# Patient Record
Sex: FEMALE | Race: WHITE | URBAN - METROPOLITAN AREA
[De-identification: names, ages, dates, MRNs, and addresses within clinical notes are randomized per-mention and may not be internally consistent; named-entity substitution may affect disease eponyms.]

---

## 2023-03-28 ENCOUNTER — OFFICE VISIT (OUTPATIENT)
Dept: GERIATRIC MEDICINE | Age: 88
End: 2023-03-28

## 2023-03-28 DIAGNOSIS — I48.91 ATRIAL FIBRILLATION, UNSPECIFIED TYPE (HCC): ICD-10-CM

## 2023-03-28 DIAGNOSIS — I50.43 ACUTE ON CHRONIC COMBINED SYSTOLIC AND DIASTOLIC CHF (CONGESTIVE HEART FAILURE) (HCC): ICD-10-CM

## 2023-03-28 DIAGNOSIS — R53.1 WEAKNESS: ICD-10-CM

## 2023-03-28 DIAGNOSIS — S52.122S CLOSED DISPLACED FRACTURE OF HEAD OF LEFT RADIUS, SEQUELA: Primary | ICD-10-CM

## 2023-03-29 ENCOUNTER — OFFICE VISIT (OUTPATIENT)
Dept: GERIATRIC MEDICINE | Age: 88
End: 2023-03-29

## 2023-03-29 DIAGNOSIS — R53.1 WEAKNESS: ICD-10-CM

## 2023-03-29 DIAGNOSIS — I50.43 ACUTE ON CHRONIC COMBINED SYSTOLIC AND DIASTOLIC CHF (CONGESTIVE HEART FAILURE) (HCC): ICD-10-CM

## 2023-03-29 DIAGNOSIS — S52.122S CLOSED DISPLACED FRACTURE OF HEAD OF LEFT RADIUS, SEQUELA: Primary | ICD-10-CM

## 2023-03-29 DIAGNOSIS — I48.91 ATRIAL FIBRILLATION, UNSPECIFIED TYPE (HCC): ICD-10-CM

## 2023-03-30 ENCOUNTER — OFFICE VISIT (OUTPATIENT)
Dept: GERIATRIC MEDICINE | Age: 88
End: 2023-03-30

## 2023-03-30 DIAGNOSIS — S52.122S CLOSED DISPLACED FRACTURE OF HEAD OF LEFT RADIUS, SEQUELA: Primary | ICD-10-CM

## 2023-03-30 DIAGNOSIS — I48.91 ATRIAL FIBRILLATION, UNSPECIFIED TYPE (HCC): ICD-10-CM

## 2023-03-30 DIAGNOSIS — I50.43 ACUTE ON CHRONIC COMBINED SYSTOLIC AND DIASTOLIC CHF (CONGESTIVE HEART FAILURE) (HCC): ICD-10-CM

## 2023-03-30 DIAGNOSIS — R53.1 WEAKNESS: ICD-10-CM

## 2023-03-31 ENCOUNTER — OFFICE VISIT (OUTPATIENT)
Dept: GERIATRIC MEDICINE | Age: 88
End: 2023-03-31

## 2023-03-31 DIAGNOSIS — R53.1 WEAKNESS: ICD-10-CM

## 2023-03-31 DIAGNOSIS — I48.91 ATRIAL FIBRILLATION, UNSPECIFIED TYPE (HCC): ICD-10-CM

## 2023-03-31 DIAGNOSIS — S52.122S CLOSED DISPLACED FRACTURE OF HEAD OF LEFT RADIUS, SEQUELA: Primary | ICD-10-CM

## 2023-03-31 DIAGNOSIS — I50.43 ACUTE ON CHRONIC COMBINED SYSTOLIC AND DIASTOLIC CHF (CONGESTIVE HEART FAILURE) (HCC): ICD-10-CM

## 2023-04-01 NOTE — PROGRESS NOTES
History and Physical      CHIEF COMPLAINT:  weakness/ Left shoulder pain. History of Present Illness:      A 80 y.o. female who is being seen at [facility]. The patient sustained a mechanical fall at home and fell on her left side breaking her radial head with a buckle fx. REVIEW OF SYSTEMS:  A complete 10 Point review of systems was preformed and negative unless previously stated      PMH:  Afib  Htn  Combined CHF     Surgical History:  No past surgical history on file. Medications Prior to Admission:    Prior to Admission medications    Not on File       Allergies:    Patient has no allergy information on record. Social History:    Denies smoking, drugs, ETOH     Family History:   Cardiac disease. PHYSICAL EXAM:      Vitals were reviewed and stable     Gen- appears stated age. HENT- bruising noted to the left eye  hearing intact, oral mucosa moist. Original dentition and fair. Eye- EOMI, No pale conjunctiva. No scleral icterus   Neck- No thyromegalgy. No JVD. Heart- RRR no murmur No LE edema  Lungs- CTA b/l no respiratory distress. 3 L NC oxygen   Abd- soft, Nontender, BS x 4   Musculoskel- muscle strength 5/5 UE bilaterally. 5/5 lower extremity bilaterally. Neuro- grossly intact. No acute deficits noted. No sensation disturbances. No facial droop   Skin- intact and dry. No rashes or ulcerations  Psych-  Mood and affect normal. Alert and oriented x 3         LABS:            ASSESSMENT/ PLAN[de-identified]      Left radial fracture   Saw ortho in hospital   Weakness   PT OT   Afib   on eliquis   HTN  Combined CHF   Monitor fluid balance. Jacinta Polanco DO, FACSUZY         NOTE: This report was transcribed using voice recognition software. Every effort was made to ensure accuracy; however, inadvertent computerized transcription errors may be present. Patient information on fall and injury prevention

## 2023-04-01 NOTE — PROGRESS NOTES
SNF PROGRESS NOTE      Cc- weakness, shoulder pain       Patient is a Elizabeth Franklin 80 y.o. female who is being seen at [facility]. She is resting in bed. Pain controlled. No past medical history on file. Patient has no allergy information on record.     VS reviewed    Gen- Alert and oriented   Heart- RRR no murmur no LE edema   Lungs- CTA b/l no resp distress RA oxygen   Abd- bs x 4           Assessment and Plan    Left radial fracture   Saw ortho in hospital   PRN oxycodone     Weakness   PT OT   Afib   on eliquis   Rate controlled   On cardizem/ metoprolol   HTN  On metoprolol   Depression   On zoloft   Combined CHF   Monitor fluid balance      Pato Alegria DO

## 2023-04-02 ENCOUNTER — OFFICE VISIT (OUTPATIENT)
Dept: GERIATRIC MEDICINE | Age: 88
End: 2023-04-02

## 2023-04-02 DIAGNOSIS — S52.122S CLOSED DISPLACED FRACTURE OF HEAD OF LEFT RADIUS, SEQUELA: Primary | ICD-10-CM

## 2023-04-02 DIAGNOSIS — I50.43 ACUTE ON CHRONIC COMBINED SYSTOLIC AND DIASTOLIC CHF (CONGESTIVE HEART FAILURE) (HCC): ICD-10-CM

## 2023-04-02 DIAGNOSIS — I48.91 ATRIAL FIBRILLATION, UNSPECIFIED TYPE (HCC): ICD-10-CM

## 2023-04-02 DIAGNOSIS — R53.1 WEAKNESS: ICD-10-CM

## 2023-04-02 NOTE — PROGRESS NOTES
SNF PROGRESS NOTE      Cc- weakness      Patient is a Sandra Manner 80 y.o. female who is being seen at [facility]. She seems to be doing well. Eating well. No problems with BM. No past medical history on file. Patient has no allergy information on record.     VS reviewed    Gen- Alert and oriented   Heart- RRR no murmur no LE edema   Lungs- CTA b/l no resp distress RA oxygen   Abd- bs x 4                   Assessment and Plan    Left radial fracture   Saw ortho in hospital   PRN oxycodone    Weakness   PT OT   Afib   on eliquis   Rate controlled   On cardizem/ metoprolol   HTN  On metoprolol   Depression   On zoloft   Combined CHF   Monitor fluid balance      Shanita Ace DO

## 2023-04-02 NOTE — PROGRESS NOTES
SNF PROGRESS NOTE      Cc- weakness      Patient is a Magdy Avila 80 y.o. female who is being seen at [facility]. She seems to be doing ok, No issues at this time and pain is controlled. No past medical history on file. Patient has no allergy information on record.     VS reviewed    Gen- Alert and oriented   Heart- RRR no murmur no LE edema   Lungs- CTA b/l no resp distress RA oxygen   Abd- bs x 4                      Assessment and Plan    Left radial fracture   Saw ortho in hospital   PRN oxycodone    Weakness   PT OT   Afib   on eliquis   Rate controlled   On cardizem/ metoprolol   HTN  On metoprolol   Depression   On zoloft   Combined CHF   Monitor fluid balance      Mari Irving DO

## 2023-04-04 ENCOUNTER — OFFICE VISIT (OUTPATIENT)
Dept: GERIATRIC MEDICINE | Age: 88
End: 2023-04-04
Payer: MEDICARE

## 2023-04-04 DIAGNOSIS — S52.122S CLOSED DISPLACED FRACTURE OF HEAD OF LEFT RADIUS, SEQUELA: Primary | ICD-10-CM

## 2023-04-04 DIAGNOSIS — R10.84 GENERALIZED ABDOMINAL PAIN: ICD-10-CM

## 2023-04-04 DIAGNOSIS — R53.1 WEAKNESS: ICD-10-CM

## 2023-04-04 DIAGNOSIS — I50.43 ACUTE ON CHRONIC COMBINED SYSTOLIC AND DIASTOLIC CHF (CONGESTIVE HEART FAILURE) (HCC): ICD-10-CM

## 2023-04-04 DIAGNOSIS — I48.91 ATRIAL FIBRILLATION, UNSPECIFIED TYPE (HCC): ICD-10-CM

## 2023-04-04 PROCEDURE — 1123F ACP DISCUSS/DSCN MKR DOCD: CPT | Performed by: INTERNAL MEDICINE

## 2023-04-04 PROCEDURE — 99309 SBSQ NF CARE MODERATE MDM 30: CPT | Performed by: INTERNAL MEDICINE

## 2023-04-05 NOTE — PROGRESS NOTES
SNF PROGRESS NOTE      Cc- weakness      Patient is a Ileene Bathe 80 y.o. female who is being seen at [facility]. She is walking around the room with her walker. Her daughter is at bedside. She is complaining of some abd pain. We went over her KUB results. She states that it's like her diverticulitis pain. No fever or chills. No bloody stools. Generalized abd pain. No past medical history on file. Patient has no allergy information on record. VS reviewed    Gen- Alert and oriented   Heart- RRR no murmur no LE edema   Lungs- CTA b/l no resp distress RA oxygen   Abd- bs x 4         Assessment and Plan    Left radial fracture   Saw ortho in hospital   PRN oxycodone    Weakness   PT OT   Afib   on eliquis   Rate controlled   On cardizem/ metoprolol   HTN  On metoprolol   Depression   On zoloft   Combined CHF   Watch fluid balance   Abdominal Pain   KUB reviewed and negative   Started on cipro and flagyl for ? Diverticulitis.        Madeleine Gallardo

## 2023-04-06 ENCOUNTER — OFFICE VISIT (OUTPATIENT)
Dept: GERIATRIC MEDICINE | Age: 88
End: 2023-04-06

## 2023-04-06 DIAGNOSIS — I50.43 ACUTE ON CHRONIC COMBINED SYSTOLIC AND DIASTOLIC CHF (CONGESTIVE HEART FAILURE) (HCC): ICD-10-CM

## 2023-04-06 DIAGNOSIS — S52.122S CLOSED DISPLACED FRACTURE OF HEAD OF LEFT RADIUS, SEQUELA: Primary | ICD-10-CM

## 2023-04-06 DIAGNOSIS — R53.1 WEAKNESS: ICD-10-CM

## 2023-04-06 DIAGNOSIS — I48.91 ATRIAL FIBRILLATION, UNSPECIFIED TYPE (HCC): ICD-10-CM

## 2023-04-06 DIAGNOSIS — R10.84 GENERALIZED ABDOMINAL PAIN: ICD-10-CM

## 2023-04-08 ENCOUNTER — OFFICE VISIT (OUTPATIENT)
Dept: GERIATRIC MEDICINE | Age: 88
End: 2023-04-08

## 2023-04-08 DIAGNOSIS — R53.1 WEAKNESS: ICD-10-CM

## 2023-04-08 DIAGNOSIS — S52.122S CLOSED DISPLACED FRACTURE OF HEAD OF LEFT RADIUS, SEQUELA: Primary | ICD-10-CM

## 2023-04-08 DIAGNOSIS — R10.84 GENERALIZED ABDOMINAL PAIN: ICD-10-CM

## 2023-04-08 DIAGNOSIS — I48.91 ATRIAL FIBRILLATION, UNSPECIFIED TYPE (HCC): ICD-10-CM

## 2023-04-08 DIAGNOSIS — I50.43 ACUTE ON CHRONIC COMBINED SYSTOLIC AND DIASTOLIC CHF (CONGESTIVE HEART FAILURE) (HCC): ICD-10-CM

## 2023-04-09 NOTE — PROGRESS NOTES
SNF PROGRESS NOTE      Cc- weakness      Patient is a Oziel Jones 80 y.o. female who is being seen at [facility]. The patient is laying in bed. She feels good and has been doing well with therapy. No past medical history on file. Patient has no allergy information on record. VS reviewed      Gen- Alert and oriented   Heart- RRR no murmur no LE edema   Lungs- CTA b/l no resp distress RA oxygen   Abd- bs x 4             There is no problem list on file for this patient. Assessment and Plan    Left radial fracture   Saw ortho in hospital   PRN oxycodone    Weakness   PT OT   Afib   on eliquis   Rate controlled   On cardizem/ metoprolol   HTN  On metoprolol   Depression   On zoloft   Combined CHF   Watch fluid balance   Abdominal Pain   KUB reviewed and negative   Started on cipro and flagyl for ? Diverticulitis.        Helen Ordoñez

## 2023-04-09 NOTE — PROGRESS NOTES
SNF PROGRESS NOTE      Cc- weakness      Patient is a Aletha Leyden 80 y.o. female who is being seen at [facility]. She is sitting in the chair at bedside. No complaints and pain is controlled. No past medical history on file. Patient has no allergy information on record. VS reviewed      Gen- Alert and oriented   Heart- RRR no murmur no LE edema   Lungs- CTA b/l no resp distress RA oxygen   Abd- bs x 4         Assessment and Plan    Left radial fracture   Saw ortho in hospital   PRN oxycodone    Weakness   PT OT   Afib   on eliquis   Rate controlled   On cardizem/ metoprolol   HTN  On metoprolol   Depression   On zoloft   Combined CHF   Watch fluid balance   Abdominal Pain   KUB reviewed and negative   Started on cipro and flagyl for ? Diverticulitis.        Veronika Egan

## 2023-04-15 ENCOUNTER — OFFICE VISIT (OUTPATIENT)
Dept: GERIATRIC MEDICINE | Age: 88
End: 2023-04-15
Payer: MEDICARE

## 2023-04-15 DIAGNOSIS — I48.91 ATRIAL FIBRILLATION, UNSPECIFIED TYPE (HCC): ICD-10-CM

## 2023-04-15 DIAGNOSIS — I50.43 ACUTE ON CHRONIC COMBINED SYSTOLIC AND DIASTOLIC CHF (CONGESTIVE HEART FAILURE) (HCC): ICD-10-CM

## 2023-04-15 DIAGNOSIS — R10.84 GENERALIZED ABDOMINAL PAIN: ICD-10-CM

## 2023-04-15 DIAGNOSIS — S52.122S CLOSED DISPLACED FRACTURE OF HEAD OF LEFT RADIUS, SEQUELA: Primary | ICD-10-CM

## 2023-04-15 DIAGNOSIS — R53.1 WEAKNESS: ICD-10-CM

## 2023-04-15 PROCEDURE — 99308 SBSQ NF CARE LOW MDM 20: CPT | Performed by: INTERNAL MEDICINE

## 2023-04-15 PROCEDURE — 1123F ACP DISCUSS/DSCN MKR DOCD: CPT | Performed by: INTERNAL MEDICINE

## 2023-04-16 ENCOUNTER — OFFICE VISIT (OUTPATIENT)
Dept: GERIATRIC MEDICINE | Age: 88
End: 2023-04-16

## 2023-04-16 DIAGNOSIS — I50.43 ACUTE ON CHRONIC COMBINED SYSTOLIC AND DIASTOLIC CHF (CONGESTIVE HEART FAILURE) (HCC): ICD-10-CM

## 2023-04-16 DIAGNOSIS — R53.1 WEAKNESS: ICD-10-CM

## 2023-04-16 DIAGNOSIS — R10.84 GENERALIZED ABDOMINAL PAIN: ICD-10-CM

## 2023-04-16 DIAGNOSIS — I48.91 ATRIAL FIBRILLATION, UNSPECIFIED TYPE (HCC): ICD-10-CM

## 2023-04-16 DIAGNOSIS — S52.122S CLOSED DISPLACED FRACTURE OF HEAD OF LEFT RADIUS, SEQUELA: Primary | ICD-10-CM

## 2023-04-17 ENCOUNTER — OFFICE VISIT (OUTPATIENT)
Dept: GERIATRIC MEDICINE | Age: 88
End: 2023-04-17

## 2023-04-17 DIAGNOSIS — R53.1 WEAKNESS: ICD-10-CM

## 2023-04-17 DIAGNOSIS — R10.84 GENERALIZED ABDOMINAL PAIN: ICD-10-CM

## 2023-04-17 DIAGNOSIS — I50.43 ACUTE ON CHRONIC COMBINED SYSTOLIC AND DIASTOLIC CHF (CONGESTIVE HEART FAILURE) (HCC): ICD-10-CM

## 2023-04-17 DIAGNOSIS — S52.122S CLOSED DISPLACED FRACTURE OF HEAD OF LEFT RADIUS, SEQUELA: Primary | ICD-10-CM

## 2023-04-17 DIAGNOSIS — I48.91 ATRIAL FIBRILLATION, UNSPECIFIED TYPE (HCC): ICD-10-CM

## 2023-04-19 NOTE — PROGRESS NOTES
SNF PROGRESS NOTE      Cc- weakness      Patient is a Valeta Blood 80 y.o. female who is being seen at [facility]. Apparently she occasionally not wanting to partake in therapy. She states that she has a problem because people are living in her house. No past medical history on file. Patient has no allergy information on record.     VS reviewed      Gen- Alert and oriented   Heart- RRR no murmur no LE edema   Lungs- CTA b/l no resp distress RA oxygen   Abd- bs x 4           Assessment and Plan  Left radial fracture   Saw ortho in hospital   PRN oxycodone    Weakness   PT OT   Afib   on eliquis   Rate controlled   On cardizem/ metoprolol   HTN  On metoprolol   Depression   On zoloft   Combined CHF   Watch fluid balance   Abdominal Pain   resolved        Patrice Prieto DO

## 2023-04-20 ENCOUNTER — OFFICE VISIT (OUTPATIENT)
Dept: GERIATRIC MEDICINE | Age: 88
End: 2023-04-20

## 2023-04-20 DIAGNOSIS — R10.84 GENERALIZED ABDOMINAL PAIN: ICD-10-CM

## 2023-04-20 DIAGNOSIS — R53.1 WEAKNESS: ICD-10-CM

## 2023-04-20 DIAGNOSIS — S52.122S CLOSED DISPLACED FRACTURE OF HEAD OF LEFT RADIUS, SEQUELA: Primary | ICD-10-CM

## 2023-04-20 DIAGNOSIS — I50.43 ACUTE ON CHRONIC COMBINED SYSTOLIC AND DIASTOLIC CHF (CONGESTIVE HEART FAILURE) (HCC): ICD-10-CM

## 2023-04-20 DIAGNOSIS — I48.91 ATRIAL FIBRILLATION, UNSPECIFIED TYPE (HCC): ICD-10-CM

## 2023-04-20 NOTE — PROGRESS NOTES
SNF PROGRESS NOTE      Cc- weakness      Patient is a Isaiah Radar 80 y.o. female who is being seen at [facility]. She is laying in the bed and eating lunch. No past medical history on file. Patient has no allergy information on record.     VS reviewed    Gen- Alert and oriented   Heart- RRR no murmur no LE edema   Lungs- CTA b/l no resp distress RA oxygen   Abd- bs x 4         Assessment and Plan  Left radial fracture   Saw ortho in hospital   PRN oxycodone    Weakness   PT OT   Afib   on eliquis   Rate controlled   On cardizem/ metoprolol   HTN  On metoprolol   Depression   On zoloft   Combined CHF   Watch fluid balance   Abdominal Pain   resolved        Enio Camarena DO

## 2023-04-21 NOTE — PROGRESS NOTES
SNF PROGRESS NOTE      Cc- weakness      Patient is a Nazario Butt 80 y.o. female who Is being seen at [facility]. She is sitting is laying in the bed. She just got done with therapy. She ate a good lunch. No past medical history on file. Patient has no allergy information on record. VS reviewed    Gen- Alert and oriented x2   Heart- RRR no murmur no LE edema   Lungs- CTA b/l no resp distress RA oxygen   Abd- bs x 4        There is no problem list on file for this patient.           Assessment and Plan  Left radial fracture   Saw ortho in hospital-- f/u OP  PRN oxycodone    Weakness   PT OT   Afib   on eliquis   Rate controlled   On cardizem/ metoprolol   HTN  On metoprolol   Depression   On zoloft   Combined CHF   Watch fluid balance   Abdominal Pain   resolved        Thedora Newcomer DO, Sheri Galeazzi

## 2023-04-21 NOTE — PROGRESS NOTES
SNF PROGRESS NOTE      Cc- weakness      Patient is a Ryan Restrepo 80 y.o. female who is being seen at [facility]. She is sitting in the chair. She denies any complaints. No past medical history on file. Patient has no allergy information on record. VS reviewed      Gen- Alert and oriented x2   Heart- RRR no murmur no LE edema   Lungs- CTA b/l no resp distress RA oxygen   Abd- bs x 4               There is no problem list on file for this patient.           Assessment and Plan    Left radial fracture   Saw ortho in hospital   PRN oxycodone    Weakness   PT OT   Afib   on eliquis   Rate controlled   On cardizem/ metoprolol   HTN  On metoprolol   Depression   On zoloft   Combined CHF   Watch fluid balance   Abdominal Pain   resolved      Brady Adjutant Paulino RENO

## 2023-04-24 ENCOUNTER — OFFICE VISIT (OUTPATIENT)
Dept: GERIATRIC MEDICINE | Age: 88
End: 2023-04-24

## 2023-04-24 DIAGNOSIS — S52.122S CLOSED DISPLACED FRACTURE OF HEAD OF LEFT RADIUS, SEQUELA: Primary | ICD-10-CM

## 2023-04-24 DIAGNOSIS — I50.43 ACUTE ON CHRONIC COMBINED SYSTOLIC AND DIASTOLIC CHF (CONGESTIVE HEART FAILURE) (HCC): ICD-10-CM

## 2023-04-24 DIAGNOSIS — R53.1 WEAKNESS: ICD-10-CM

## 2023-04-24 DIAGNOSIS — R10.84 GENERALIZED ABDOMINAL PAIN: ICD-10-CM

## 2023-04-24 DIAGNOSIS — I48.91 ATRIAL FIBRILLATION, UNSPECIFIED TYPE (HCC): ICD-10-CM

## 2023-04-25 ENCOUNTER — OFFICE VISIT (OUTPATIENT)
Dept: GERIATRIC MEDICINE | Age: 88
End: 2023-04-25

## 2023-04-25 DIAGNOSIS — R10.84 GENERALIZED ABDOMINAL PAIN: ICD-10-CM

## 2023-04-25 DIAGNOSIS — R53.1 WEAKNESS: ICD-10-CM

## 2023-04-25 DIAGNOSIS — S52.122S CLOSED DISPLACED FRACTURE OF HEAD OF LEFT RADIUS, SEQUELA: Primary | ICD-10-CM

## 2023-04-25 DIAGNOSIS — I48.91 ATRIAL FIBRILLATION, UNSPECIFIED TYPE (HCC): ICD-10-CM

## 2023-04-25 DIAGNOSIS — I50.43 ACUTE ON CHRONIC COMBINED SYSTOLIC AND DIASTOLIC CHF (CONGESTIVE HEART FAILURE) (HCC): ICD-10-CM

## 2023-04-26 ENCOUNTER — OFFICE VISIT (OUTPATIENT)
Dept: GERIATRIC MEDICINE | Age: 88
End: 2023-04-26

## 2023-04-26 DIAGNOSIS — I48.91 ATRIAL FIBRILLATION, UNSPECIFIED TYPE (HCC): ICD-10-CM

## 2023-04-26 DIAGNOSIS — S52.122S CLOSED DISPLACED FRACTURE OF HEAD OF LEFT RADIUS, SEQUELA: Primary | ICD-10-CM

## 2023-04-26 DIAGNOSIS — I50.43 ACUTE ON CHRONIC COMBINED SYSTOLIC AND DIASTOLIC CHF (CONGESTIVE HEART FAILURE) (HCC): ICD-10-CM

## 2023-04-26 DIAGNOSIS — R53.1 WEAKNESS: ICD-10-CM

## 2023-04-26 DIAGNOSIS — R10.84 GENERALIZED ABDOMINAL PAIN: ICD-10-CM

## 2023-04-27 ENCOUNTER — OFFICE VISIT (OUTPATIENT)
Dept: GERIATRIC MEDICINE | Age: 88
End: 2023-04-27
Payer: MEDICARE

## 2023-04-27 DIAGNOSIS — S52.122S CLOSED DISPLACED FRACTURE OF HEAD OF LEFT RADIUS, SEQUELA: Primary | ICD-10-CM

## 2023-04-27 DIAGNOSIS — R53.1 WEAKNESS: ICD-10-CM

## 2023-04-27 DIAGNOSIS — R10.84 GENERALIZED ABDOMINAL PAIN: ICD-10-CM

## 2023-04-27 DIAGNOSIS — I48.91 ATRIAL FIBRILLATION, UNSPECIFIED TYPE (HCC): ICD-10-CM

## 2023-04-27 DIAGNOSIS — I50.43 ACUTE ON CHRONIC COMBINED SYSTOLIC AND DIASTOLIC CHF (CONGESTIVE HEART FAILURE) (HCC): ICD-10-CM

## 2023-04-27 PROCEDURE — 99308 SBSQ NF CARE LOW MDM 20: CPT | Performed by: INTERNAL MEDICINE

## 2023-04-27 PROCEDURE — 1123F ACP DISCUSS/DSCN MKR DOCD: CPT | Performed by: INTERNAL MEDICINE

## 2023-04-28 ENCOUNTER — OFFICE VISIT (OUTPATIENT)
Dept: GERIATRIC MEDICINE | Age: 88
End: 2023-04-28
Payer: MEDICARE

## 2023-04-28 DIAGNOSIS — I48.91 ATRIAL FIBRILLATION, UNSPECIFIED TYPE (HCC): ICD-10-CM

## 2023-04-28 DIAGNOSIS — S52.122S CLOSED DISPLACED FRACTURE OF HEAD OF LEFT RADIUS, SEQUELA: Primary | ICD-10-CM

## 2023-04-28 DIAGNOSIS — R10.84 GENERALIZED ABDOMINAL PAIN: ICD-10-CM

## 2023-04-28 DIAGNOSIS — R53.1 WEAKNESS: ICD-10-CM

## 2023-04-28 DIAGNOSIS — I50.43 ACUTE ON CHRONIC COMBINED SYSTOLIC AND DIASTOLIC CHF (CONGESTIVE HEART FAILURE) (HCC): ICD-10-CM

## 2023-04-28 PROCEDURE — 1123F ACP DISCUSS/DSCN MKR DOCD: CPT | Performed by: INTERNAL MEDICINE

## 2023-04-28 PROCEDURE — 99308 SBSQ NF CARE LOW MDM 20: CPT | Performed by: INTERNAL MEDICINE

## 2023-04-29 NOTE — PROGRESS NOTES
SNF PROGRESS NOTE      Cc- weakness      Patient is a Shreya Martinez 80 y.o. female who is being seen at [facility]. She is laying in her bed. Her bruise around the eye continues to improve. She is continuing to work with therapy. No past medical history on file. Patient has no allergy information on record. VS reviewed      Gen- Alert and oriented x2   Heart- RRR no murmur no LE edema   Lungs- CTA b/l no resp distress RA oxygen   Abd- bs x 4            There is no problem list on file for this patient.           Assessment and Plan    Left radial fracture   Saw ortho in hospital-- f/u OP  PRN oxycodone    Weakness   PT OT   Afib   on eliquis   Rate controlled   On cardizem/ metoprolol   HTN  On metoprolol   Depression   On zoloft   Combined CHF   Watch fluid balance   Abdominal Pain   resolved      Goran Olsen DO, Pankaj Slaughter

## 2023-04-30 ENCOUNTER — OFFICE VISIT (OUTPATIENT)
Dept: GERIATRIC MEDICINE | Age: 88
End: 2023-04-30
Payer: MEDICARE

## 2023-04-30 DIAGNOSIS — R10.84 GENERALIZED ABDOMINAL PAIN: ICD-10-CM

## 2023-04-30 DIAGNOSIS — I50.43 ACUTE ON CHRONIC COMBINED SYSTOLIC AND DIASTOLIC CHF (CONGESTIVE HEART FAILURE) (HCC): ICD-10-CM

## 2023-04-30 DIAGNOSIS — I48.91 ATRIAL FIBRILLATION, UNSPECIFIED TYPE (HCC): ICD-10-CM

## 2023-04-30 DIAGNOSIS — R53.1 WEAKNESS: ICD-10-CM

## 2023-04-30 DIAGNOSIS — S52.122S CLOSED DISPLACED FRACTURE OF HEAD OF LEFT RADIUS, SEQUELA: Primary | ICD-10-CM

## 2023-04-30 PROCEDURE — 99308 SBSQ NF CARE LOW MDM 20: CPT | Performed by: INTERNAL MEDICINE

## 2023-04-30 PROCEDURE — 1123F ACP DISCUSS/DSCN MKR DOCD: CPT | Performed by: INTERNAL MEDICINE

## 2023-05-01 ENCOUNTER — OFFICE VISIT (OUTPATIENT)
Dept: GERIATRIC MEDICINE | Age: 88
End: 2023-05-01
Payer: MEDICARE

## 2023-05-01 DIAGNOSIS — R10.84 GENERALIZED ABDOMINAL PAIN: ICD-10-CM

## 2023-05-01 DIAGNOSIS — I48.91 ATRIAL FIBRILLATION, UNSPECIFIED TYPE (HCC): ICD-10-CM

## 2023-05-01 DIAGNOSIS — I50.43 ACUTE ON CHRONIC COMBINED SYSTOLIC AND DIASTOLIC CHF (CONGESTIVE HEART FAILURE) (HCC): ICD-10-CM

## 2023-05-01 DIAGNOSIS — S52.122S CLOSED DISPLACED FRACTURE OF HEAD OF LEFT RADIUS, SEQUELA: Primary | ICD-10-CM

## 2023-05-01 DIAGNOSIS — R53.1 WEAKNESS: ICD-10-CM

## 2023-05-01 PROCEDURE — 99308 SBSQ NF CARE LOW MDM 20: CPT | Performed by: INTERNAL MEDICINE

## 2023-05-01 PROCEDURE — 1123F ACP DISCUSS/DSCN MKR DOCD: CPT | Performed by: INTERNAL MEDICINE

## 2023-05-02 NOTE — PROGRESS NOTES
SNF PROGRESS NOTE      Cc- weakness      Patient is a Raoul Augustin 80 y.o. female who is being seen at [facility]. She is sitting in the chair and doing ok. No past medical history on file. Patient has no allergy information on record. VS reviewed    Gen- Alert and oriented x2   Heart- RRR no murmur no LE edema   Lungs- CTA b/l no resp distress RA oxygen   Abd- bs x 4            There is no problem list on file for this patient.           Assessment and Plan    Left radial fracture   Saw ortho in hospital-- f/u OP  PRN oxycodone    Weakness   PT OT   Afib   on eliquis   Rate controlled   On cardizem/ metoprolol   HTN  On metoprolol   Depression   On zoloft   Combined CHF   Watch fluid balance   Abdominal Pain   resolved      Kika Esqueda DO, Fredy Mg

## 2023-05-02 NOTE — PROGRESS NOTES
SNF PROGRESS NOTE      Cc- weakness      Patient is a Vera Repress 80 y.o. female who is being see at [facility]. She is sitting in the chair watching TV. She denies any complaints at this time. She states that therapy is \"going\". No past medical history on file. Patient has no allergy information on record. VS reviewed    Gen- Alert and oriented x2   Heart- RRR no murmur no LE edema   Lungs- CTA b/l no resp distress RA oxygen   Abd- bs x 4            There is no problem list on file for this patient.           Assessment and Plan    Left radial fracture   Saw ortho in hospital-- f/u OP  PRN oxycodone    Weakness   PT OT   Afib   on eliquis   Rate controlled   On cardizem/ metoprolol   HTN  On metoprolol   Depression   On zoloft   Combined CHF   Watch fluid balance   Abdominal Pain   resolved      Dede Putt DO, Marlys Closs

## 2023-05-02 NOTE — PROGRESS NOTES
SNF PROGRESS NOTE      Cc- weakness      Patient is a Blanca Levels 80 y.o. female who is being seen at [facility]. She is laying in her bed. She states pain controlled and eating ok. No past medical history on file. Patient has no allergy information on record. VS reviewed      Gen- Alert and oriented x2   Heart- RRR no murmur no LE edema   Lungs- CTA b/l no resp distress RA oxygen   Abd- bs x 4          There is no problem list on file for this patient.           Assessment and Plan    Left radial fracture   Saw ortho in hospital-- f/u OP  PRN oxycodone    Weakness   PT OT   Afib   on eliquis   Rate controlled   On cardizem/ metoprolol   HTN  On metoprolol   Depression   On zoloft   Combined CHF   Watch fluid balance   Abdominal Pain   resolved      Dolores Haji DO, Gaetano Aguirre

## 2023-05-03 ENCOUNTER — OFFICE VISIT (OUTPATIENT)
Dept: GERIATRIC MEDICINE | Age: 88
End: 2023-05-03

## 2023-05-03 DIAGNOSIS — R53.1 WEAKNESS: ICD-10-CM

## 2023-05-03 DIAGNOSIS — I50.43 ACUTE ON CHRONIC COMBINED SYSTOLIC AND DIASTOLIC CHF (CONGESTIVE HEART FAILURE) (HCC): ICD-10-CM

## 2023-05-03 DIAGNOSIS — I48.91 ATRIAL FIBRILLATION, UNSPECIFIED TYPE (HCC): ICD-10-CM

## 2023-05-03 DIAGNOSIS — R10.84 GENERALIZED ABDOMINAL PAIN: ICD-10-CM

## 2023-05-03 DIAGNOSIS — S52.122S CLOSED DISPLACED FRACTURE OF HEAD OF LEFT RADIUS, SEQUELA: Primary | ICD-10-CM

## 2023-05-05 ENCOUNTER — OFFICE VISIT (OUTPATIENT)
Dept: GERIATRIC MEDICINE | Age: 88
End: 2023-05-05

## 2023-05-05 DIAGNOSIS — I48.91 ATRIAL FIBRILLATION, UNSPECIFIED TYPE (HCC): ICD-10-CM

## 2023-05-05 DIAGNOSIS — R10.84 GENERALIZED ABDOMINAL PAIN: ICD-10-CM

## 2023-05-05 DIAGNOSIS — I50.43 ACUTE ON CHRONIC COMBINED SYSTOLIC AND DIASTOLIC CHF (CONGESTIVE HEART FAILURE) (HCC): ICD-10-CM

## 2023-05-05 DIAGNOSIS — R53.1 WEAKNESS: ICD-10-CM

## 2023-05-05 DIAGNOSIS — S52.122S CLOSED DISPLACED FRACTURE OF HEAD OF LEFT RADIUS, SEQUELA: Primary | ICD-10-CM

## 2023-05-05 NOTE — PROGRESS NOTES
SNF PROGRESS NOTE      Cc- weakness      Patient is a Jeffery Jackson 80 y.o. female who is being seen at [facility]. She is laying in bed. No complaints. No SOB. No CP. Eating well. No past medical history on file. Patient has no allergy information on record.     VS reviewed    Gen- Alert and oriented x2   Heart- RRR no murmur no LE edema   Lungs- CTA b/l no resp distress RA oxygen   Abd- bs x 4        Assessment and Plan  Left radial fracture   Saw ortho in hospital-- f/u OP  PRN oxycodone    Weakness   PT OT   Afib   on eliquis   Rate controlled   On cardizem/ metoprolol   HTN  On metoprolol   Depression   On zoloft   Combined CHF   Watch fluid balance   Abdominal Pain   resolved        Laxmi Chou DO

## 2023-05-06 NOTE — PROGRESS NOTES
SNF PROGRESS NOTE      Cc- weakness      Patient is a Domitila Amaro 80 y.o. female who is being seen at [facility]. She is laying in bed. States that she is doing ok. No complaints at this time. Eating well. No past medical history on file. Patient has no allergy information on record.     VS reviewed    Gen- Alert and oriented x2   Heart- RRR no murmur no LE edema   Lungs- CTA b/l no resp distress RA oxygen   Abd- bs x 4            Assessment and Plan    Left radial fracture   Saw ortho in hospital-- f/u OP  PRN oxycodone    Weakness   PT OT   Afib   on eliquis   Rate controlled   On cardizem/ metoprolol   HTN  On metoprolol   Depression   On zoloft   Combined CHF   Watch fluid balance   Abdominal Pain   resolved      Melo Zhong DO, Marilyn Arts

## 2023-05-08 ENCOUNTER — OFFICE VISIT (OUTPATIENT)
Dept: GERIATRIC MEDICINE | Age: 88
End: 2023-05-08
Payer: MEDICARE

## 2023-05-08 DIAGNOSIS — I48.91 ATRIAL FIBRILLATION, UNSPECIFIED TYPE (HCC): ICD-10-CM

## 2023-05-08 DIAGNOSIS — I50.43 ACUTE ON CHRONIC COMBINED SYSTOLIC AND DIASTOLIC CHF (CONGESTIVE HEART FAILURE) (HCC): ICD-10-CM

## 2023-05-08 DIAGNOSIS — R53.1 WEAKNESS: ICD-10-CM

## 2023-05-08 DIAGNOSIS — S52.122S CLOSED DISPLACED FRACTURE OF HEAD OF LEFT RADIUS, SEQUELA: Primary | ICD-10-CM

## 2023-05-08 PROCEDURE — 99308 SBSQ NF CARE LOW MDM 20: CPT | Performed by: INTERNAL MEDICINE

## 2023-05-08 PROCEDURE — 1123F ACP DISCUSS/DSCN MKR DOCD: CPT | Performed by: INTERNAL MEDICINE

## 2023-05-08 NOTE — PROGRESS NOTES
SNF PROGRESS NOTE      Cc- weakness      Patient is a Adri Rubio 80 y.o. female who is being seen at [facility]. She is laying in bed and continues to work with therapy. No past medical history on file. Patient has no allergy information on record. VS reviewed     Gen- Alert and oriented x2   Heart- RRR no murmur no LE edema   Lungs- CTA b/l no resp distress RA oxygen   Abd- bs x 4              There is no problem list on file for this patient.           Assessment and Plan    Left radial fracture   Saw ortho in hospital-- f/u OP  PRN oxycodone    Weakness   PT OT   Afib   on eliquis   Rate controlled   On cardizem/ metoprolol   HTN  On metoprolol   Depression   On zoloft   Combined CHF   Watch fluid balance   Abdominal Pain   resolved      Leti Adair DO

## 2023-05-09 ENCOUNTER — OFFICE VISIT (OUTPATIENT)
Dept: GERIATRIC MEDICINE | Age: 88
End: 2023-05-09

## 2023-05-09 DIAGNOSIS — I48.91 ATRIAL FIBRILLATION, UNSPECIFIED TYPE (HCC): ICD-10-CM

## 2023-05-09 DIAGNOSIS — S52.122S CLOSED DISPLACED FRACTURE OF HEAD OF LEFT RADIUS, SEQUELA: Primary | ICD-10-CM

## 2023-05-09 DIAGNOSIS — R53.1 WEAKNESS: ICD-10-CM

## 2023-05-09 DIAGNOSIS — R10.84 GENERALIZED ABDOMINAL PAIN: ICD-10-CM

## 2023-05-09 DIAGNOSIS — I50.43 ACUTE ON CHRONIC COMBINED SYSTOLIC AND DIASTOLIC CHF (CONGESTIVE HEART FAILURE) (HCC): ICD-10-CM

## 2023-05-10 ENCOUNTER — OFFICE VISIT (OUTPATIENT)
Dept: GERIATRIC MEDICINE | Age: 88
End: 2023-05-10
Payer: MEDICARE

## 2023-05-10 DIAGNOSIS — R10.84 GENERALIZED ABDOMINAL PAIN: ICD-10-CM

## 2023-05-10 DIAGNOSIS — I48.91 ATRIAL FIBRILLATION, UNSPECIFIED TYPE (HCC): ICD-10-CM

## 2023-05-10 DIAGNOSIS — I50.43 ACUTE ON CHRONIC COMBINED SYSTOLIC AND DIASTOLIC CHF (CONGESTIVE HEART FAILURE) (HCC): ICD-10-CM

## 2023-05-10 DIAGNOSIS — R53.1 WEAKNESS: ICD-10-CM

## 2023-05-10 DIAGNOSIS — S52.122S CLOSED DISPLACED FRACTURE OF HEAD OF LEFT RADIUS, SEQUELA: Primary | ICD-10-CM

## 2023-05-10 PROCEDURE — 99308 SBSQ NF CARE LOW MDM 20: CPT | Performed by: INTERNAL MEDICINE

## 2023-05-10 PROCEDURE — 1123F ACP DISCUSS/DSCN MKR DOCD: CPT | Performed by: INTERNAL MEDICINE

## 2023-05-11 ENCOUNTER — OFFICE VISIT (OUTPATIENT)
Dept: GERIATRIC MEDICINE | Age: 88
End: 2023-05-11
Payer: MEDICARE

## 2023-05-11 DIAGNOSIS — I48.91 ATRIAL FIBRILLATION, UNSPECIFIED TYPE (HCC): ICD-10-CM

## 2023-05-11 DIAGNOSIS — I50.43 ACUTE ON CHRONIC COMBINED SYSTOLIC AND DIASTOLIC CHF (CONGESTIVE HEART FAILURE) (HCC): ICD-10-CM

## 2023-05-11 DIAGNOSIS — R10.84 GENERALIZED ABDOMINAL PAIN: ICD-10-CM

## 2023-05-11 DIAGNOSIS — S52.122S CLOSED DISPLACED FRACTURE OF HEAD OF LEFT RADIUS, SEQUELA: Primary | ICD-10-CM

## 2023-05-11 DIAGNOSIS — R53.1 WEAKNESS: ICD-10-CM

## 2023-05-11 PROCEDURE — 1123F ACP DISCUSS/DSCN MKR DOCD: CPT | Performed by: INTERNAL MEDICINE

## 2023-05-11 PROCEDURE — 99308 SBSQ NF CARE LOW MDM 20: CPT | Performed by: INTERNAL MEDICINE

## 2023-05-11 NOTE — PROGRESS NOTES
SNF PROGRESS NOTE      Cc- weakness      Patient is a Ryan Restrepo 80 y.o. female who is being seen at [facility]. She is laying in bed. Denies any complaints at this time. No past medical history on file. Patient has no allergy information on record. VS reviewed    Gen- Alert and oriented x2   Heart- RRR no murmur no LE edema   Lungs- CTA b/l no resp distress RA oxygen   Abd- bs x 4               There is no problem list on file for this patient.           Assessment and Plan    Left radial fracture   Saw ortho in hospital-- f/u OP  PRN oxycodone    Weakness   PT OT   Afib   on eliquis   Rate controlled   On cardizem/ metoprolol   HTN  On metoprolol   Depression   On zoloft   Combined CHF   Watch fluid balance   Abdominal Pain   resolved      Brady Adjutant Paulino RENO

## 2023-05-12 ENCOUNTER — OFFICE VISIT (OUTPATIENT)
Dept: GERIATRIC MEDICINE | Age: 88
End: 2023-05-12

## 2023-05-12 DIAGNOSIS — R10.84 GENERALIZED ABDOMINAL PAIN: ICD-10-CM

## 2023-05-12 DIAGNOSIS — I50.43 ACUTE ON CHRONIC COMBINED SYSTOLIC AND DIASTOLIC CHF (CONGESTIVE HEART FAILURE) (HCC): ICD-10-CM

## 2023-05-12 DIAGNOSIS — S52.122S CLOSED DISPLACED FRACTURE OF HEAD OF LEFT RADIUS, SEQUELA: Primary | ICD-10-CM

## 2023-05-12 DIAGNOSIS — I48.91 ATRIAL FIBRILLATION, UNSPECIFIED TYPE (HCC): ICD-10-CM

## 2023-05-12 DIAGNOSIS — R53.1 WEAKNESS: ICD-10-CM

## 2023-05-13 ENCOUNTER — OFFICE VISIT (OUTPATIENT)
Dept: GERIATRIC MEDICINE | Age: 88
End: 2023-05-13

## 2023-05-13 DIAGNOSIS — R10.84 GENERALIZED ABDOMINAL PAIN: ICD-10-CM

## 2023-05-13 DIAGNOSIS — I50.43 ACUTE ON CHRONIC COMBINED SYSTOLIC AND DIASTOLIC CHF (CONGESTIVE HEART FAILURE) (HCC): ICD-10-CM

## 2023-05-13 DIAGNOSIS — I48.91 ATRIAL FIBRILLATION, UNSPECIFIED TYPE (HCC): ICD-10-CM

## 2023-05-13 DIAGNOSIS — R53.1 WEAKNESS: ICD-10-CM

## 2023-05-13 DIAGNOSIS — S52.122S CLOSED DISPLACED FRACTURE OF HEAD OF LEFT RADIUS, SEQUELA: Primary | ICD-10-CM

## 2023-05-15 ENCOUNTER — OFFICE VISIT (OUTPATIENT)
Dept: GERIATRIC MEDICINE | Age: 88
End: 2023-05-15

## 2023-05-15 DIAGNOSIS — R53.1 WEAKNESS: ICD-10-CM

## 2023-05-15 DIAGNOSIS — I50.43 ACUTE ON CHRONIC COMBINED SYSTOLIC AND DIASTOLIC CHF (CONGESTIVE HEART FAILURE) (HCC): ICD-10-CM

## 2023-05-15 DIAGNOSIS — S52.122S CLOSED DISPLACED FRACTURE OF HEAD OF LEFT RADIUS, SEQUELA: Primary | ICD-10-CM

## 2023-05-15 DIAGNOSIS — R10.84 GENERALIZED ABDOMINAL PAIN: ICD-10-CM

## 2023-05-15 DIAGNOSIS — I48.91 ATRIAL FIBRILLATION, UNSPECIFIED TYPE (HCC): ICD-10-CM

## 2023-05-16 NOTE — PROGRESS NOTES
SNF PROGRESS NOTE      Cc- weakness      Patient is a Mauricio Santiago 80 y.o. female who is being seen at [facility]. She is sitting in the chair at bedside. No past medical history on file. Patient has no allergy information on record.     VS reviewed    Gen- Alert and oriented x2   Heart- RRR no murmur no LE edema   Lungs- CTA b/l no resp distress RA oxygen   Abd- bs x 4        Assessment and Plan    Left radial fracture   Saw ortho in hospital-- f/u OP  PRN oxycodone    Weakness   PT OT   Afib   on eliquis   Rate controlled   On cardizem/ metoprolol   HTN  On metoprolol   Depression   On zoloft   Combined CHF   Watch fluid balance   Abdominal Pain   resolved      March Osiris Medina DO

## 2023-05-16 NOTE — PROGRESS NOTES
SNF PROGRESS NOTE      Cc- weakness       Patient is a Ruffus Snare 80 y.o. female who is being seen at [facility]. She is laying in bed and states that therapy is going ok. No past medical history on file. Patient has no allergy information on record.     VS reviewed    Gen- Alert and oriented x2   Heart- RRR no murmur no LE edema   Lungs- CTA b/l no resp distress RA oxygen   Abd- bs x 4      Assessment and Plan    Left radial fracture   Saw ortho in hospital-- f/u OP  PRN oxycodone    Weakness   PT OT   Afib   on eliquis   Rate controlled   On cardizem/ metoprolol   HTN  On metoprolol   Depression   On zoloft   Combined CHF   Watch fluid balance   Abdominal Pain   resolved      Dedrick Banda DO

## 2023-05-17 ENCOUNTER — OFFICE VISIT (OUTPATIENT)
Dept: GERIATRIC MEDICINE | Age: 88
End: 2023-05-17

## 2023-05-17 DIAGNOSIS — R10.84 GENERALIZED ABDOMINAL PAIN: ICD-10-CM

## 2023-05-17 DIAGNOSIS — R53.1 WEAKNESS: ICD-10-CM

## 2023-05-17 DIAGNOSIS — I48.91 ATRIAL FIBRILLATION, UNSPECIFIED TYPE (HCC): ICD-10-CM

## 2023-05-17 DIAGNOSIS — S52.122S CLOSED DISPLACED FRACTURE OF HEAD OF LEFT RADIUS, SEQUELA: Primary | ICD-10-CM

## 2023-05-17 DIAGNOSIS — I50.43 ACUTE ON CHRONIC COMBINED SYSTOLIC AND DIASTOLIC CHF (CONGESTIVE HEART FAILURE) (HCC): ICD-10-CM

## 2023-05-18 NOTE — PROGRESS NOTES
SNF PROGRESS NOTE      Cc- weakness      Patient is a Maye Specking 80 y.o. female who is being seen at [facility]. She is laying in bed and continues to work with therapy. No past medical history on file. Patient has no allergy information on record.     VS reviewed    Gen- Alert and oriented x2   Heart- RRR no murmur no LE edema   Lungs- CTA b/l no resp distress RA oxygen   Abd- bs x 4        Assessment and Plan    Left radial fracture   Saw ortho in hospital-- f/u OP  PRN oxycodone    Weakness   PT OT   Afib   on eliquis   Rate controlled   On cardizem/ metoprolol   HTN  On metoprolol   Depression   On zoloft   Combined CHF   Watch fluid balance   Abdominal Pain   resolved      Cullen Row DO, Sandra Race

## 2023-05-18 NOTE — PROGRESS NOTES
SNF PROGRESS NOTE      Cc- weakness      Patient is a Jose Ferris 80 y.o. female who is being seen at [facility]. She is doing well with rehab. She denies any pain. Eating well. No past medical history on file. Patient has no allergy information on record. VS reviewed      Gen- Alert and oriented x2   Heart- RRR no murmur no LE edema   Lungs- CTA b/l no resp distress RA oxygen   Abd- bs x 4          There is no problem list on file for this patient.           Assessment and Plan    Left radial fracture   Saw ortho in hospital-- f/u OP  PRN oxycodone    Weakness   PT OT   Afib   on eliquis   Rate controlled   On cardizem/ metoprolol   HTN  On metoprolol   Depression   On zoloft   Combined CHF   Watch fluid balance   Abdominal Pain   resolved      Mercedez Damon DO

## 2023-05-18 NOTE — PROGRESS NOTES
SNF PROGRESS NOTE      Cc- weakness      Patient is a Marco Antonio Rojo 80 y.o. female who is being seen at [facility]. She is being seen for rehab on a left radial fracture and weakness. She denies any pain. No complaints and states that therapy is going well. No past medical history on file. Patient has no allergy information on record.     VS reviewed    Gen- Alert and oriented x2   Heart- RRR no murmur no LE edema   Lungs- CTA b/l no resp distress RA oxygen   Abd- bs x 4            Assessment and Plan    Left radial fracture   Saw ortho in hospital-- f/u OP  PRN oxycodone    Weakness   PT OT   Afib   on eliquis   Rate controlled   On cardizem/ metoprolol   HTN  On metoprolol   Depression   On zoloft   Combined CHF   Watch fluid balance   Abdominal Pain   resolved      Remus Charles DO, Mirna Simmonds

## 2023-05-19 ENCOUNTER — OFFICE VISIT (OUTPATIENT)
Dept: GERIATRIC MEDICINE | Age: 88
End: 2023-05-19

## 2023-05-19 DIAGNOSIS — I50.43 ACUTE ON CHRONIC COMBINED SYSTOLIC AND DIASTOLIC CHF (CONGESTIVE HEART FAILURE) (HCC): ICD-10-CM

## 2023-05-19 DIAGNOSIS — R10.84 GENERALIZED ABDOMINAL PAIN: ICD-10-CM

## 2023-05-19 DIAGNOSIS — R53.1 WEAKNESS: ICD-10-CM

## 2023-05-19 DIAGNOSIS — S52.122S CLOSED DISPLACED FRACTURE OF HEAD OF LEFT RADIUS, SEQUELA: Primary | ICD-10-CM

## 2023-05-19 DIAGNOSIS — I48.91 ATRIAL FIBRILLATION, UNSPECIFIED TYPE (HCC): ICD-10-CM

## 2023-05-20 NOTE — PROGRESS NOTES
SNF PROGRESS NOTE      Cc- weakness      Patient is a Mickey Leone 80 y.o. female who is being seen at [facility]. She is just getting back into bed from walking around. She asks when she gets to go home. No past medical history on file. Patient has no allergy information on record. VS reviewed    Gen- Alert and oriented x2   Heart- RRR no murmur no LE edema   Lungs- CTA b/l no resp distress RA oxygen   Abd- bs x 4              There is no problem list on file for this patient.           Assessment and Plan    Left radial fracture   Saw ortho in hospital-- f/u OP  PRN oxycodone    Weakness   PT OT   Afib   on eliquis   Rate controlled   On cardizem/ metoprolol   HTN  On metoprolol   Depression   On zoloft   Combined CHF   Watch fluid balance   Abdominal Pain   resolved      Jonn Lindo DO, Tobin Benavidez

## 2023-05-20 NOTE — PROGRESS NOTES
SNF PROGRESS NOTE      Cc- weakness      Patient is a Mariana Peñaloza 80 y.o. female who is being seen at [facility]. The patient is sitting on the side of her bed and she states that therapy is going well. No past medical history on file. Patient has no allergy information on record. VS reviewed    Gen- Alert and oriented x2   Heart- RRR no murmur no LE edema   Lungs- CTA b/l no resp distress RA oxygen   Abd- bs x 4      There is no problem list on file for this patient.           Assessment and Plan    Left radial fracture   Saw ortho in hospital-- f/u OP  PRN oxycodone    Weakness   PT OT   Afib   on eliquis   Rate controlled   On cardizem/ metoprolol   HTN  On metoprolol   Depression   On zoloft   Combined CHF   Watch fluid balance   Abdominal Pain   resolved      Tess Domingo DO

## 2023-05-22 ENCOUNTER — OFFICE VISIT (OUTPATIENT)
Dept: GERIATRIC MEDICINE | Age: 88
End: 2023-05-22

## 2023-05-22 DIAGNOSIS — I48.91 ATRIAL FIBRILLATION, UNSPECIFIED TYPE (HCC): ICD-10-CM

## 2023-05-22 DIAGNOSIS — S52.122S CLOSED DISPLACED FRACTURE OF HEAD OF LEFT RADIUS, SEQUELA: Primary | ICD-10-CM

## 2023-05-22 DIAGNOSIS — R53.1 WEAKNESS: ICD-10-CM

## 2023-05-22 DIAGNOSIS — I50.43 ACUTE ON CHRONIC COMBINED SYSTOLIC AND DIASTOLIC CHF (CONGESTIVE HEART FAILURE) (HCC): ICD-10-CM

## 2023-05-22 DIAGNOSIS — R10.84 GENERALIZED ABDOMINAL PAIN: ICD-10-CM

## 2023-05-24 ENCOUNTER — OFFICE VISIT (OUTPATIENT)
Dept: GERIATRIC MEDICINE | Age: 88
End: 2023-05-24

## 2023-05-24 DIAGNOSIS — I50.43 ACUTE ON CHRONIC COMBINED SYSTOLIC AND DIASTOLIC CHF (CONGESTIVE HEART FAILURE) (HCC): ICD-10-CM

## 2023-05-24 DIAGNOSIS — S52.122S CLOSED DISPLACED FRACTURE OF HEAD OF LEFT RADIUS, SEQUELA: Primary | ICD-10-CM

## 2023-05-24 DIAGNOSIS — R10.84 GENERALIZED ABDOMINAL PAIN: ICD-10-CM

## 2023-05-24 DIAGNOSIS — R53.1 WEAKNESS: ICD-10-CM

## 2023-05-24 DIAGNOSIS — I48.91 ATRIAL FIBRILLATION, UNSPECIFIED TYPE (HCC): ICD-10-CM

## 2023-05-25 ENCOUNTER — OFFICE VISIT (OUTPATIENT)
Dept: GERIATRIC MEDICINE | Age: 88
End: 2023-05-25
Payer: MEDICARE

## 2023-05-25 DIAGNOSIS — I48.91 ATRIAL FIBRILLATION, UNSPECIFIED TYPE (HCC): ICD-10-CM

## 2023-05-25 DIAGNOSIS — R53.1 WEAKNESS: ICD-10-CM

## 2023-05-25 DIAGNOSIS — R10.84 GENERALIZED ABDOMINAL PAIN: ICD-10-CM

## 2023-05-25 DIAGNOSIS — I50.43 ACUTE ON CHRONIC COMBINED SYSTOLIC AND DIASTOLIC CHF (CONGESTIVE HEART FAILURE) (HCC): ICD-10-CM

## 2023-05-25 DIAGNOSIS — S52.122S CLOSED DISPLACED FRACTURE OF HEAD OF LEFT RADIUS, SEQUELA: Primary | ICD-10-CM

## 2023-05-25 PROCEDURE — 1123F ACP DISCUSS/DSCN MKR DOCD: CPT | Performed by: INTERNAL MEDICINE

## 2023-05-25 PROCEDURE — 99308 SBSQ NF CARE LOW MDM 20: CPT | Performed by: INTERNAL MEDICINE

## 2023-05-26 ENCOUNTER — OFFICE VISIT (OUTPATIENT)
Dept: GERIATRIC MEDICINE | Age: 88
End: 2023-05-26

## 2023-05-26 DIAGNOSIS — R53.1 WEAKNESS: ICD-10-CM

## 2023-05-26 DIAGNOSIS — I48.91 ATRIAL FIBRILLATION, UNSPECIFIED TYPE (HCC): ICD-10-CM

## 2023-05-26 DIAGNOSIS — R10.84 GENERALIZED ABDOMINAL PAIN: ICD-10-CM

## 2023-05-26 DIAGNOSIS — S52.122S CLOSED DISPLACED FRACTURE OF HEAD OF LEFT RADIUS, SEQUELA: Primary | ICD-10-CM

## 2023-05-26 DIAGNOSIS — I50.43 ACUTE ON CHRONIC COMBINED SYSTOLIC AND DIASTOLIC CHF (CONGESTIVE HEART FAILURE) (HCC): ICD-10-CM

## 2023-05-26 NOTE — PROGRESS NOTES
SNF PROGRESS NOTE      Cc- weakness      Patient is a Domitila Amaro 80 y.o. female who is being seen at [facility]. She is in her room and sitting in the chair. She denies any pain. No past medical history on file. Patient has no allergy information on record. VS reviewed        Gen- Alert and oriented x2   Heart- RRR no murmur no LE edema   Lungs- CTA b/l no resp distress RA oxygen   Abd- bs x 4            There is no problem list on file for this patient.           Assessment and Plan    Left radial fracture   Saw ortho in hospital-- f/u OP  PRN oxycodone    Weakness   PT OT   Afib   on eliquis   Rate controlled   On cardizem/ metoprolol   HTN  On metoprolol   Depression   On zoloft   Combined CHF   Watch fluid balance   Abdominal Pain   resolved      Marilyn Guzman DO Arts

## 2023-05-27 NOTE — PROGRESS NOTES
Discharge Summary       Discharge Disposition home with home care    Discharge Condition stable       Discharge time 33 min       Medications   No current outpatient medications on file. No current facility-administered medications for this visit. Diet- as tolerated    Activity as tolerated         Brief SNF Course--     This is an 80 y.o. female who is being seen at [facility]. She was seen post radial fracture and weakness. She worked with PT OT           Discharge Diagnosis :    Left radial fracture   Weakness   Afib   HTN  Depression   Combined CHF   Abdominal Pain         Follow up --     PCP in 1-2 weeks.          Ramesh Rivas

## 2023-05-29 NOTE — PROGRESS NOTES
SNF PROGRESS NOTE      Cc- weakness      Patient is a [facility] 80 y.o. female who is being seen at [facility]. She is excited to go home and she is sitting in the chair. No past medical history on file. Patient has no allergy information on record. VS reviewed    Gen- Alert and oriented x2   Heart- RRR no murmur no LE edema   Lungs- CTA b/l no resp distress RA oxygen   Abd- bs x 4                There is no problem list on file for this patient.           Assessment and Plan    Left radial fracture   Saw ortho in hospital-- f/u OP  PRN oxycodone    Weakness   PT OT   Afib   on eliquis   Rate controlled   On cardizem/ metoprolol   HTN  On metoprolol   Depression   On zoloft   Combined CHF   Watch fluid balance   Abdominal Pain   resolved    Discharge tomorrow       Nicole Landin

## 2023-06-02 NOTE — PROGRESS NOTES
SNF PROGRESS NOTE      Cc- weakness      Patient is a Cassie Barker 80 y.o. female who is being seen at [facility]. She is in her room and laying in her bed. No complaints. Pain controlled. No past medical history on file. Patient has no allergy information on record.     VS reviewed    Gen- Alert and oriented x2   Heart- RRR no murmur no LE edema   Lungs- CTA b/l no resp distress RA oxygen   Abd- bs x 4      Assessment and Plan  Left radial fracture   Saw ortho in hospital-- f/u OP  PRN oxycodone    Weakness   PT OT   Afib   on eliquis   Rate controlled   On cardizem/ metoprolol   HTN  On metoprolol   Depression   On zoloft   Combined CHF   Watch fluid balance   Abdominal Pain   resolved        Cesar Guido DO

## 2023-07-24 RX ORDER — METOPROLOL TARTRATE 50 MG/1
TABLET, FILM COATED ORAL
Qty: 28 TABLET | Refills: 0 | OUTPATIENT
Start: 2023-07-24

## 2023-08-11 DIAGNOSIS — F41.9 ANXIETY: Primary | ICD-10-CM

## 2023-08-12 RX ORDER — CLONAZEPAM 0.5 MG/1
0.5 TABLET ORAL DAILY PRN
Qty: 45 TABLET | Refills: 1 | Status: SHIPPED | OUTPATIENT
Start: 2023-08-12 | End: 2023-11-10

## 2023-08-12 RX ORDER — CLONAZEPAM 0.5 MG/1
1 TABLET ORAL NIGHTLY
Qty: 90 TABLET | Refills: 1 | Status: SHIPPED | OUTPATIENT
Start: 2023-08-12 | End: 2023-11-10

## 2023-08-16 ENCOUNTER — OFFICE VISIT (OUTPATIENT)
Dept: GERIATRIC MEDICINE | Age: 88
End: 2023-08-16
Payer: MEDICARE

## 2023-08-16 DIAGNOSIS — K21.9 GASTROESOPHAGEAL REFLUX DISEASE WITHOUT ESOPHAGITIS: ICD-10-CM

## 2023-08-16 DIAGNOSIS — F33.0 MILD EPISODE OF RECURRENT MAJOR DEPRESSIVE DISORDER (HCC): ICD-10-CM

## 2023-08-16 DIAGNOSIS — I10 ESSENTIAL HYPERTENSION: Primary | ICD-10-CM

## 2023-08-16 DIAGNOSIS — K57.90 DIVERTICULOSIS: ICD-10-CM

## 2023-08-16 DIAGNOSIS — E78.5 HYPERLIPIDEMIA, UNSPECIFIED HYPERLIPIDEMIA TYPE: ICD-10-CM

## 2023-08-16 DIAGNOSIS — E55.9 VITAMIN D DEFICIENCY: ICD-10-CM

## 2023-08-16 DIAGNOSIS — F41.1 GAD (GENERALIZED ANXIETY DISORDER): ICD-10-CM

## 2023-08-16 DIAGNOSIS — I48.0 PAROXYSMAL ATRIAL FIBRILLATION (HCC): ICD-10-CM

## 2023-08-16 DIAGNOSIS — N18.31 STAGE 3A CHRONIC KIDNEY DISEASE (HCC): ICD-10-CM

## 2023-08-16 PROCEDURE — 1123F ACP DISCUSS/DSCN MKR DOCD: CPT | Performed by: PHYSICIAN ASSISTANT

## 2023-08-16 PROCEDURE — 99344 HOME/RES VST NEW MOD MDM 60: CPT | Performed by: PHYSICIAN ASSISTANT

## 2023-08-23 PROBLEM — I48.0 PAROXYSMAL ATRIAL FIBRILLATION (HCC): Status: ACTIVE | Noted: 2019-04-07

## 2023-08-23 PROBLEM — F33.0 MILD EPISODE OF RECURRENT MAJOR DEPRESSIVE DISORDER (HCC): Status: ACTIVE | Noted: 2017-06-05

## 2023-08-23 PROBLEM — R51.9 NONINTRACTABLE HEADACHE: Status: ACTIVE | Noted: 2017-04-05

## 2023-08-23 PROBLEM — E66.9 OBESITY, CLASS I, BMI 30-34.9: Status: ACTIVE | Noted: 2019-04-09

## 2023-08-23 PROBLEM — F51.04 PSYCHOPHYSIOLOGICAL INSOMNIA: Status: ACTIVE | Noted: 2018-10-03

## 2023-08-23 PROBLEM — E66.811 OBESITY, CLASS I, BMI 30-34.9: Status: ACTIVE | Noted: 2019-04-09

## 2023-08-23 PROBLEM — N18.30 CKD (CHRONIC KIDNEY DISEASE) STAGE 3, GFR 30-59 ML/MIN (HCC): Status: ACTIVE | Noted: 2020-08-27

## 2023-08-23 ASSESSMENT — ENCOUNTER SYMPTOMS
SHORTNESS OF BREATH: 0
CHOKING: 0
BACK PAIN: 1
COLOR CHANGE: 0
COUGH: 0

## 2023-08-23 NOTE — PROGRESS NOTES
Subjective:      Patient ID: Zoe Clarke is a pleasant 80 y.o. female who presents today for:  No chief complaint on file. Daniel Hinton PATIENT    Patient seen today as a new addition to facility. This 80-year-old female seen in her room. She is currently DNR-CCA status. NK FA, does have multiple drug allergies including tetracyclines, ACE inhibitors, sulfa, codeine, thiazides, ASA, quinolones, NSAIDs, estrogens, Nexium, magnesium, Avelox, Zestril, Coreg, clonidine, Keflex, vitamin D, amlodipine, and albuterol. Unsure of nature of exact responses medications in the past, high likelihood of false allergy list however they could account for. Having increased history of falls at home and had subsequent hospital stays. Did recently finish course of Augmentin for sialoadenitis, no further sequelae. Patient had a big fall in March with humeral fracture, recovering well but needs extra help with medication and ADL management. Did discuss adding routine annual labs early next month which family and patient in agreement. Additionally patient complains of some consistent constipation, will add some routine MiraLAX to this. Patient is on routine Klonopin and has been for several decades, will refill. No up-to-date vaccination history. Does have history of PCV 13 and PPV 23 vaccines respectively. Tdap up-to-date. No history of COVID vaccination, shingles, or flu vaccine. We will have nursing staff inquire on the recent history and update as needed. Patient is on regular diet no added salt. Patient overall exam within normal limits, no significant findings or complaints at this time. We will follow-up as needed.       Patient Active Problem List   Diagnosis    Choroidal nevus of right eye    CKD (chronic kidney disease) stage 3, GFR 30-59 ml/min (HCC)    Diverticulosis    Esophageal reflux    Essential hypertension    AMANDA (generalized anxiety disorder)    HLD (hyperlipidemia)

## 2023-09-02 LAB
BASOPHILS # BLD: 0 K/UL (ref 0–0.2)
BASOPHILS NFR BLD: 0.3 %
EOSINOPHIL # BLD: 0.1 K/UL (ref 0–0.7)
EOSINOPHIL NFR BLD: 1.7 %
ERYTHROCYTE [DISTWIDTH] IN BLOOD BY AUTOMATED COUNT: 14 % (ref 11.5–14.5)
HCT VFR BLD AUTO: 38.2 % (ref 37–47)
HGB BLD-MCNC: 13.1 G/DL (ref 12–16)
LYMPHOCYTES # BLD: 1.2 K/UL (ref 1–4.8)
LYMPHOCYTES NFR BLD: 19.9 %
MCH RBC QN AUTO: 33.3 PG (ref 27–31.3)
MCHC RBC AUTO-ENTMCNC: 34.4 % (ref 33–37)
MCV RBC AUTO: 96.9 FL (ref 79.4–94.8)
MONOCYTES # BLD: 0.4 K/UL (ref 0.2–0.8)
MONOCYTES NFR BLD: 7.3 %
NEUTROPHILS # BLD: 4.3 K/UL (ref 1.4–6.5)
NEUTS SEG NFR BLD: 70.8 %
PLATELET # BLD AUTO: 124 K/UL (ref 130–400)
RBC # BLD AUTO: 3.94 M/UL (ref 4.2–5.4)
WBC # BLD AUTO: 6.1 K/UL (ref 4.8–10.8)

## 2023-09-06 LAB
ALBUMIN SERPL-MCNC: 4.3 G/DL (ref 3.5–4.6)
ALP SERPL-CCNC: 57 U/L (ref 40–130)
ALT SERPL-CCNC: 12 U/L (ref 0–33)
ANION GAP SERPL CALCULATED.3IONS-SCNC: 11 MEQ/L (ref 9–15)
AST SERPL-CCNC: 16 U/L (ref 0–35)
BILIRUB DIRECT SERPL-MCNC: <0.2 MG/DL (ref 0–0.4)
BILIRUB INDIRECT SERPL-MCNC: NORMAL MG/DL (ref 0–0.6)
BILIRUB SERPL-MCNC: 0.5 MG/DL (ref 0.2–0.7)
BUN SERPL-MCNC: 19 MG/DL (ref 8–23)
CALCIUM SERPL-MCNC: 9.4 MG/DL (ref 8.5–9.9)
CHLORIDE SERPL-SCNC: 105 MEQ/L (ref 95–107)
CHOLEST SERPL-MCNC: 207 MG/DL (ref 0–199)
CO2 SERPL-SCNC: 27 MEQ/L (ref 20–31)
CREAT SERPL-MCNC: 1.07 MG/DL (ref 0.5–0.9)
ERYTHROCYTE [DISTWIDTH] IN BLOOD BY AUTOMATED COUNT: 14.1 % (ref 11.5–14.5)
GLUCOSE SERPL-MCNC: 85 MG/DL (ref 70–99)
HBA1C MFR BLD: 5.4 % (ref 4.8–5.9)
HCT VFR BLD AUTO: 38.3 % (ref 37–47)
HDLC SERPL-MCNC: 43 MG/DL (ref 40–59)
HGB BLD-MCNC: 13.2 G/DL (ref 12–16)
LDLC SERPL CALC-MCNC: 131 MG/DL (ref 0–129)
MCH RBC QN AUTO: 33.6 PG (ref 27–31.3)
MCHC RBC AUTO-ENTMCNC: 34.4 % (ref 33–37)
MCV RBC AUTO: 97.5 FL (ref 79.4–94.8)
PLATELET # BLD AUTO: 121 K/UL (ref 130–400)
POTASSIUM SERPL-SCNC: 3.9 MEQ/L (ref 3.4–4.9)
PROT SERPL-MCNC: 6.9 G/DL (ref 6.3–8)
RBC # BLD AUTO: 3.93 M/UL (ref 4.2–5.4)
SODIUM SERPL-SCNC: 143 MEQ/L (ref 135–144)
TRIGL SERPL-MCNC: 164 MG/DL (ref 0–150)
TSH SERPL-MCNC: 5.82 UIU/ML (ref 0.44–3.86)
VITAMIN D 25-HYDROXY: 31.1 NG/ML
WBC # BLD AUTO: 5.3 K/UL (ref 4.8–10.8)

## 2023-09-08 LAB
T3 FREE: 2.09 PG/ML (ref 2.02–4.43)
T4 FREE SERPL-MCNC: 0.68 NG/DL (ref 0.84–1.68)

## 2023-09-18 LAB
BNP BLD-MCNC: 7905 PG/ML
ERYTHROCYTE [DISTWIDTH] IN BLOOD BY AUTOMATED COUNT: 14.6 % (ref 11.5–14.5)
HCT VFR BLD AUTO: 34.6 % (ref 37–47)
HGB BLD-MCNC: 12 G/DL (ref 12–16)
MCH RBC QN AUTO: 33.7 PG (ref 27–31.3)
MCHC RBC AUTO-ENTMCNC: 34.8 % (ref 33–37)
MCV RBC AUTO: 96.9 FL (ref 79.4–94.8)
PLATELET # BLD AUTO: 130 K/UL (ref 130–400)
RBC # BLD AUTO: 3.57 M/UL (ref 4.2–5.4)
WBC # BLD AUTO: 5.5 K/UL (ref 4.8–10.8)

## 2023-09-19 ENCOUNTER — OFFICE VISIT (OUTPATIENT)
Dept: GERIATRIC MEDICINE | Age: 88
End: 2023-09-19
Payer: MEDICARE

## 2023-09-19 DIAGNOSIS — F41.1 GAD (GENERALIZED ANXIETY DISORDER): ICD-10-CM

## 2023-09-19 DIAGNOSIS — I48.0 PAROXYSMAL ATRIAL FIBRILLATION (HCC): ICD-10-CM

## 2023-09-19 DIAGNOSIS — I50.42 CHRONIC COMBINED SYSTOLIC AND DIASTOLIC HEART FAILURE (HCC): Primary | ICD-10-CM

## 2023-09-19 PROCEDURE — 1123F ACP DISCUSS/DSCN MKR DOCD: CPT | Performed by: PHYSICIAN ASSISTANT

## 2023-09-19 PROCEDURE — 4004F PT TOBACCO SCREEN RCVD TLK: CPT | Performed by: PHYSICIAN ASSISTANT

## 2023-09-19 PROCEDURE — 99348 HOME/RES VST EST LOW MDM 30: CPT | Performed by: PHYSICIAN ASSISTANT

## 2023-09-19 PROCEDURE — 1090F PRES/ABSN URINE INCON ASSESS: CPT | Performed by: PHYSICIAN ASSISTANT

## 2023-09-19 PROCEDURE — G8421 BMI NOT CALCULATED: HCPCS | Performed by: PHYSICIAN ASSISTANT

## 2023-09-25 LAB
ERYTHROCYTE [DISTWIDTH] IN BLOOD BY AUTOMATED COUNT: 13.4 % (ref 11.5–14.5)
HCT VFR BLD AUTO: 36.5 % (ref 37–47)
HGB BLD-MCNC: 12.1 G/DL (ref 12–16)
MCH RBC QN AUTO: 32.6 PG (ref 27–31.3)
MCHC RBC AUTO-ENTMCNC: 33.2 % (ref 33–37)
MCV RBC AUTO: 98.4 FL (ref 79.4–94.8)
PLATELET # BLD AUTO: 136 K/UL (ref 130–400)
RBC # BLD AUTO: 3.71 M/UL (ref 4.2–5.4)
WBC # BLD AUTO: 5.3 K/UL (ref 4.8–10.8)

## 2023-09-26 ENCOUNTER — OFFICE VISIT (OUTPATIENT)
Dept: GERIATRIC MEDICINE | Age: 88
End: 2023-09-26
Payer: MEDICARE

## 2023-09-26 DIAGNOSIS — F41.9 HYPOSOMNIA, INSOMNIA OR SLEEPLESSNESS ASSOCIATED WITH ANXIETY: ICD-10-CM

## 2023-09-26 DIAGNOSIS — F51.05 HYPOSOMNIA, INSOMNIA OR SLEEPLESSNESS ASSOCIATED WITH ANXIETY: ICD-10-CM

## 2023-09-26 DIAGNOSIS — F41.9 CHRONIC ANXIETY: Primary | ICD-10-CM

## 2023-09-26 PROCEDURE — 99348 HOME/RES VST EST LOW MDM 30: CPT | Performed by: PHYSICIAN ASSISTANT

## 2023-09-26 PROCEDURE — 4004F PT TOBACCO SCREEN RCVD TLK: CPT | Performed by: PHYSICIAN ASSISTANT

## 2023-09-26 PROCEDURE — G8421 BMI NOT CALCULATED: HCPCS | Performed by: PHYSICIAN ASSISTANT

## 2023-09-26 PROCEDURE — 1123F ACP DISCUSS/DSCN MKR DOCD: CPT | Performed by: PHYSICIAN ASSISTANT

## 2023-09-26 PROCEDURE — 1090F PRES/ABSN URINE INCON ASSESS: CPT | Performed by: PHYSICIAN ASSISTANT

## 2023-10-02 LAB
ERYTHROCYTE [DISTWIDTH] IN BLOOD BY AUTOMATED COUNT: 13.3 % (ref 11.5–14.5)
HCT VFR BLD AUTO: 36 % (ref 37–47)
HGB BLD-MCNC: 12.2 G/DL (ref 12–16)
MCH RBC QN AUTO: 33.4 PG (ref 27–31.3)
MCHC RBC AUTO-ENTMCNC: 33.9 % (ref 33–37)
MCV RBC AUTO: 98.6 FL (ref 79.4–94.8)
PLATELET # BLD AUTO: 126 K/UL (ref 130–400)
RBC # BLD AUTO: 3.65 M/UL (ref 4.2–5.4)
WBC # BLD AUTO: 5.2 K/UL (ref 4.8–10.8)

## 2023-10-03 ENCOUNTER — OFFICE VISIT (OUTPATIENT)
Dept: GERIATRIC MEDICINE | Age: 88
End: 2023-10-03
Payer: MEDICARE

## 2023-10-03 DIAGNOSIS — D69.6 THROMBOCYTOPENIA, UNSPECIFIED (HCC): ICD-10-CM

## 2023-10-03 DIAGNOSIS — Z86.2 H/O MACROCYTIC ANEMIA: Primary | ICD-10-CM

## 2023-10-03 PROCEDURE — G8484 FLU IMMUNIZE NO ADMIN: HCPCS | Performed by: PHYSICIAN ASSISTANT

## 2023-10-03 PROCEDURE — 99348 HOME/RES VST EST LOW MDM 30: CPT | Performed by: PHYSICIAN ASSISTANT

## 2023-10-03 PROCEDURE — 1090F PRES/ABSN URINE INCON ASSESS: CPT | Performed by: PHYSICIAN ASSISTANT

## 2023-10-03 PROCEDURE — G8421 BMI NOT CALCULATED: HCPCS | Performed by: PHYSICIAN ASSISTANT

## 2023-10-03 PROCEDURE — 4004F PT TOBACCO SCREEN RCVD TLK: CPT | Performed by: PHYSICIAN ASSISTANT

## 2023-10-03 PROCEDURE — 1123F ACP DISCUSS/DSCN MKR DOCD: CPT | Performed by: PHYSICIAN ASSISTANT

## 2023-10-05 PROBLEM — I11.0 HYPERTENSIVE HEART DISEASE WITH HEART FAILURE (HCC): Status: ACTIVE | Noted: 2021-09-23

## 2023-10-05 PROBLEM — I50.42 CHRONIC COMBINED SYSTOLIC AND DIASTOLIC HEART FAILURE (HCC): Status: ACTIVE | Noted: 2021-09-23

## 2023-10-05 PROBLEM — Z79.01 LONG TERM (CURRENT) USE OF ANTICOAGULANTS: Status: ACTIVE | Noted: 2021-09-23

## 2023-10-05 ASSESSMENT — ENCOUNTER SYMPTOMS
COLOR CHANGE: 0
SHORTNESS OF BREATH: 0
CHOKING: 0
COUGH: 0
BACK PAIN: 1

## 2023-10-05 NOTE — PROGRESS NOTES
Subjective:      Patient ID: Jonatan Walls is a pleasant 80 y.o. female who presents today for:  No chief complaint on file. [address]  Following up with patient for recent increased lethargy and tiredness. Patient does have longstanding history of combined CHF as well as PAF. She is on routine Eliquis twice daily 5 mg. Vital signs have been overall stable for the past several days. Patient denies any new symptoms. Did speak to her about possible CT scan low-dose for prophylactic monitoring. Patient denies interest.  Patient also brings up her Klonopin, she would like to attempt to change to as needed and decrease reliance on this medication. Will adjust orders. Patient Active Problem List   Diagnosis    Choroidal nevus of right eye    CKD (chronic kidney disease) stage 3, GFR 30-59 ml/min (HCC)    Diverticulosis    Esophageal reflux    Essential hypertension    AMANDA (generalized anxiety disorder)    HLD (hyperlipidemia)    Mild episode of recurrent major depressive disorder (HCC)    Nonintractable headache    Obesity, Class I, BMI 30-34.9    Primary osteoarthritis of right knee    Paroxysmal atrial fibrillation (HCC)    Psychophysiological insomnia    Vitamin D deficiency    Chronic combined systolic and diastolic heart failure (HCC)    Hypertensive heart disease with heart failure ([address] )    Long term (current) use of anticoagulants     No past medical history on file. No past surgical history on file. Social History     Socioeconomic History    Marital status:       Spouse name: Not on file    Number of children: Not on file    Years of education: Not on file    Highest education level: Not on file   Occupational History    Not on file   Tobacco Use    Smoking status: Not on file    Smokeless tobacco: Not on file   Substance and Sexual Activity    Alcohol use: Not on file    Drug use: Not on file    Sexual activity: Not on file   Other Topics Concern    Not on file

## 2023-10-09 LAB
FOLATE: >20 NG/ML
T3 FREE: 2.06 PG/ML (ref 2.02–4.43)
T4 FREE SERPL-MCNC: 0.75 NG/DL (ref 0.84–1.68)
TSH SERPL-MCNC: 3.33 UIU/ML (ref 0.44–3.86)
VITAMIN B-12: 288 PG/ML (ref 232–1245)

## 2023-10-16 ASSESSMENT — ENCOUNTER SYMPTOMS
COUGH: 0
COLOR CHANGE: 0
SHORTNESS OF BREATH: 0
CHOKING: 0
BACK PAIN: 1

## 2023-10-16 NOTE — PROGRESS NOTES
Subjective:      Patient ID: Lindsay Oh is a pleasant 80 y.o. female who presents today for:  No chief complaint on file. [address]    Following up with patient due to acute on chronic anxiety. Patient is on Klonopin daily as needed. Patient is requesting them to be left in her room as well for nighttime dosing. Did speak with her about possible issue with facility directives for controlled substance. Stating today she wants her Klonopin back to routine, she forgets to ask for it at times. We will oblige. We will discuss with adamant about leaving bedside dose for her to take when she is ready for bed. Patient Active Problem List   Diagnosis    Choroidal nevus of right eye    CKD (chronic kidney disease) stage 3, GFR 30-59 ml/min (HCC)    Diverticulosis    Esophageal reflux    Essential hypertension    AMANDA (generalized anxiety disorder)    HLD (hyperlipidemia)    Mild episode of recurrent major depressive disorder (HCC)    Nonintractable headache    Obesity, Class I, BMI 30-34.9    Primary osteoarthritis of right knee    Paroxysmal atrial fibrillation (HCC)    Psychophysiological insomnia    Vitamin D deficiency    Chronic combined systolic and diastolic heart failure (HCC)    Hypertensive heart disease with heart failure ([address])    Long term (current) use of anticoagulants     No past medical history on file. No past surgical history on file. Social History     Socioeconomic History    Marital status:       Spouse name: Not on file    Number of children: Not on file    Years of education: Not on file    Highest education level: Not on file   Occupational History    Not on file   Tobacco Use    Smoking status: Not on file    Smokeless tobacco: Not on file   Substance and Sexual Activity    Alcohol use: Not on file    Drug use: Not on file    Sexual activity: Not on file   Other Topics Concern    Not on file   Social History Narrative    Not on file     Social

## 2023-10-25 ENCOUNTER — OFFICE VISIT (OUTPATIENT)
Dept: GERIATRIC MEDICINE | Age: 88
End: 2023-10-25

## 2023-10-25 DIAGNOSIS — R60.0 BILATERAL EDEMA OF LOWER EXTREMITY: Primary | ICD-10-CM

## 2023-10-25 DIAGNOSIS — N18.31 STAGE 3A CHRONIC KIDNEY DISEASE (HCC): ICD-10-CM

## 2023-10-30 PROBLEM — D69.6 THROMBOCYTOPENIA, UNSPECIFIED (HCC): Status: ACTIVE | Noted: 2023-10-30

## 2023-10-30 ASSESSMENT — ENCOUNTER SYMPTOMS
CHOKING: 0
SHORTNESS OF BREATH: 0
COUGH: 0
BACK PAIN: 1
COLOR CHANGE: 0

## 2023-10-30 NOTE — PROGRESS NOTES
Subjective:      Patient ID: Hans Jimenez is a pleasant 80 y.o. female who presents today for:  No chief complaint on file. [address]    Patient seen today due to follow-up on anemia. Patient shown to have some evident macrocytic anemia per labs, MCV and MCH slowly rising over past several weeks while monitoring minor drops in H&H. Do not see recent folate B12 level or iron level. We will go and order these follow-up and next visit. Patient currently No orders for vitamin B12 or folate at the moment. She does take Eliquis. Routine multivitamin (i.e. Certa-Beverly). Additioanlly patient has a hx of thrombocytopenia, stable over past several weeks. No evident concern per basic labs. No new active bleeding or bruising at the moment. Intake of food and fluids is good; reliable intake daily. Patient Active Problem List   Diagnosis    Choroidal nevus of right eye    CKD (chronic kidney disease) stage 3, GFR 30-59 ml/min (HCC)    Diverticulosis    Esophageal reflux    Essential hypertension    AMANDA (generalized anxiety disorder)    HLD (hyperlipidemia)    Mild episode of recurrent major depressive disorder (HCC)    Nonintractable headache    Obesity, Class I, BMI 30-34.9    Primary osteoarthritis of right knee    Paroxysmal atrial fibrillation (HCC)    Psychophysiological insomnia    Vitamin D deficiency    Chronic combined systolic and diastolic heart failure (HCC)    Hypertensive heart disease with heart failure ([address])    Long term (current) use of anticoagulants    Thrombocytopenia, unspecified ([address])     No past medical history on file. No past surgical history on file. Social History     Socioeconomic History    Marital status:       Spouse name: Not on file    Number of children: Not on file    Years of education: Not on file    Highest education level: Not on file   Occupational History    Not on file   Tobacco Use    Smoking status: Not on file    Smokeless tobacco: Not on

## 2023-11-07 ENCOUNTER — APPOINTMENT (OUTPATIENT)
Dept: CT IMAGING | Age: 88
End: 2023-11-07
Payer: MEDICARE

## 2023-11-07 ENCOUNTER — HOSPITAL ENCOUNTER (EMERGENCY)
Age: 88
Discharge: HOME | End: 2023-11-07
Payer: MEDICARE

## 2023-11-07 ENCOUNTER — OFFICE VISIT (OUTPATIENT)
Dept: GERIATRIC MEDICINE | Age: 88
End: 2023-11-07

## 2023-11-07 ENCOUNTER — APPOINTMENT (OUTPATIENT)
Dept: GENERAL RADIOLOGY | Age: 88
End: 2023-11-07
Payer: MEDICARE

## 2023-11-07 VITALS
HEIGHT: 63 IN | RESPIRATION RATE: 15 BRPM | OXYGEN SATURATION: 96 % | TEMPERATURE: 98.2 F | DIASTOLIC BLOOD PRESSURE: 88 MMHG | HEART RATE: 93 BPM | WEIGHT: 171.96 LBS | BODY MASS INDEX: 30.47 KG/M2 | SYSTOLIC BLOOD PRESSURE: 166 MMHG

## 2023-11-07 DIAGNOSIS — F41.9 ANXIETY: ICD-10-CM

## 2023-11-07 DIAGNOSIS — S09.90XA CLOSED HEAD INJURY, INITIAL ENCOUNTER: Primary | ICD-10-CM

## 2023-11-07 DIAGNOSIS — U07.1 COVID-19 VIRUS INFECTION: Primary | ICD-10-CM

## 2023-11-07 DIAGNOSIS — I50.42 CHRONIC COMBINED SYSTOLIC AND DIASTOLIC HEART FAILURE (HCC): ICD-10-CM

## 2023-11-07 DIAGNOSIS — Z79.01 CHRONIC ANTICOAGULATION: ICD-10-CM
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
## 2023-11-07 DIAGNOSIS — R60.0 BILATERAL LOWER EXTREMITY EDEMA: ICD-10-CM

## 2023-11-07 LAB
ALBUMIN SERPL-MCNC: 4 G/DL (ref 3.5–4.6)
ALP SERPL-CCNC: 62 U/L (ref 40–130)
ALT SERPL-CCNC: 11 U/L (ref 0–33)
ANION GAP SERPL CALCULATED.3IONS-SCNC: 9 MEQ/L (ref 9–15)
APTT PPP: 36.6 SEC (ref 24.4–36.8)
AST SERPL-CCNC: 18 U/L (ref 0–35)
BASOPHILS # BLD: 0 K/UL (ref 0–0.2)
BASOPHILS NFR BLD: 0.3 %
BILIRUB SERPL-MCNC: 0.6 MG/DL (ref 0.2–0.7)
BUN SERPL-MCNC: 19 MG/DL (ref 8–23)
CALCIUM SERPL-MCNC: 9.2 MG/DL (ref 8.5–9.9)
CHLORIDE SERPL-SCNC: 98 MEQ/L (ref 95–107)
CK SERPL-CCNC: 38 U/L (ref 0–170)
CO2 SERPL-SCNC: 30 MEQ/L (ref 20–31)
CREAT SERPL-MCNC: 1.06 MG/DL (ref 0.5–0.9)
EOSINOPHIL # BLD: 0.1 K/UL (ref 0–0.7)
EOSINOPHIL NFR BLD: 2 %
ERYTHROCYTE [DISTWIDTH] IN BLOOD BY AUTOMATED COUNT: 13.2 % (ref 11.5–14.5)
ETHANOL PERCENT: NORMAL G/DL
ETHANOLAMINE SERPL-MCNC: <10 MG/DL (ref 0–0.08)
GLOBULIN SER CALC-MCNC: 3.2 G/DL (ref 2.3–3.5)
GLUCOSE SERPL-MCNC: 106 MG/DL (ref 70–99)
HCT VFR BLD AUTO: 37.6 % (ref 37–47)
HGB BLD-MCNC: 13 G/DL (ref 12–16)
INR PPP: 1.3
LYMPHOCYTES # BLD: 1.1 K/UL (ref 1–4.8)
LYMPHOCYTES NFR BLD: 17.5 %
MCH RBC QN AUTO: 33.9 PG (ref 27–31.3)
MCHC RBC AUTO-ENTMCNC: 34.6 % (ref 33–37)
MCV RBC AUTO: 97.9 FL (ref 79.4–94.8)
MONOCYTES # BLD: 0.6 K/UL (ref 0.2–0.8)
MONOCYTES NFR BLD: 9.4 %
NEUTROPHILS # BLD: 4.6 K/UL (ref 1.4–6.5)
NEUTS SEG NFR BLD: 70.3 %
PLATELET # BLD AUTO: 135 K/UL (ref 130–400)
POTASSIUM SERPL-SCNC: 3.8 MEQ/L (ref 3.4–4.9)
PROT SERPL-MCNC: 7.2 G/DL (ref 6.3–8)
PROTHROMBIN TIME: 16.5 SEC (ref 12.3–14.9)
RBC # BLD AUTO: 3.84 M/UL (ref 4.2–5.4)
SODIUM SERPL-SCNC: 137 MEQ/L (ref 135–144)
WBC # BLD AUTO: 6.5 K/UL (ref 4.8–10.8)

## 2023-11-07 PROCEDURE — 6370000000 HC RX 637 (ALT 250 FOR IP)

## 2023-11-07 PROCEDURE — 85025 COMPLETE CBC W/AUTO DIFF WBC: CPT

## 2023-11-07 PROCEDURE — 85730 THROMBOPLASTIN TIME PARTIAL: CPT

## 2023-11-07 PROCEDURE — 93005 ELECTROCARDIOGRAM TRACING: CPT

## 2023-11-07 PROCEDURE — 99285 EMERGENCY DEPT VISIT HI MDM: CPT

## 2023-11-07 PROCEDURE — 82550 ASSAY OF CK (CPK): CPT

## 2023-11-07 PROCEDURE — 36415 COLL VENOUS BLD VENIPUNCTURE: CPT

## 2023-11-07 PROCEDURE — 80053 COMPREHEN METABOLIC PANEL: CPT

## 2023-11-07 PROCEDURE — 70450 CT HEAD/BRAIN W/O DYE: CPT

## 2023-11-07 PROCEDURE — 72125 CT NECK SPINE W/O DYE: CPT

## 2023-11-07 PROCEDURE — 82077 ASSAY SPEC XCP UR&BREATH IA: CPT

## 2023-11-07 PROCEDURE — 85610 PROTHROMBIN TIME: CPT

## 2023-11-07 PROCEDURE — 70486 CT MAXILLOFACIAL W/O DYE: CPT

## 2023-11-07 RX ORDER — CLONAZEPAM 0.5 MG/1
1 TABLET ORAL NIGHTLY
Qty: 90 TABLET | Refills: 1 | Status: SHIPPED | OUTPATIENT
Start: 2023-11-07 | End: 2024-02-05

## 2023-11-07 RX ORDER — CLONAZEPAM 0.5 MG/1
0.5 TABLET ORAL DAILY PRN
Qty: 45 TABLET | Refills: 1 | Status: SHIPPED | OUTPATIENT
Start: 2023-11-07 | End: 2024-02-05

## 2023-11-07 RX ORDER — ACETAMINOPHEN 500 MG
1000 TABLET ORAL ONCE
Status: COMPLETED | OUTPATIENT
Start: 2023-11-07 | End: 2023-11-07

## 2023-11-07 RX ADMIN — ACETAMINOPHEN 1000 MG: 500 TABLET ORAL at 19:13

## 2023-11-07 ASSESSMENT — ENCOUNTER SYMPTOMS
COLOR CHANGE: 1
NAUSEA: 0
EYE PAIN: 0
COUGH: 0
ABDOMINAL PAIN: 0
PHOTOPHOBIA: 0
VOMITING: 0
SHORTNESS OF BREATH: 0
DIARRHEA: 0

## 2023-11-07 ASSESSMENT — PATIENT HEALTH QUESTIONNAIRE - PHQ9
1. LITTLE INTEREST OR PLEASURE IN DOING THINGS: 0
SUM OF ALL RESPONSES TO PHQ QUESTIONS 1-9: 0
2. FEELING DOWN, DEPRESSED OR HOPELESS: 0
SUM OF ALL RESPONSES TO PHQ QUESTIONS 1-9: 0
SUM OF ALL RESPONSES TO PHQ QUESTIONS 1-9: 0
SUM OF ALL RESPONSES TO PHQ9 QUESTIONS 1 & 2: 0
SUM OF ALL RESPONSES TO PHQ QUESTIONS 1-9: 0

## 2023-11-07 ASSESSMENT — PAIN DESCRIPTION - LOCATION
LOCATION: FACE;HEAD
LOCATION: HEAD

## 2023-11-07 ASSESSMENT — PAIN DESCRIPTION - ORIENTATION: ORIENTATION: ANTERIOR

## 2023-11-07 ASSESSMENT — PAIN SCALES - GENERAL
PAINLEVEL_OUTOF10: 5
PAINLEVEL_OUTOF10: 5

## 2023-11-07 ASSESSMENT — PAIN DESCRIPTION - DESCRIPTORS: DESCRIPTORS: ACHING

## 2023-11-07 ASSESSMENT — LIFESTYLE VARIABLES
HOW OFTEN DO YOU HAVE A DRINK CONTAINING ALCOHOL: NEVER
HOW MANY STANDARD DRINKS CONTAINING ALCOHOL DO YOU HAVE ON A TYPICAL DAY: PATIENT DOES NOT DRINK

## 2023-11-07 ASSESSMENT — PAIN - FUNCTIONAL ASSESSMENT
PAIN_FUNCTIONAL_ASSESSMENT: NONE - DENIES PAIN
PAIN_FUNCTIONAL_ASSESSMENT: 0-10

## 2023-11-07 ASSESSMENT — VISUAL ACUITY: OU: 1

## 2023-11-07 NOTE — ED PROVIDER NOTES
are mis-transcribed.)    ARIELLE Puga (electronically signed)  Attending Emergency Physician  Supervising Physician [place], [place]  11/07/23 2106       Kassie Puga  11/07/23 2116

## 2023-11-07 NOTE — ED TRIAGE NOTES
Patient in ED with c/o fall from standing. Patient states she hit her head and cheek but did not loose consciousness. Patient lives in [place] and tested positive for Covid on Monday. Patient is on eliquis blood thinner for what she state is AFIB. Patient alert and oriented x 4. Patient rates pain a 5 out of 10.

## 2023-11-08 LAB
ALBUMIN SERPL-MCNC: 3.8 G/DL (ref 3.5–4.6)
ALP SERPL-CCNC: 61 U/L (ref 40–130)
ALT SERPL-CCNC: 11 U/L (ref 0–33)
ANION GAP SERPL CALCULATED.3IONS-SCNC: 12 MEQ/L (ref 9–15)
AST SERPL-CCNC: 19 U/L (ref 0–35)
BILIRUB SERPL-MCNC: 0.6 MG/DL (ref 0.2–0.7)
BUN SERPL-MCNC: 17 MG/DL (ref 8–23)
CALCIUM SERPL-MCNC: 9.6 MG/DL (ref 8.5–9.9)
CHLORIDE SERPL-SCNC: 102 MEQ/L (ref 95–107)
CO2 SERPL-SCNC: 29 MEQ/L (ref 20–31)
CREAT SERPL-MCNC: 1.07 MG/DL (ref 0.5–0.9)
EKG ATRIAL RATE: 91 BPM
EKG Q-T INTERVAL: 396 MS
EKG QRS DURATION: 92 MS
EKG QTC CALCULATION (BAZETT): 487 MS
EKG R AXIS: -5 DEGREES
EKG T AXIS: 143 DEGREES
EKG VENTRICULAR RATE: 91 BPM
ERYTHROCYTE [DISTWIDTH] IN BLOOD BY AUTOMATED COUNT: 13 % (ref 11.5–14.5)
GLOBULIN SER CALC-MCNC: 3.1 G/DL (ref 2.3–3.5)
GLUCOSE SERPL-MCNC: 84 MG/DL (ref 70–99)
HCT VFR BLD AUTO: 37.6 % (ref 37–47)
HGB BLD-MCNC: 12.9 G/DL (ref 12–16)
LDH SERPL-CCNC: 227 U/L (ref 135–214)
MCH RBC QN AUTO: 33.5 PG (ref 27–31.3)
MCHC RBC AUTO-ENTMCNC: 34.3 % (ref 33–37)
MCV RBC AUTO: 97.7 FL (ref 79.4–94.8)
PLATELET # BLD AUTO: 136 K/UL (ref 130–400)
POTASSIUM SERPL-SCNC: 3.8 MEQ/L (ref 3.4–4.9)
PROT SERPL-MCNC: 6.9 G/DL (ref 6.3–8)
RBC # BLD AUTO: 3.85 M/UL (ref 4.2–5.4)
SODIUM SERPL-SCNC: 143 MEQ/L (ref 135–144)
WBC # BLD AUTO: 5.3 K/UL (ref 4.8–10.8)

## 2023-11-08 PROCEDURE — 93010 ELECTROCARDIOGRAM REPORT: CPT | Performed by: INTERNAL MEDICINE

## 2023-11-08 NOTE — ED NOTES
[facility] ETA [address] [facility]     Page, Karen RAROYO  11/07/23 3339

## 2023-11-08 NOTE — ED NOTES
Pt provided with discharge instructions and f/u care instructions. Pt verbalizes understanding; denies questions. Pt off unit via stretcher accompanied by Mary Jo Kaplan in stable condition.      Juana Duke RN  11/07/23 8578

## 2023-11-08 NOTE — ED NOTES
Labs obtained by this RN, labeled and sent to lab via tube system.        Kaye Recinos RN  11/07/23 1925

## 2023-11-08 NOTE — ED NOTES
Pt up to restroom at this time. Pt has a stable gait utilizing wheeled walker as observed by this RN.       Marnie Mixon RN  11/07/23 2125

## 2023-11-14 ENCOUNTER — OFFICE VISIT (OUTPATIENT)
Dept: GERIATRIC MEDICINE | Age: 88
End: 2023-11-14
Payer: MEDICARE

## 2023-11-14 DIAGNOSIS — Z91.81 HISTORY OF RECENT FALL: ICD-10-CM

## 2023-11-14 DIAGNOSIS — G44.209 TENSION HEADACHE: Primary | ICD-10-CM

## 2023-11-14 PROCEDURE — 1123F ACP DISCUSS/DSCN MKR DOCD: CPT | Performed by: PHYSICIAN ASSISTANT

## 2023-11-14 PROCEDURE — 99348 HOME/RES VST EST LOW MDM 30: CPT | Performed by: PHYSICIAN ASSISTANT

## 2023-11-14 PROCEDURE — 1036F TOBACCO NON-USER: CPT | Performed by: PHYSICIAN ASSISTANT

## 2023-11-14 PROCEDURE — 1090F PRES/ABSN URINE INCON ASSESS: CPT | Performed by: PHYSICIAN ASSISTANT

## 2023-11-14 PROCEDURE — G8484 FLU IMMUNIZE NO ADMIN: HCPCS | Performed by: PHYSICIAN ASSISTANT

## 2023-11-14 PROCEDURE — G8417 CALC BMI ABV UP PARAM F/U: HCPCS | Performed by: PHYSICIAN ASSISTANT

## 2023-11-28 ENCOUNTER — OFFICE VISIT (OUTPATIENT)
Dept: GERIATRIC MEDICINE | Age: 88
End: 2023-11-28
Payer: MEDICARE

## 2023-11-28 DIAGNOSIS — U07.1 COVID-19 VIRUS INFECTION: Primary | ICD-10-CM

## 2023-11-28 PROCEDURE — G8484 FLU IMMUNIZE NO ADMIN: HCPCS | Performed by: PHYSICIAN ASSISTANT

## 2023-11-28 PROCEDURE — G8417 CALC BMI ABV UP PARAM F/U: HCPCS | Performed by: PHYSICIAN ASSISTANT

## 2023-11-28 PROCEDURE — 1090F PRES/ABSN URINE INCON ASSESS: CPT | Performed by: PHYSICIAN ASSISTANT

## 2023-11-28 PROCEDURE — 99348 HOME/RES VST EST LOW MDM 30: CPT | Performed by: PHYSICIAN ASSISTANT

## 2023-11-28 PROCEDURE — 1123F ACP DISCUSS/DSCN MKR DOCD: CPT | Performed by: PHYSICIAN ASSISTANT

## 2023-11-28 PROCEDURE — 1036F TOBACCO NON-USER: CPT | Performed by: PHYSICIAN ASSISTANT

## 2023-12-07 ASSESSMENT — ENCOUNTER SYMPTOMS
COLOR CHANGE: 0
DIARRHEA: 0
CONSTIPATION: 0
WHEEZING: 0
SHORTNESS OF BREATH: 0
NAUSEA: 0
COUGH: 0

## 2023-12-08 NOTE — PROGRESS NOTES
negative. Objective:   VS: See Ruth. Reviewed. Physical Exam  Vitals reviewed. Constitutional:       General: She is not in acute distress. Appearance: Normal appearance. She is well-developed and normal weight. She is not ill-appearing or diaphoretic. HENT:      Head: Normocephalic and atraumatic. Nose: Nose normal. No congestion or rhinorrhea. Mouth/Throat:      Mouth: Mucous membranes are moist.      Pharynx: Oropharynx is clear. No oropharyngeal exudate or posterior oropharyngeal erythema. Neck:      Vascular: No JVD. Trachea: No tracheal deviation. Cardiovascular:      Rate and Rhythm: Normal rate and regular rhythm. Heart sounds: Normal heart sounds. Pulmonary:      Effort: Pulmonary effort is normal. No respiratory distress. Breath sounds: Wheezing present. Abdominal:      General: Bowel sounds are normal.      Palpations: Abdomen is soft. Tenderness: There is no abdominal tenderness. Musculoskeletal:         General: No tenderness or deformity. Normal range of motion. Skin:     General: Skin is warm and dry. Neurological:      Mental Status: She is alert and oriented to person, place, and time. Mental status is at baseline. Motor: Weakness present. Psychiatric:         Mood and Affect: Mood normal.         Behavior: Behavior normal. Behavior is cooperative. Thought Content: Thought content normal.         Assessment:       Diagnosis Orders   1. COVID-19 virus infection        2. Bilateral lower extremity edema        3. Chronic combined systolic and diastolic heart failure ([address])              Plan:      No orders of the defined types were placed in this encounter. No orders of the defined types were placed in this encounter. No new or worsening cardiopulmonary symptoms at this time. Continue current Paxlovid orders. Monitor labs. Continue Lasix as ordered. Patient has been compliant with Lasix up to this point.   Follow-up

## 2023-12-13 ASSESSMENT — ENCOUNTER SYMPTOMS
DIARRHEA: 0
NAUSEA: 0
COUGH: 0
CONSTIPATION: 0
COLOR CHANGE: 0
SHORTNESS OF BREATH: 0
WHEEZING: 0

## 2023-12-13 NOTE — PROGRESS NOTES
Reviewed. Physical Exam  Vitals reviewed. Constitutional:       General: She is not in acute distress. Appearance: Normal appearance. She is well-developed and normal weight. She is not ill-appearing or diaphoretic. HENT:      Head: Normocephalic and atraumatic. Nose: Nose normal. No congestion or rhinorrhea. Mouth/Throat:      Mouth: Mucous membranes are moist.      Pharynx: Oropharynx is clear. No oropharyngeal exudate or posterior oropharyngeal erythema. Neck:      Vascular: No JVD. Trachea: No tracheal deviation. Cardiovascular:      Rate and Rhythm: Normal rate and regular rhythm. Heart sounds: Normal heart sounds. Pulmonary:      Effort: Pulmonary effort is normal. No respiratory distress. Breath sounds: Wheezing present. Abdominal:      General: Bowel sounds are normal.      Palpations: Abdomen is soft. Tenderness: There is no abdominal tenderness. Musculoskeletal:         General: No deformity. Normal range of motion. Cervical back: Normal range of motion and neck supple. Tenderness (bilateral rear neck) present. Skin:     General: Skin is warm and dry. Neurological:      Mental Status: She is alert and oriented to person, place, and time. Mental status is at baseline. Motor: Weakness present. Psychiatric:         Mood and Affect: Mood normal.         Behavior: Behavior normal. Behavior is cooperative. Thought Content: Thought content normal.         Assessment:       Diagnosis Orders   1. Tension headache        2. History of recent fall              Plan:      No orders of the defined types were placed in this encounter. No orders of the defined types were placed in this encounter. Overall patient is stable. She does have some increased headache, seems to be associated with cont with APAP PRN. Monitor for changes in mentation, dizziness, or further falls. PT/OT as needed. No follow-ups on file.       Praveena Bacon,

## 2023-12-30 NOTE — PROGRESS NOTES
are negative. Objective:   VS: See Ruth. Reviewed. Physical Exam  Vitals (Reviewed , see ZULEYKA) and nursing note reviewed. Constitutional:       General: She is not in acute distress. Appearance: Normal appearance. She is well-developed and normal weight. She is not ill-appearing or diaphoretic. HENT:      Head: Normocephalic and atraumatic. Nose: Nose normal. No congestion or rhinorrhea. Mouth/Throat:      Mouth: Mucous membranes are moist.      Pharynx: Oropharynx is clear. No oropharyngeal exudate or posterior oropharyngeal erythema. Eyes:      Conjunctiva/sclera: Conjunctivae normal.   Neck:      Vascular: No JVD. Trachea: No tracheal deviation. Cardiovascular:      Rate and Rhythm: Normal rate and regular rhythm. Heart sounds: Normal heart sounds. Pulmonary:      Effort: Pulmonary effort is normal. No respiratory distress. Breath sounds: Normal breath sounds. No rhonchi. Abdominal:      General: Bowel sounds are normal.      Palpations: Abdomen is soft. Tenderness: There is no abdominal tenderness. Musculoskeletal:         General: No deformity. Normal range of motion. Skin:     General: Skin is warm and dry. Neurological:      Mental Status: She is alert and oriented to person, place, and time. Mental status is at baseline. Motor: Weakness present. Psychiatric:         Mood and Affect: Mood normal.         Behavior: Behavior normal. Behavior is cooperative. Thought Content: Thought content normal.         Assessment:       Diagnosis Orders   1. COVID-19 virus infection              Plan:      No orders of the defined types were placed in this encounter. No orders of the defined types were placed in this encounter. Patient overall making good recovery. No new symptoms at this time. Eating and drinking well. Toileting well. No new cardiorespiratory complaints.   Continue monitoring and continue current order set for the time

## 2024-01-03 ENCOUNTER — OFFICE VISIT (OUTPATIENT)
Dept: GERIATRIC MEDICINE | Age: 89
End: 2024-01-03
Payer: MEDICARE

## 2024-01-03 DIAGNOSIS — F33.0 MILD EPISODE OF RECURRENT MAJOR DEPRESSIVE DISORDER (HCC): ICD-10-CM

## 2024-01-03 DIAGNOSIS — I48.0 PAROXYSMAL ATRIAL FIBRILLATION (HCC): ICD-10-CM

## 2024-01-03 DIAGNOSIS — I11.0 HYPERTENSIVE HEART DISEASE WITH HEART FAILURE (HCC): Primary | ICD-10-CM

## 2024-01-03 PROCEDURE — 1090F PRES/ABSN URINE INCON ASSESS: CPT | Performed by: PHYSICIAN ASSISTANT

## 2024-01-03 PROCEDURE — 99349 HOME/RES VST EST MOD MDM 40: CPT | Performed by: PHYSICIAN ASSISTANT

## 2024-01-03 PROCEDURE — 1036F TOBACCO NON-USER: CPT | Performed by: PHYSICIAN ASSISTANT

## 2024-01-03 PROCEDURE — G8484 FLU IMMUNIZE NO ADMIN: HCPCS | Performed by: PHYSICIAN ASSISTANT

## 2024-01-03 PROCEDURE — G8417 CALC BMI ABV UP PARAM F/U: HCPCS | Performed by: PHYSICIAN ASSISTANT

## 2024-01-03 PROCEDURE — 1123F ACP DISCUSS/DSCN MKR DOCD: CPT | Performed by: PHYSICIAN ASSISTANT

## 2024-01-04 ASSESSMENT — PATIENT HEALTH QUESTIONNAIRE - PHQ9
8. MOVING OR SPEAKING SO SLOWLY THAT OTHER PEOPLE COULD HAVE NOTICED. OR THE OPPOSITE, BEING SO FIGETY OR RESTLESS THAT YOU HAVE BEEN MOVING AROUND A LOT MORE THAN USUAL: 0
10. IF YOU CHECKED OFF ANY PROBLEMS, HOW DIFFICULT HAVE THESE PROBLEMS MADE IT FOR YOU TO DO YOUR WORK, TAKE CARE OF THINGS AT HOME, OR GET ALONG WITH OTHER PEOPLE: 1
5. POOR APPETITE OR OVEREATING: 0
3. TROUBLE FALLING OR STAYING ASLEEP: 0
7. TROUBLE CONCENTRATING ON THINGS, SUCH AS READING THE NEWSPAPER OR WATCHING TELEVISION: 1
4. FEELING TIRED OR HAVING LITTLE ENERGY: 1
1. LITTLE INTEREST OR PLEASURE IN DOING THINGS: 1
2. FEELING DOWN, DEPRESSED OR HOPELESS: 2
SUM OF ALL RESPONSES TO PHQ QUESTIONS 1-9: 6
6. FEELING BAD ABOUT YOURSELF - OR THAT YOU ARE A FAILURE OR HAVE LET YOURSELF OR YOUR FAMILY DOWN: 1
SUM OF ALL RESPONSES TO PHQ QUESTIONS 1-9: 6
SUM OF ALL RESPONSES TO PHQ QUESTIONS 1-9: 6
SUM OF ALL RESPONSES TO PHQ9 QUESTIONS 1 & 2: 3
9. THOUGHTS THAT YOU WOULD BE BETTER OFF DEAD, OR OF HURTING YOURSELF: 0
SUM OF ALL RESPONSES TO PHQ QUESTIONS 1-9: 6

## 2024-01-04 ASSESSMENT — ENCOUNTER SYMPTOMS
WHEEZING: 0
COLOR CHANGE: 0
COUGH: 0
SHORTNESS OF BREATH: 0
CONSTIPATION: 0
DIARRHEA: 0
NAUSEA: 0

## 2024-01-04 NOTE — PROGRESS NOTES
Subjective:      Patient ID: Frances Meek is a pleasant 89 y.o. female who presents today for:  No chief complaint on file.      [facility]    Patient seen for acute concern of depression/?  SI.  Nurse states that patient was saying that she be better off having passed away.  Patient was recently on lockdown for COVID, subsequently improved and quarantine withdrawn.  Patient references some concerns over family making her worry.  Does have history of utilizing Klonopin routinely for anxiety issues.  Not requesting any new meds or increased dosing, however is on board (after long discussion) for seeing psychiatric service.  Is interested in both possible medication changes and or counseling sessions.  She denies any acute physical changes.  She did mention having woken up from a dream with some chest/left arm discomfort.  Patient refusing cardiac workup today, however she did have recent visit with her cardiologist with [facility].  If any further changes will order EKG and labs and refer to cardiology.        Patient Active Problem List   Diagnosis    Choroidal nevus of right eye    CKD (chronic kidney disease) stage 3, GFR 30-59 ml/min (HCC)    Diverticulosis    Esophageal reflux    Essential hypertension    AMANDA (generalized anxiety disorder)    HLD (hyperlipidemia)    Mild episode of recurrent major depressive disorder (HCC)    Nonintractable headache    Obesity, Class I, BMI 30-34.9    Primary osteoarthritis of right knee    Paroxysmal atrial fibrillation (HCC)    Psychophysiological insomnia    Vitamin D deficiency    Chronic combined systolic and diastolic heart failure (HCC)    Hypertensive heart disease with heart failure (HCC)    Long term (current) use of anticoagulants    Thrombocytopenia, unspecified (HCC)     Past Medical History:   Diagnosis Date    Atrial fibrillation (HCC)      No past surgical history on file.  Social History     Socioeconomic History    Marital

## 2024-01-09 ENCOUNTER — OFFICE VISIT (OUTPATIENT)
Dept: GERIATRIC MEDICINE | Age: 89
End: 2024-01-09
Payer: MEDICARE

## 2024-01-09 DIAGNOSIS — Z72.820 POOR SLEEP: Primary | ICD-10-CM

## 2024-01-09 DIAGNOSIS — F41.9 ANXIETY: ICD-10-CM

## 2024-01-09 DIAGNOSIS — F33.0 MILD EPISODE OF RECURRENT MAJOR DEPRESSIVE DISORDER (HCC): ICD-10-CM

## 2024-01-09 PROCEDURE — G8484 FLU IMMUNIZE NO ADMIN: HCPCS | Performed by: PHYSICIAN ASSISTANT

## 2024-01-09 PROCEDURE — 1123F ACP DISCUSS/DSCN MKR DOCD: CPT | Performed by: PHYSICIAN ASSISTANT

## 2024-01-09 PROCEDURE — G8417 CALC BMI ABV UP PARAM F/U: HCPCS | Performed by: PHYSICIAN ASSISTANT

## 2024-01-09 PROCEDURE — 1036F TOBACCO NON-USER: CPT | Performed by: PHYSICIAN ASSISTANT

## 2024-01-09 PROCEDURE — 1090F PRES/ABSN URINE INCON ASSESS: CPT | Performed by: PHYSICIAN ASSISTANT

## 2024-01-09 PROCEDURE — 99348 HOME/RES VST EST LOW MDM 30: CPT | Performed by: PHYSICIAN ASSISTANT

## 2024-01-10 LAB
AVERAGE GLUCOSE: NORMAL
HBA1C MFR BLD: 4.9 %
HBA1C MFR BLD: 4.9 % (ref 4.8–5.9)

## 2024-01-10 RX ORDER — CLONAZEPAM 0.5 MG/1
TABLET ORAL
Qty: 60 TABLET | Refills: 0 | Status: SHIPPED | OUTPATIENT
Start: 2024-01-10 | End: 2024-04-09

## 2024-01-25 ENCOUNTER — OFFICE VISIT (OUTPATIENT)
Dept: GERIATRIC MEDICINE | Age: 89
End: 2024-01-25
Payer: MEDICARE

## 2024-01-25 DIAGNOSIS — F41.8 ANXIETY ABOUT HEALTH: Primary | ICD-10-CM

## 2024-01-25 PROCEDURE — 99348 HOME/RES VST EST LOW MDM 30: CPT | Performed by: PHYSICIAN ASSISTANT

## 2024-01-25 PROCEDURE — 1090F PRES/ABSN URINE INCON ASSESS: CPT | Performed by: PHYSICIAN ASSISTANT

## 2024-01-25 PROCEDURE — 1036F TOBACCO NON-USER: CPT | Performed by: PHYSICIAN ASSISTANT

## 2024-01-25 PROCEDURE — 1123F ACP DISCUSS/DSCN MKR DOCD: CPT | Performed by: PHYSICIAN ASSISTANT

## 2024-01-25 PROCEDURE — G8417 CALC BMI ABV UP PARAM F/U: HCPCS | Performed by: PHYSICIAN ASSISTANT

## 2024-01-25 PROCEDURE — G8484 FLU IMMUNIZE NO ADMIN: HCPCS | Performed by: PHYSICIAN ASSISTANT

## 2024-01-29 ENCOUNTER — HOSPITAL ENCOUNTER (INPATIENT)
Age: 89
LOS: 3 days | Discharge: HOME | DRG: 291 | End: 2024-02-01
Attending: EMERGENCY MEDICINE | Admitting: INTERNAL MEDICINE
Payer: MEDICARE

## 2024-01-29 ENCOUNTER — APPOINTMENT (OUTPATIENT)
Dept: GENERAL RADIOLOGY | Age: 89
DRG: 291 | End: 2024-01-29
Payer: MEDICARE

## 2024-01-29 DIAGNOSIS — I50.9 CONGESTIVE HEART FAILURE, UNSPECIFIED HF CHRONICITY, UNSPECIFIED HEART FAILURE TYPE (HCC): Primary | ICD-10-CM

## 2024-01-29 DIAGNOSIS — I50.31 ACUTE DIASTOLIC (CONGESTIVE) HEART FAILURE (HCC): ICD-10-CM

## 2024-01-29 LAB
ALBUMIN SERPL-MCNC: 4.2 G/DL (ref 3.5–4.6)
ALP SERPL-CCNC: 67 U/L (ref 40–130)
ALT SERPL-CCNC: 17 U/L (ref 0–33)
ANION GAP SERPL CALCULATED.3IONS-SCNC: 9 MEQ/L (ref 9–15)
AST SERPL-CCNC: 18 U/L (ref 0–35)
BASOPHILS # BLD: 0 K/UL (ref 0–0.2)
BASOPHILS NFR BLD: 0.4 %
BILIRUB SERPL-MCNC: 0.7 MG/DL (ref 0.2–0.7)
BNP BLD-MCNC: NORMAL PG/ML
BUN SERPL-MCNC: 17 MG/DL (ref 8–23)
CALCIUM SERPL-MCNC: 9.2 MG/DL (ref 8.5–9.9)
CHLORIDE SERPL-SCNC: 103 MEQ/L (ref 95–107)
CO2 SERPL-SCNC: 27 MEQ/L (ref 20–31)
CREAT SERPL-MCNC: 0.98 MG/DL (ref 0.5–0.9)
EOSINOPHIL # BLD: 0.1 K/UL (ref 0–0.7)
EOSINOPHIL NFR BLD: 1.6 %
ERYTHROCYTE [DISTWIDTH] IN BLOOD BY AUTOMATED COUNT: 12.8 % (ref 11.5–14.5)
GLOBULIN SER CALC-MCNC: 2.8 G/DL (ref 2.3–3.5)
GLUCOSE SERPL-MCNC: 100 MG/DL (ref 70–99)
HCT VFR BLD AUTO: 38.7 % (ref 37–47)
HGB BLD-MCNC: 13.4 G/DL (ref 12–16)
INFLUENZA A BY PCR: NEGATIVE
INFLUENZA B BY PCR: NEGATIVE
LEFT VENTRICULAR EJECTION FRACTION MODE: NORMAL
LV EF: 50 %
LYMPHOCYTES # BLD: 1.3 K/UL (ref 1–4.8)
LYMPHOCYTES NFR BLD: 19.1 %
MCH RBC QN AUTO: 33.5 PG (ref 27–31.3)
MCHC RBC AUTO-ENTMCNC: 34.6 % (ref 33–37)
MCV RBC AUTO: 96.8 FL (ref 79.4–94.8)
MONOCYTES # BLD: 0.5 K/UL (ref 0.2–0.8)
MONOCYTES NFR BLD: 6.8 %
NEUTROPHILS # BLD: 5 K/UL (ref 1.4–6.5)
NEUTS SEG NFR BLD: 71.8 %
PLATELET # BLD AUTO: 144 K/UL (ref 130–400)
POTASSIUM SERPL-SCNC: 4.1 MEQ/L (ref 3.4–4.9)
PROT SERPL-MCNC: 7 G/DL (ref 6.3–8)
RBC # BLD AUTO: 4 M/UL (ref 4.2–5.4)
SARS-COV-2 RDRP RESP QL NAA+PROBE: NOT DETECTED
SODIUM SERPL-SCNC: 139 MEQ/L (ref 135–144)
TROPONIN, HIGH SENSITIVITY: 17 NG/L (ref 0–19)
TROPONIN, HIGH SENSITIVITY: 18 NG/L (ref 0–19)
TSH SERPL-MCNC: 4.28 UIU/ML (ref 0.44–3.86)
WBC # BLD AUTO: 7 K/UL (ref 4.8–10.8)

## 2024-01-29 PROCEDURE — 6360000002 HC RX W HCPCS: Performed by: EMERGENCY MEDICINE

## 2024-01-29 PROCEDURE — 99223 1ST HOSP IP/OBS HIGH 75: CPT | Performed by: INTERNAL MEDICINE

## 2024-01-29 PROCEDURE — 2580000003 HC RX 258: Performed by: INTERNAL MEDICINE

## 2024-01-29 PROCEDURE — 71045 X-RAY EXAM CHEST 1 VIEW: CPT

## 2024-01-29 PROCEDURE — 84443 ASSAY THYROID STIM HORMONE: CPT

## 2024-01-29 PROCEDURE — 6370000000 HC RX 637 (ALT 250 FOR IP): Performed by: INTERNAL MEDICINE

## 2024-01-29 PROCEDURE — 99285 EMERGENCY DEPT VISIT HI MDM: CPT

## 2024-01-29 PROCEDURE — 83880 ASSAY OF NATRIURETIC PEPTIDE: CPT

## 2024-01-29 PROCEDURE — 87502 INFLUENZA DNA AMP PROBE: CPT

## 2024-01-29 PROCEDURE — 2060000000 HC ICU INTERMEDIATE R&B

## 2024-01-29 PROCEDURE — 87635 SARS-COV-2 COVID-19 AMP PRB: CPT

## 2024-01-29 PROCEDURE — 84484 ASSAY OF TROPONIN QUANT: CPT

## 2024-01-29 PROCEDURE — 36415 COLL VENOUS BLD VENIPUNCTURE: CPT

## 2024-01-29 PROCEDURE — 80053 COMPREHEN METABOLIC PANEL: CPT

## 2024-01-29 PROCEDURE — 93005 ELECTROCARDIOGRAM TRACING: CPT | Performed by: EMERGENCY MEDICINE

## 2024-01-29 PROCEDURE — 85025 COMPLETE CBC W/AUTO DIFF WBC: CPT

## 2024-01-29 RX ORDER — LORATADINE 10 MG/1
10 CAPSULE, LIQUID FILLED ORAL DAILY PRN
COMMUNITY

## 2024-01-29 RX ORDER — LEVOTHYROXINE SODIUM 0.03 MG/1
25 TABLET ORAL DAILY
COMMUNITY

## 2024-01-29 RX ORDER — ACETAMINOPHEN 500 MG
500 TABLET ORAL EVERY 6 HOURS PRN
COMMUNITY

## 2024-01-29 RX ORDER — POTASSIUM CHLORIDE 20 MEQ/1
40 TABLET, EXTENDED RELEASE ORAL PRN
Status: DISCONTINUED | OUTPATIENT
Start: 2024-01-29 | End: 2024-02-01 | Stop reason: HOSPADM

## 2024-01-29 RX ORDER — POLYETHYLENE GLYCOL 3350 17 G/17G
17 POWDER, FOR SOLUTION ORAL DAILY
COMMUNITY

## 2024-01-29 RX ORDER — CLONAZEPAM 0.5 MG/1
0.5 TABLET ORAL DAILY PRN
Status: DISCONTINUED | OUTPATIENT
Start: 2024-01-29 | End: 2024-02-01 | Stop reason: HOSPADM

## 2024-01-29 RX ORDER — FUROSEMIDE 10 MG/ML
20 INJECTION INTRAMUSCULAR; INTRAVENOUS 2 TIMES DAILY
Status: DISPENSED | OUTPATIENT
Start: 2024-01-29 | End: 2024-01-30

## 2024-01-29 RX ORDER — SERTRALINE HYDROCHLORIDE 100 MG/1
100 TABLET, FILM COATED ORAL DAILY
COMMUNITY

## 2024-01-29 RX ORDER — POTASSIUM CHLORIDE 20 MEQ/1
20 TABLET, EXTENDED RELEASE ORAL DAILY
Status: DISCONTINUED | OUTPATIENT
Start: 2024-01-29 | End: 2024-02-01 | Stop reason: HOSPADM

## 2024-01-29 RX ORDER — HYDRALAZINE HYDROCHLORIDE 20 MG/ML
10 INJECTION INTRAMUSCULAR; INTRAVENOUS EVERY 6 HOURS PRN
Status: DISCONTINUED | OUTPATIENT
Start: 2024-01-29 | End: 2024-02-01 | Stop reason: HOSPADM

## 2024-01-29 RX ORDER — SODIUM CHLORIDE 0.9 % (FLUSH) 0.9 %
5-40 SYRINGE (ML) INJECTION EVERY 12 HOURS SCHEDULED
Status: DISCONTINUED | OUTPATIENT
Start: 2024-01-29 | End: 2024-02-01 | Stop reason: HOSPADM

## 2024-01-29 RX ORDER — FUROSEMIDE 10 MG/ML
20 INJECTION INTRAMUSCULAR; INTRAVENOUS ONCE
Status: COMPLETED | OUTPATIENT
Start: 2024-01-29 | End: 2024-01-29

## 2024-01-29 RX ORDER — SENNA AND DOCUSATE SODIUM 50; 8.6 MG/1; MG/1
1 TABLET, FILM COATED ORAL DAILY PRN
COMMUNITY

## 2024-01-29 RX ORDER — SODIUM CHLORIDE 9 MG/ML
INJECTION, SOLUTION INTRAVENOUS PRN
Status: DISCONTINUED | OUTPATIENT
Start: 2024-01-29 | End: 2024-02-01 | Stop reason: HOSPADM

## 2024-01-29 RX ORDER — SODIUM CHLORIDE 0.9 % (FLUSH) 0.9 %
5-40 SYRINGE (ML) INJECTION PRN
Status: DISCONTINUED | OUTPATIENT
Start: 2024-01-29 | End: 2024-02-01 | Stop reason: HOSPADM

## 2024-01-29 RX ORDER — ONDANSETRON 4 MG/1
4 TABLET, ORALLY DISINTEGRATING ORAL EVERY 8 HOURS PRN
Status: DISCONTINUED | OUTPATIENT
Start: 2024-01-29 | End: 2024-02-01 | Stop reason: HOSPADM

## 2024-01-29 RX ORDER — POTASSIUM CHLORIDE 7.45 MG/ML
10 INJECTION INTRAVENOUS PRN
Status: DISCONTINUED | OUTPATIENT
Start: 2024-01-29 | End: 2024-02-01 | Stop reason: HOSPADM

## 2024-01-29 RX ORDER — MAGNESIUM SULFATE IN WATER 40 MG/ML
2000 INJECTION, SOLUTION INTRAVENOUS PRN
Status: DISCONTINUED | OUTPATIENT
Start: 2024-01-29 | End: 2024-02-01 | Stop reason: HOSPADM

## 2024-01-29 RX ORDER — VIT C/E/CUPERIC/ZINC/LUTEIN 226-90-0.8
1 CAPSULE ORAL DAILY
COMMUNITY

## 2024-01-29 RX ORDER — ACETAMINOPHEN 325 MG/1
650 TABLET ORAL EVERY 6 HOURS PRN
Status: DISCONTINUED | OUTPATIENT
Start: 2024-01-29 | End: 2024-02-01 | Stop reason: HOSPADM

## 2024-01-29 RX ORDER — ENOXAPARIN SODIUM 100 MG/ML
40 INJECTION SUBCUTANEOUS DAILY
Status: DISCONTINUED | OUTPATIENT
Start: 2024-01-29 | End: 2024-01-29

## 2024-01-29 RX ORDER — ONDANSETRON 2 MG/ML
4 INJECTION INTRAMUSCULAR; INTRAVENOUS EVERY 6 HOURS PRN
Status: DISCONTINUED | OUTPATIENT
Start: 2024-01-29 | End: 2024-02-01 | Stop reason: HOSPADM

## 2024-01-29 RX ORDER — SERTRALINE HYDROCHLORIDE 25 MG/1
75 TABLET, FILM COATED ORAL DAILY
COMMUNITY

## 2024-01-29 RX ORDER — DILTIAZEM HYDROCHLORIDE 180 MG/1
180 CAPSULE, EXTENDED RELEASE ORAL DAILY
Status: ON HOLD | COMMUNITY
End: 2024-02-01 | Stop reason: HOSPADM

## 2024-01-29 RX ORDER — DILTIAZEM HYDROCHLORIDE 60 MG/1
60 TABLET, FILM COATED ORAL EVERY 6 HOURS SCHEDULED
Status: DISCONTINUED | OUTPATIENT
Start: 2024-01-29 | End: 2024-02-01

## 2024-01-29 RX ORDER — BISACODYL 10 MG
10 SUPPOSITORY, RECTAL RECTAL DAILY PRN
COMMUNITY

## 2024-01-29 RX ORDER — METOPROLOL TARTRATE 50 MG/1
50 TABLET, FILM COATED ORAL 2 TIMES DAILY
Status: ON HOLD | COMMUNITY
End: 2024-02-01 | Stop reason: HOSPADM

## 2024-01-29 RX ORDER — POTASSIUM CHLORIDE 750 MG/1
10 CAPSULE, EXTENDED RELEASE ORAL DAILY
COMMUNITY

## 2024-01-29 RX ORDER — POLYETHYLENE GLYCOL 3350 17 G/17G
17 POWDER, FOR SOLUTION ORAL DAILY PRN
Status: DISCONTINUED | OUTPATIENT
Start: 2024-01-29 | End: 2024-02-01 | Stop reason: HOSPADM

## 2024-01-29 RX ORDER — FUROSEMIDE 20 MG/1
20 TABLET ORAL DAILY
COMMUNITY

## 2024-01-29 RX ORDER — METOPROLOL TARTRATE 50 MG/1
50 TABLET, FILM COATED ORAL 2 TIMES DAILY
Status: DISCONTINUED | OUTPATIENT
Start: 2024-01-29 | End: 2024-01-31

## 2024-01-29 RX ORDER — M-VIT,TX,IRON,MINS/CALC/FOLIC 27MG-0.4MG
1 TABLET ORAL DAILY
COMMUNITY

## 2024-01-29 RX ORDER — ACETAMINOPHEN 650 MG/1
650 SUPPOSITORY RECTAL EVERY 6 HOURS PRN
Status: DISCONTINUED | OUTPATIENT
Start: 2024-01-29 | End: 2024-02-01 | Stop reason: HOSPADM

## 2024-01-29 RX ORDER — DOCUSATE SODIUM 100 MG/1
100 CAPSULE, LIQUID FILLED ORAL 2 TIMES DAILY PRN
COMMUNITY

## 2024-01-29 RX ORDER — CLONAZEPAM 0.5 MG/1
0.5 TABLET ORAL NIGHTLY
Status: DISCONTINUED | OUTPATIENT
Start: 2024-01-29 | End: 2024-02-01 | Stop reason: HOSPADM

## 2024-01-29 RX ADMIN — METOPROLOL TARTRATE 50 MG: 50 TABLET ORAL at 22:04

## 2024-01-29 RX ADMIN — CLONAZEPAM 0.5 MG: 0.5 TABLET ORAL at 22:10

## 2024-01-29 RX ADMIN — APIXABAN 5 MG: 5 TABLET, FILM COATED ORAL at 22:04

## 2024-01-29 RX ADMIN — POTASSIUM CHLORIDE 20 MEQ: 1500 TABLET, EXTENDED RELEASE ORAL at 18:00

## 2024-01-29 RX ADMIN — FUROSEMIDE 20 MG: 10 INJECTION, SOLUTION INTRAMUSCULAR; INTRAVENOUS at 14:40

## 2024-01-29 RX ADMIN — CLONAZEPAM 0.5 MG: 0.5 TABLET ORAL at 22:04

## 2024-01-29 RX ADMIN — ACETAMINOPHEN 650 MG: 325 TABLET ORAL at 22:14

## 2024-01-29 RX ADMIN — DILTIAZEM HYDROCHLORIDE 60 MG: 60 TABLET, FILM COATED ORAL at 18:00

## 2024-01-29 RX ADMIN — Medication 10 ML: at 22:17

## 2024-01-29 ASSESSMENT — PAIN - FUNCTIONAL ASSESSMENT: PAIN_FUNCTIONAL_ASSESSMENT: NONE - DENIES PAIN

## 2024-01-29 ASSESSMENT — PAIN SCALES - WONG BAKER: WONGBAKER_NUMERICALRESPONSE: 0

## 2024-01-29 ASSESSMENT — LIFESTYLE VARIABLES
HOW MANY STANDARD DRINKS CONTAINING ALCOHOL DO YOU HAVE ON A TYPICAL DAY: PATIENT DOES NOT DRINK
HOW OFTEN DO YOU HAVE A DRINK CONTAINING ALCOHOL: NEVER

## 2024-01-29 ASSESSMENT — PAIN DESCRIPTION - LOCATION: LOCATION: BACK

## 2024-01-29 ASSESSMENT — ENCOUNTER SYMPTOMS
CHEST TIGHTNESS: 1
SHORTNESS OF BREATH: 1

## 2024-01-29 ASSESSMENT — PAIN SCALES - GENERAL: PAINLEVEL_OUTOF10: 5

## 2024-01-29 NOTE — ED TRIAGE NOTES
Pt presents for SOB from SNF, states she had covid in November. Pt states she has had SOB since she has had an irregular heart beat. Pt placed on cardiac monitor and showing A fib at a controlled rate 79. Pt placed on continuous pulse ox and 94% on room air

## 2024-01-29 NOTE — CARE COORDINATION
Dtr wants passed onto physician that Assisted Living has been trying to reduce her klonopin dose and she is not tolerating well, not sleeping. She has been \"on it for 20 yrs\" and her previous DrLuana Wanted it left alone. Message to attending re: such.    A new document DNRCCA no intubation signed in ED.    Electronically signed by Fabby Jones RN, BSN on 1/29/2024 at 2:59 PM      Case Management Assessment  Initial Evaluation    Date/Time of Evaluation: 1/29/2024 2:59 PM  Assessment Completed by: Fabby Jones, DENISE, BSN    If patient is discharged prior to next notation, then this note serves as note for discharge by case management.    Patient Name: Frances Meek                   YOB: 1931  Diagnosis: Acute diastolic (congestive) heart failure (HCC) [I50.31]                   Date / Time: 1/29/2024 12:12 PM    Patient Admission Status: Inpatient   Readmission Risk (Low < 19, Mod (19-27), High > 27): No data recorded  Current PCP: Braydon Francis MD  PCP verified by ? Yes    Chart Reviewed: Yes      History Provided by: Patient, Child/Family, Medical Record  Patient Orientation: Alert and Oriented, Person, Place, Situation, Self    Patient Cognition: Alert    Hospitalization in the last 30 days (Readmission):  No    If yes, Readmission Assessment in  Navigator will be completed.    Advance Directives:      Code Status: Not on file   Patient's Primary Decision Maker is: Legal Next of Kin (DTR WILL BRING IN POA DOCUMENT)      Discharge Planning:    Patient lives with:   Type of Home: Assisted living  Primary Care Giver: Other (Comment)  Patient Support Systems include: Children, Other (Comment) (ASSISTED LIVING RESIDENTS)   Current Financial resources: Medicare  Current community resources: Assisted Living  Current services prior to admission:  NONE            Current DME:  WALKER            Type of Home Care services:   NONE    ADLS  Prior functional level: Assistance with the

## 2024-01-29 NOTE — ED NOTES
Patient found with IV dislodged by her robe.  Asked patient to remove robe and utilize the hospital gown.  Patient declined.  Assisted patient to bedside commode.  Stable with movement.  Inserted 22G to Left forearm x2 attempts.  Patient tolerated well.

## 2024-01-29 NOTE — ACP (ADVANCE CARE PLANNING)
Advance Care Planning   Healthcare Decision Maker:    Primary Decision Maker: Natty Jaime - Child - [phone]    Secondary Decision Maker: TONIA LYON - Child - [phone]    Click here to complete Healthcare Decision Makers including selection of the Healthcare Decision Maker Relationship (ie \"Primary\").  Today we documented Decision Maker(s) consistent with Legal Next of Kin hierarchy.       Pt's dtr. Natty reports she and her sister Tonia are POA, pt confirms, she will bring in copy.    Electronically signed by Fabby Jones, RN, BSN on 1/29/2024 at 3:18 PM

## 2024-01-29 NOTE — ED PROVIDER NOTES
management.  Decision regarding hospitalization.            REASSESSMENT          CRITICAL CARE TIME   Total Critical Care time was 0 minutes, excluding separately reportable procedures.  There was a high probability of clinically significant/life threatening deterioration in the patient's condition which required my urgent intervention.      CONSULTS:  None    PROCEDURES:  Unless otherwise noted below, none     Procedures        FINAL IMPRESSION      1. Congestive heart failure, unspecified HF chronicity, unspecified heart failure type (HCC)          DISPOSITION/PLAN   DISPOSITION Admitted 01/29/2024 02:13:52 PM      PATIENT REFERRED TO:  No follow-up provider specified.    DISCHARGE MEDICATIONS:  New Prescriptions    No medications on file     Controlled Substances Monitoring:          No data to display                (Please note that portions of this note were completed with a voice recognition program.  Efforts were made to edit the dictations but occasionally words are mis-transcribed.)    Nic Anthony MD (electronically signed)  Attending Emergency Physician        Nic Anthony MD  01/29/24 8902

## 2024-01-29 NOTE — H&P
Hospital Medicine  History and Physical    Patient:  Frances Meek  MRN: [id number]    CHIEF COMPLAINT:    Chief Complaint   Patient presents with    Shortness of Breath     Pt presents to ED due to shortness of breath on exertion pt from [facility]        History Obtained From:  Patient, EMR  Primary Care Physician: Braydon Francis MD    HISTORY OF PRESENT ILLNESS:   The patient is a 89 y.o. female with PMH of AFIB, HTN, chronic diastolic HF, CKD3,  obesity, anxiety who presented with shortness of breath , ECG on arrival showed AFIB with ST-T wave changes similar to prior ECGs, CXR showed vascular congestion and left pleural effusion, BNP was elevated, IV lasix given, admitted for further management.    Past Medical History:      Diagnosis Date    Atrial fibrillation (HCC)        Past Surgical History:  History reviewed. No pertinent surgical history.    Medications Prior to Admission:    Prior to Admission medications    Medication Sig Start Date End Date Taking? Authorizing Provider   clonazePAM (KLONOPIN) 0.5 MG tablet Take 1 tabs po qhs routine and  may take (1) additional tab daily prn 1/10/24 4/9/24  Mark Serrano PA   clonazePAM (KLONOPIN) 0.5 MG tablet Take 1 tablet by mouth daily as needed for Anxiety (Q24H PRN) for up to 90 days. Max Daily Amount: 0.5 mg 11/7/23 2/5/24  Mark Serrano PA       Allergies:  Ace inhibitors, Albuterol sulfate, Amlodipine, Aspirin, Carvedilol, Cephalexin, Cholecalciferol, Clonidine, Codeine, Estrogens, Hydrochlorothiazide, Ibuprofen, Nsaids, Quinolones, Sulfa antibiotics, Tetracyclines & related, Thiazide-type diuretics, and Esomeprazole magnesium    Social History:   TOBACCO:   reports that she has never smoked. She has never been exposed to tobacco smoke. She has never used smokeless tobacco.  ETOH:   reports that she does not currently use alcohol.      Family History:   History reviewed. No pertinent family history.    REVIEW OF SYSTEMS:  Ten systems reviewed and negative

## 2024-01-29 NOTE — CONSULTS
Full consult to follow  
There is moderate left ventricular hypertrophy.   Left ventricular systolic function is hyperdynamic. EF = 75 ± 5% (visual est.)   Left ventricular diastolic function was not evaluated due to AF.   - The right ventricle is normal in size. Right ventricular systolic function is   normal.   - The left atrial cavity is severely dilated.   - The right atrial cavity is dilated.   - Estimated right ventricular systolic pressure is 46 mmHg consistent with mild   pulmonary hypertension. Estimated right atrial pressure is 8 mmHg based on IVC   assessment.   - No significant valvular abnormalities.   - Mild MR, TR, and MS   - Trace AI   No results found for this or any previous visit.      CXR:  1. Cardiomegaly with mild pulmonary vascular congestion.  2. Small left pleural effusion versus pleural thickening.       Holter monitor 2020 at [facility]:    IMPRESSIONS AND FINDINGS:     Atrial Fibrillation with 8% rapid ventricular response and episodes of slow   ventricular response.   Max  bpm, Min HR 53, Average 75 bpm;   Rare VE's present in singles, a couplets, and a bigeminal cycle.   2 pauses present, longest 2.08s at 1:24p on 9/22/20.   Patient event marker was not activated with no noted symptoms on diary.        Assessment/Impression:   Acute on chronic HFpEF. Rule out new LV dysfunction.   Atrial fibrillation, likely chronic, rate controlled.   Hypertension with hypertensive heart disease, poorly controlled.   Anxiety   . Principal Problem:    Acute diastolic (congestive) heart failure (HCC)  Resolved Problems:    * No resolved hospital problems. *      Recommendations:   IV diuresis   Strict I/Os and daily weights  Fluid restrictions  Echocardiogram for updated LV function.   BP control. Resume Cardizem as short acting 60 mg Q 6hours. Add PRN hydralazine.   Resume metoprolol 50 mg BID  Continue Eliquis.   Further recommendations to follow pending her clinical course.     Thank you very much for allowing me to

## 2024-01-30 ENCOUNTER — APPOINTMENT (OUTPATIENT)
Age: 89
DRG: 291 | End: 2024-01-30
Attending: INTERNAL MEDICINE
Payer: MEDICARE

## 2024-01-30 LAB
ALBUMIN SERPL-MCNC: 4 G/DL (ref 3.5–4.6)
ANION GAP SERPL CALCULATED.3IONS-SCNC: 12 MEQ/L (ref 9–15)
BUN SERPL-MCNC: 20 MG/DL (ref 8–23)
CALCIUM SERPL-MCNC: 8.9 MG/DL (ref 8.5–9.9)
CHLORIDE SERPL-SCNC: 103 MEQ/L (ref 95–107)
CHOLEST SERPL-MCNC: 179 MG/DL (ref 0–199)
CO2 SERPL-SCNC: 27 MEQ/L (ref 20–31)
CREAT SERPL-MCNC: 1.11 MG/DL (ref 0.5–0.9)
ECHO AO ROOT DIAM: 2.4 CM
ECHO AO ROOT INDEX: 1.3 CM/M2
ECHO AV AREA PEAK VELOCITY: 1.2 CM2
ECHO AV AREA VTI: 1.4 CM2
ECHO AV AREA/BSA PEAK VELOCITY: 0.6 CM2/M2
ECHO AV AREA/BSA VTI: 0.8 CM2/M2
ECHO AV CUSP MM: 0.8 CM
ECHO AV MEAN GRADIENT: 8 MMHG
ECHO AV MEAN VELOCITY: 1.3 M/S
ECHO AV PEAK GRADIENT: 14 MMHG
ECHO AV PEAK VELOCITY: 2 M/S
ECHO AV VELOCITY RATIO: 0.55
ECHO AV VTI: 39.5 CM
ECHO BSA: 1.9 M2
ECHO EST RA PRESSURE: 3 MMHG
ECHO LA DIAMETER INDEX: 2.86 CM/M2
ECHO LA DIAMETER: 5.3 CM
ECHO LA TO AORTIC ROOT RATIO: 2.21
ECHO LA VOL A-L A2C: 125 ML (ref 22–52)
ECHO LA VOL A-L A4C: 99 ML (ref 22–52)
ECHO LA VOL MOD A2C: 121 ML (ref 22–52)
ECHO LA VOL MOD A4C: 92 ML (ref 22–52)
ECHO LA VOLUME AREA LENGTH: 117 ML
ECHO LA VOLUME INDEX A-L A2C: 68 ML/M2 (ref 16–34)
ECHO LA VOLUME INDEX A-L A4C: 54 ML/M2 (ref 16–34)
ECHO LA VOLUME INDEX AREA LENGTH: 63 ML/M2 (ref 16–34)
ECHO LA VOLUME INDEX MOD A2C: 65 ML/M2 (ref 16–34)
ECHO LA VOLUME INDEX MOD A4C: 50 ML/M2 (ref 16–34)
ECHO LV E' LATERAL VELOCITY: 10 CM/S
ECHO LV FRACTIONAL SHORTENING: 27 % (ref 28–44)
ECHO LV INTERNAL DIMENSION DIASTOLE INDEX: 2.43 CM/M2
ECHO LV INTERNAL DIMENSION DIASTOLIC: 4.5 CM (ref 3.9–5.3)
ECHO LV INTERNAL DIMENSION SYSTOLIC INDEX: 1.78 CM/M2
ECHO LV INTERNAL DIMENSION SYSTOLIC: 3.3 CM
ECHO LV IVSD: 1.8 CM (ref 0.6–0.9)
ECHO LV IVSS: 2.1 CM
ECHO LV MASS 2D: 319.6 G (ref 67–162)
ECHO LV MASS INDEX 2D: 172.8 G/M2 (ref 43–95)
ECHO LV POSTERIOR WALL DIASTOLIC: 1.5 CM (ref 0.6–0.9)
ECHO LV POSTERIOR WALL SYSTOLIC: 1.8 CM
ECHO LV RELATIVE WALL THICKNESS RATIO: 0.67
ECHO LVOT AREA: 2.3 CM2
ECHO LVOT AV VTI INDEX: 0.59
ECHO LVOT DIAM: 1.7 CM
ECHO LVOT MEAN GRADIENT: 2 MMHG
ECHO LVOT PEAK GRADIENT: 4 MMHG
ECHO LVOT PEAK VELOCITY: 1.1 M/S
ECHO LVOT STROKE VOLUME INDEX: 28.5 ML/M2
ECHO LVOT SV: 52.6 ML
ECHO LVOT VTI: 23.2 CM
ECHO MV REGURGITANT PEAK GRADIENT: 164 MMHG
ECHO MV REGURGITANT PEAK VELOCITY: 6.4 M/S
ECHO PV MAX VELOCITY: 0.6 M/S
ECHO PV PEAK GRADIENT: 1 MMHG
ECHO RIGHT VENTRICULAR SYSTOLIC PRESSURE (RVSP): 63 MMHG
ECHO RV TAPSE: 1.3 CM (ref 1.7–?)
ECHO RVOT PEAK GRADIENT: 1 MMHG
ECHO RVOT PEAK VELOCITY: 0.5 M/S
ECHO TV REGURGITANT MAX VELOCITY: 3.86 M/S
ECHO TV REGURGITANT PEAK GRADIENT: 60 MMHG
EKG ATRIAL RATE: 300 BPM
EKG Q-T INTERVAL: 426 MS
EKG QRS DURATION: 84 MS
EKG QTC CALCULATION (BAZETT): 472 MS
EKG R AXIS: -5 DEGREES
EKG T AXIS: 141 DEGREES
EKG VENTRICULAR RATE: 74 BPM
ERYTHROCYTE [DISTWIDTH] IN BLOOD BY AUTOMATED COUNT: 12.9 % (ref 11.5–14.5)
GLUCOSE SERPL-MCNC: 93 MG/DL (ref 70–99)
HCT VFR BLD AUTO: 36.5 % (ref 37–47)
HDLC SERPL-MCNC: 39 MG/DL (ref 40–59)
HGB BLD-MCNC: 12.6 G/DL (ref 12–16)
LDLC SERPL CALC-MCNC: 112 MG/DL (ref 0–129)
MAGNESIUM SERPL-MCNC: 2.2 MG/DL (ref 1.7–2.4)
MCH RBC QN AUTO: 33.2 PG (ref 27–31.3)
MCHC RBC AUTO-ENTMCNC: 34.5 % (ref 33–37)
MCV RBC AUTO: 96.1 FL (ref 79.4–94.8)
PHOSPHATE SERPL-MCNC: 3.5 MG/DL (ref 2.3–4.8)
PLATELET # BLD AUTO: 136 K/UL (ref 130–400)
POTASSIUM SERPL-SCNC: 3.7 MEQ/L (ref 3.4–4.9)
RBC # BLD AUTO: 3.8 M/UL (ref 4.2–5.4)
SODIUM SERPL-SCNC: 142 MEQ/L (ref 135–144)
TRIGL SERPL-MCNC: 141 MG/DL (ref 0–150)
WBC # BLD AUTO: 6 K/UL (ref 4.8–10.8)

## 2024-01-30 PROCEDURE — 93306 TTE W/DOPPLER COMPLETE: CPT | Performed by: INTERNAL MEDICINE

## 2024-01-30 PROCEDURE — 99232 SBSQ HOSP IP/OBS MODERATE 35: CPT | Performed by: PHYSICIAN ASSISTANT

## 2024-01-30 PROCEDURE — 2580000003 HC RX 258: Performed by: INTERNAL MEDICINE

## 2024-01-30 PROCEDURE — 36415 COLL VENOUS BLD VENIPUNCTURE: CPT

## 2024-01-30 PROCEDURE — 97162 PT EVAL MOD COMPLEX 30 MIN: CPT

## 2024-01-30 PROCEDURE — 2060000000 HC ICU INTERMEDIATE R&B

## 2024-01-30 PROCEDURE — 6370000000 HC RX 637 (ALT 250 FOR IP): Performed by: PHYSICIAN ASSISTANT

## 2024-01-30 PROCEDURE — 85027 COMPLETE CBC AUTOMATED: CPT

## 2024-01-30 PROCEDURE — 97166 OT EVAL MOD COMPLEX 45 MIN: CPT

## 2024-01-30 PROCEDURE — C8929 TTE W OR WO FOL WCON,DOPPLER: HCPCS

## 2024-01-30 PROCEDURE — 6370000000 HC RX 637 (ALT 250 FOR IP): Performed by: INTERNAL MEDICINE

## 2024-01-30 PROCEDURE — 80069 RENAL FUNCTION PANEL: CPT

## 2024-01-30 PROCEDURE — 80061 LIPID PANEL: CPT

## 2024-01-30 PROCEDURE — 83735 ASSAY OF MAGNESIUM: CPT

## 2024-01-30 PROCEDURE — 6360000002 HC RX W HCPCS: Performed by: INTERNAL MEDICINE

## 2024-01-30 PROCEDURE — 6360000004 HC RX CONTRAST MEDICATION: Performed by: INTERNAL MEDICINE

## 2024-01-30 RX ORDER — FUROSEMIDE 20 MG/1
20 TABLET ORAL DAILY
Status: DISCONTINUED | OUTPATIENT
Start: 2024-01-31 | End: 2024-02-01 | Stop reason: HOSPADM

## 2024-01-30 RX ORDER — HYDRALAZINE HYDROCHLORIDE 25 MG/1
25 TABLET, FILM COATED ORAL EVERY 8 HOURS SCHEDULED
Status: DISCONTINUED | OUTPATIENT
Start: 2024-01-30 | End: 2024-01-31

## 2024-01-30 RX ADMIN — ACETAMINOPHEN 650 MG: 325 TABLET ORAL at 07:08

## 2024-01-30 RX ADMIN — DILTIAZEM HYDROCHLORIDE 60 MG: 60 TABLET, FILM COATED ORAL at 00:00

## 2024-01-30 RX ADMIN — Medication 10 ML: at 21:51

## 2024-01-30 RX ADMIN — APIXABAN 5 MG: 5 TABLET, FILM COATED ORAL at 07:57

## 2024-01-30 RX ADMIN — CLONAZEPAM 0.5 MG: 0.5 TABLET ORAL at 21:38

## 2024-01-30 RX ADMIN — PERFLUTREN 1.5 ML: 6.52 INJECTION, SUSPENSION INTRAVENOUS at 14:00

## 2024-01-30 RX ADMIN — METOPROLOL TARTRATE 50 MG: 50 TABLET ORAL at 07:57

## 2024-01-30 RX ADMIN — APIXABAN 5 MG: 5 TABLET, FILM COATED ORAL at 21:38

## 2024-01-30 RX ADMIN — HYDRALAZINE HYDROCHLORIDE 25 MG: 25 TABLET ORAL at 21:38

## 2024-01-30 RX ADMIN — Medication 5 ML: at 07:57

## 2024-01-30 RX ADMIN — DILTIAZEM HYDROCHLORIDE 60 MG: 60 TABLET, FILM COATED ORAL at 11:54

## 2024-01-30 RX ADMIN — METOPROLOL TARTRATE 50 MG: 50 TABLET ORAL at 21:38

## 2024-01-30 RX ADMIN — HYDRALAZINE HYDROCHLORIDE 25 MG: 25 TABLET ORAL at 15:43

## 2024-01-30 RX ADMIN — POTASSIUM CHLORIDE 20 MEQ: 1500 TABLET, EXTENDED RELEASE ORAL at 07:57

## 2024-01-30 RX ADMIN — DILTIAZEM HYDROCHLORIDE 60 MG: 60 TABLET, FILM COATED ORAL at 06:07

## 2024-01-30 RX ADMIN — ACETAMINOPHEN 650 MG: 325 TABLET ORAL at 15:43

## 2024-01-30 RX ADMIN — FUROSEMIDE 20 MG: 10 INJECTION, SOLUTION INTRAMUSCULAR; INTRAVENOUS at 07:57

## 2024-01-30 RX ADMIN — DILTIAZEM HYDROCHLORIDE 60 MG: 60 TABLET, FILM COATED ORAL at 17:47

## 2024-01-30 ASSESSMENT — PAIN DESCRIPTION - ORIENTATION: ORIENTATION: MID

## 2024-01-30 ASSESSMENT — ENCOUNTER SYMPTOMS
SHORTNESS OF BREATH: 0
COLOR CHANGE: 0
DIARRHEA: 0
NAUSEA: 0
ABDOMINAL PAIN: 1
CONSTIPATION: 0
CHEST TIGHTNESS: 0

## 2024-01-30 ASSESSMENT — PAIN SCALES - GENERAL
PAINLEVEL_OUTOF10: 4
PAINLEVEL_OUTOF10: 0

## 2024-01-30 ASSESSMENT — PAIN DESCRIPTION - DESCRIPTORS: DESCRIPTORS: ACHING

## 2024-01-30 ASSESSMENT — PAIN DESCRIPTION - LOCATION: LOCATION: BACK

## 2024-01-30 NOTE — CARE COORDINATION
SPOKE WITH [facility],  UPDATE GIVEN.  CONFIRMED PT USES A WALKER TO AMBULATE, NO O2.  PT DID JUST START THEIR THERAPY PROGRAM, PER AMELIE NO NEED FOR RESUME ORDER.  WILL UPDATE AS NEEDED.

## 2024-01-30 NOTE — PLAN OF CARE
Problem: Discharge Planning  Goal: Discharge to home or other facility with appropriate resources  Outcome: Progressing     Problem: Safety - Adult  Goal: Free from fall injury  Outcome: Progressing   Discussed patient remaining safe during transfer.

## 2024-01-30 NOTE — CARE COORDINATION
Definition of CHF discussed with patient.   Symptoms of heart failure and decompensation reviewed: weight gain of >3 #, edema, difficulty breathing, cough, issues with appetite, fatigue, or difficulty with sleep.   Common causes of CHF reviewed: CAD, arrhythmias, MI, HTN, valve dz., infection,  ETOH or drug abuse, or genetic abnormalities.  Importance of daily weight and B/P monitoring discussed. Pt to use a calender or notebook to record daily weight and call physician immediately with 3 # weight gain.  Low sodium diet and fluid intake discussed. Pt taught about a fluid restriction and advised to discuss this with the cardiologist prior to limiting oral intake.  Shown how to read labels for sodium levels, recommended food list provided. She lives at assisted living, and is unsure if she can get a low sodium meal. I encouraged family assistance with meal prep if she is unable to get a low sodium meal.   I emphasized the importance of following their physician's orders for medication administration. Patient states that she uses home health instead of a PCP because she didn't want a lot of physician appointments. Pt informed that home health is not a substitute for a physician.   Importance of flu and pneumonia vaccinations reinforced.   Common CHF medications reviewed as well as avoiding certain other meds (decongestants, NSAIDS)  Instructed to discuss activity recommendations with physician.  Sample CHF weight documentation form provided.   CHF Zones discussed. Importance of staying in \"green\" area stressed. Pt verbalized understanding to call MD ASAP when she reaches the yellow zone, and to call 911 when reaching the red zone.   Booklet and zone pamphlet provided to the pt.   Patient denies any further questions at this time.   Electronically signed by Charo Miles RN on 1/30/2024 at 11:24 AM

## 2024-01-30 NOTE — PROGRESS NOTES
Subjective:      Patient ID: Frances Meek is a pleasant 89 y.o. female who presents today for:  No chief complaint on file.      [facility]  Patient seen today due to concern over possible increase in MDD symptoms.Patient has been on sertraline 175 mg p.o. daily since before arrival to facility and has been on long-term.  States that since her most recent COVID lockdown measures when she was battling with COVID, she had increased depression, making some statements akin to \"why am I even here?\"  Though she does not express explicit suicidal thoughts or plans.  States that she is having some trouble with sleep \"falling asleep but not staying asleep.  \"Or unrestful sleep \".  Did discuss her long-term Klonopin use, which she takes a dose before bedtime for sleep anxiety.  Did discuss weaning off some of the Klonopin, and trying trazodone low-dose in its place.  Daughter is in the room with her today discussing, and they are both in agreement attempted this.  Will plan on starting either tonight or tomorrow evening and monitoring progress.    Patient Active Problem List   Diagnosis    Choroidal nevus of right eye    CKD (chronic kidney disease) stage 3, GFR 30-59 ml/min (HCC)    Diverticulosis    Esophageal reflux    Essential hypertension    AMANDA (generalized anxiety disorder)    HLD (hyperlipidemia)    Mild episode of recurrent major depressive disorder (HCC)    Nonintractable headache    Obesity, Class I, BMI 30-34.9    Primary osteoarthritis of right knee    Paroxysmal atrial fibrillation (HCC)    Psychophysiological insomnia    Vitamin D deficiency    Chronic combined systolic and diastolic heart failure (HCC)    Hypertensive heart disease with heart failure (HCC)    Long term (current) use of anticoagulants    Thrombocytopenia, unspecified (HCC)    Acute diastolic (congestive) heart failure (HCC)     Past Medical History:   Diagnosis Date    Atrial fibrillation (HCC)      No past surgical

## 2024-01-31 PROBLEM — I38 VALVULAR HEART DISEASE: Status: ACTIVE | Noted: 2024-01-31

## 2024-01-31 LAB
ALBUMIN SERPL-MCNC: 4.1 G/DL (ref 3.5–4.6)
ANION GAP SERPL CALCULATED.3IONS-SCNC: 13 MEQ/L (ref 9–15)
BNP BLD-MCNC: 7763 PG/ML
BUN SERPL-MCNC: 19 MG/DL (ref 8–23)
CALCIUM SERPL-MCNC: 9.3 MG/DL (ref 8.5–9.9)
CHLORIDE SERPL-SCNC: 103 MEQ/L (ref 95–107)
CO2 SERPL-SCNC: 26 MEQ/L (ref 20–31)
CREAT SERPL-MCNC: 1.03 MG/DL (ref 0.5–0.9)
ERYTHROCYTE [DISTWIDTH] IN BLOOD BY AUTOMATED COUNT: 13 % (ref 11.5–14.5)
GLUCOSE SERPL-MCNC: 96 MG/DL (ref 70–99)
HCT VFR BLD AUTO: 37.2 % (ref 37–47)
HGB BLD-MCNC: 13.2 G/DL (ref 12–16)
MAGNESIUM SERPL-MCNC: 2.2 MG/DL (ref 1.7–2.4)
MCH RBC QN AUTO: 33.8 PG (ref 27–31.3)
MCHC RBC AUTO-ENTMCNC: 35.5 % (ref 33–37)
MCV RBC AUTO: 95.4 FL (ref 79.4–94.8)
PHOSPHATE SERPL-MCNC: 3.5 MG/DL (ref 2.3–4.8)
PLATELET # BLD AUTO: 134 K/UL (ref 130–400)
POTASSIUM SERPL-SCNC: 3.7 MEQ/L (ref 3.4–4.9)
RBC # BLD AUTO: 3.9 M/UL (ref 4.2–5.4)
SODIUM SERPL-SCNC: 142 MEQ/L (ref 135–144)
WBC # BLD AUTO: 5.4 K/UL (ref 4.8–10.8)

## 2024-01-31 PROCEDURE — 99232 SBSQ HOSP IP/OBS MODERATE 35: CPT | Performed by: INTERNAL MEDICINE

## 2024-01-31 PROCEDURE — 85027 COMPLETE CBC AUTOMATED: CPT

## 2024-01-31 PROCEDURE — 6370000000 HC RX 637 (ALT 250 FOR IP): Performed by: PHYSICIAN ASSISTANT

## 2024-01-31 PROCEDURE — 2060000000 HC ICU INTERMEDIATE R&B

## 2024-01-31 PROCEDURE — 36415 COLL VENOUS BLD VENIPUNCTURE: CPT

## 2024-01-31 PROCEDURE — 6370000000 HC RX 637 (ALT 250 FOR IP): Performed by: INTERNAL MEDICINE

## 2024-01-31 PROCEDURE — 80069 RENAL FUNCTION PANEL: CPT

## 2024-01-31 PROCEDURE — 83880 ASSAY OF NATRIURETIC PEPTIDE: CPT

## 2024-01-31 PROCEDURE — 83735 ASSAY OF MAGNESIUM: CPT

## 2024-01-31 PROCEDURE — 2580000003 HC RX 258: Performed by: INTERNAL MEDICINE

## 2024-01-31 RX ORDER — LOSARTAN POTASSIUM 25 MG/1
25 TABLET ORAL DAILY
Status: DISCONTINUED | OUTPATIENT
Start: 2024-01-31 | End: 2024-02-01 | Stop reason: HOSPADM

## 2024-01-31 RX ORDER — METOPROLOL TARTRATE 50 MG/1
100 TABLET, FILM COATED ORAL 2 TIMES DAILY
Status: DISCONTINUED | OUTPATIENT
Start: 2024-01-31 | End: 2024-02-01 | Stop reason: HOSPADM

## 2024-01-31 RX ADMIN — APIXABAN 5 MG: 5 TABLET, FILM COATED ORAL at 22:20

## 2024-01-31 RX ADMIN — FUROSEMIDE 20 MG: 20 TABLET ORAL at 09:44

## 2024-01-31 RX ADMIN — ACETAMINOPHEN 650 MG: 325 TABLET ORAL at 05:24

## 2024-01-31 RX ADMIN — METOPROLOL TARTRATE 50 MG: 50 TABLET ORAL at 09:45

## 2024-01-31 RX ADMIN — HYDRALAZINE HYDROCHLORIDE 25 MG: 25 TABLET ORAL at 13:20

## 2024-01-31 RX ADMIN — Medication 10 ML: at 09:45

## 2024-01-31 RX ADMIN — DILTIAZEM HYDROCHLORIDE 60 MG: 60 TABLET, FILM COATED ORAL at 05:07

## 2024-01-31 RX ADMIN — DILTIAZEM HYDROCHLORIDE 60 MG: 60 TABLET, FILM COATED ORAL at 13:20

## 2024-01-31 RX ADMIN — APIXABAN 5 MG: 5 TABLET, FILM COATED ORAL at 09:44

## 2024-01-31 RX ADMIN — DILTIAZEM HYDROCHLORIDE 60 MG: 60 TABLET, FILM COATED ORAL at 17:45

## 2024-01-31 RX ADMIN — METOPROLOL TARTRATE 100 MG: 50 TABLET ORAL at 22:21

## 2024-01-31 RX ADMIN — POTASSIUM CHLORIDE 20 MEQ: 1500 TABLET, EXTENDED RELEASE ORAL at 09:44

## 2024-01-31 RX ADMIN — HYDRALAZINE HYDROCHLORIDE 25 MG: 25 TABLET ORAL at 05:08

## 2024-01-31 RX ADMIN — LOSARTAN POTASSIUM 25 MG: 25 TABLET, FILM COATED ORAL at 22:24

## 2024-01-31 RX ADMIN — Medication 10 ML: at 22:24

## 2024-01-31 RX ADMIN — CLONAZEPAM 0.5 MG: 0.5 TABLET ORAL at 22:21

## 2024-01-31 ASSESSMENT — PAIN DESCRIPTION - LOCATION: LOCATION: ABDOMEN;GROIN

## 2024-01-31 ASSESSMENT — PAIN SCALES - GENERAL: PAINLEVEL_OUTOF10: 6

## 2024-02-01 VITALS
BODY MASS INDEX: 31.29 KG/M2 | HEART RATE: 64 BPM | WEIGHT: 176.6 LBS | RESPIRATION RATE: 16 BRPM | TEMPERATURE: 97.2 F | HEIGHT: 63 IN | SYSTOLIC BLOOD PRESSURE: 153 MMHG | OXYGEN SATURATION: 95 % | DIASTOLIC BLOOD PRESSURE: 79 MMHG

## 2024-02-01 LAB
ALBUMIN SERPL-MCNC: 4 G/DL (ref 3.5–4.6)
ANION GAP SERPL CALCULATED.3IONS-SCNC: 10 MEQ/L (ref 9–15)
BNP BLD-MCNC: 7721 PG/ML
BUN SERPL-MCNC: 18 MG/DL (ref 8–23)
CALCIUM SERPL-MCNC: 9.1 MG/DL (ref 8.5–9.9)
CHLORIDE SERPL-SCNC: 102 MEQ/L (ref 95–107)
CO2 SERPL-SCNC: 27 MEQ/L (ref 20–31)
CREAT SERPL-MCNC: 1.06 MG/DL (ref 0.5–0.9)
ERYTHROCYTE [DISTWIDTH] IN BLOOD BY AUTOMATED COUNT: 13.1 % (ref 11.5–14.5)
GLUCOSE SERPL-MCNC: 97 MG/DL (ref 70–99)
HCT VFR BLD AUTO: 39.8 % (ref 37–47)
HGB BLD-MCNC: 13.3 G/DL (ref 12–16)
MAGNESIUM SERPL-MCNC: 2.2 MG/DL (ref 1.7–2.4)
MCH RBC QN AUTO: 32.5 PG (ref 27–31.3)
MCHC RBC AUTO-ENTMCNC: 33.4 % (ref 33–37)
MCV RBC AUTO: 97.3 FL (ref 79.4–94.8)
PHOSPHATE SERPL-MCNC: 3.7 MG/DL (ref 2.3–4.8)
PLATELET # BLD AUTO: 149 K/UL (ref 130–400)
POTASSIUM SERPL-SCNC: 3.6 MEQ/L (ref 3.4–4.9)
RBC # BLD AUTO: 4.09 M/UL (ref 4.2–5.4)
SODIUM SERPL-SCNC: 139 MEQ/L (ref 135–144)
WBC # BLD AUTO: 5.8 K/UL (ref 4.8–10.8)

## 2024-02-01 PROCEDURE — 2580000003 HC RX 258: Performed by: INTERNAL MEDICINE

## 2024-02-01 PROCEDURE — 83735 ASSAY OF MAGNESIUM: CPT

## 2024-02-01 PROCEDURE — 6370000000 HC RX 637 (ALT 250 FOR IP): Performed by: PHYSICIAN ASSISTANT

## 2024-02-01 PROCEDURE — 99232 SBSQ HOSP IP/OBS MODERATE 35: CPT | Performed by: INTERNAL MEDICINE

## 2024-02-01 PROCEDURE — 6370000000 HC RX 637 (ALT 250 FOR IP): Performed by: INTERNAL MEDICINE

## 2024-02-01 PROCEDURE — 36415 COLL VENOUS BLD VENIPUNCTURE: CPT

## 2024-02-01 PROCEDURE — 85027 COMPLETE CBC AUTOMATED: CPT

## 2024-02-01 PROCEDURE — 80069 RENAL FUNCTION PANEL: CPT

## 2024-02-01 PROCEDURE — 83880 ASSAY OF NATRIURETIC PEPTIDE: CPT

## 2024-02-01 RX ORDER — LOSARTAN POTASSIUM 25 MG/1
25 TABLET ORAL DAILY
Qty: 30 TABLET | Refills: 3 | Status: SHIPPED | OUTPATIENT
Start: 2024-02-02

## 2024-02-01 RX ORDER — DILTIAZEM HYDROCHLORIDE 240 MG/1
240 CAPSULE, COATED, EXTENDED RELEASE ORAL DAILY
Qty: 30 CAPSULE | Refills: 3 | Status: SHIPPED | OUTPATIENT
Start: 2024-02-01

## 2024-02-01 RX ORDER — DILTIAZEM HYDROCHLORIDE 240 MG/1
240 CAPSULE, COATED, EXTENDED RELEASE ORAL DAILY
Status: DISCONTINUED | OUTPATIENT
Start: 2024-02-01 | End: 2024-02-01 | Stop reason: HOSPADM

## 2024-02-01 RX ORDER — METOPROLOL TARTRATE 100 MG/1
100 TABLET ORAL 2 TIMES DAILY
Qty: 60 TABLET | Refills: 3 | Status: SHIPPED | OUTPATIENT
Start: 2024-02-01

## 2024-02-01 RX ADMIN — FUROSEMIDE 20 MG: 20 TABLET ORAL at 08:11

## 2024-02-01 RX ADMIN — DILTIAZEM HYDROCHLORIDE 60 MG: 60 TABLET, FILM COATED ORAL at 06:07

## 2024-02-01 RX ADMIN — DILTIAZEM HYDROCHLORIDE 240 MG: 240 CAPSULE, EXTENDED RELEASE ORAL at 12:36

## 2024-02-01 RX ADMIN — LOSARTAN POTASSIUM 25 MG: 25 TABLET, FILM COATED ORAL at 08:11

## 2024-02-01 RX ADMIN — CLONAZEPAM 0.5 MG: 0.5 TABLET ORAL at 08:18

## 2024-02-01 RX ADMIN — ACETAMINOPHEN 650 MG: 325 TABLET ORAL at 08:11

## 2024-02-01 RX ADMIN — APIXABAN 5 MG: 5 TABLET, FILM COATED ORAL at 08:11

## 2024-02-01 RX ADMIN — Medication 5 ML: at 08:11

## 2024-02-01 RX ADMIN — POTASSIUM CHLORIDE 20 MEQ: 1500 TABLET, EXTENDED RELEASE ORAL at 08:11

## 2024-02-01 RX ADMIN — DILTIAZEM HYDROCHLORIDE 60 MG: 60 TABLET, FILM COATED ORAL at 00:30

## 2024-02-01 RX ADMIN — METOPROLOL TARTRATE 100 MG: 50 TABLET ORAL at 08:11

## 2024-02-01 ASSESSMENT — PAIN SCALES - GENERAL: PAINLEVEL_OUTOF10: 5

## 2024-02-01 ASSESSMENT — PAIN DESCRIPTION - LOCATION: LOCATION: ABDOMEN

## 2024-02-01 ASSESSMENT — PAIN DESCRIPTION - DESCRIPTORS: DESCRIPTORS: DISCOMFORT

## 2024-02-01 ASSESSMENT — PAIN - FUNCTIONAL ASSESSMENT: PAIN_FUNCTIONAL_ASSESSMENT: ACTIVITIES ARE NOT PREVENTED

## 2024-02-01 ASSESSMENT — PAIN DESCRIPTION - ORIENTATION: ORIENTATION: LOWER;RIGHT;LEFT

## 2024-02-01 NOTE — CARE COORDINATION
[facility].  RN AWARE PT WILL NEED D/C'D BY 5PM.  PER CONVERSATION WITH [facility], PT IS PART OF THEIR PHYSICAL THERAPY PROGRAM.

## 2024-02-01 NOTE — DISCHARGE INSTR - DIET
Good nutrition is important when healing from an illness, injury, or surgery.  Follow any nutrition recommendations given to you during your hospital stay.   If you were given an oral nutrition supplement while in the hospital, continue to take this supplement at home.  You can take it with meals, in-between meals, and/or before bedtime. These supplements can be purchased at most local grocery stores, pharmacies, and chain [employer].   If you have any questions about your diet or nutrition, call the hospital and ask for the dietitian.    Heart healthy, low sodium diet. See attached information on heart failure zones.

## 2024-02-01 NOTE — PROGRESS NOTES
Cardiology Follow up Note    Subjective:  1/31/24: Feels weak but no SOB or chest pain. No palpitations. Telemetry shows Afib, rate controlled. SBP still running 150-160s    1/30/24: Resting comfortably in bed in no acute distress.  Denies shortness of breath at rest.  States that she has been having shortness of breath with more prolonged exertion or activity.  Denies chest pain or palpitations.  She complains of some lower abdominal discomfort and cramping which she feels is related to her Lasix.  BP uncontrolled on serial vitals most recently at 182/67.  Remains on Lasix 20 mg IV twice daily.  A.m. labs reviewed.  Renal function electrolytes stable compared to prior.  Echocardiogram completed this morning showing normal LV systolic function with EF of 50 to 55%, mild aortic regurgitation, mild to moderate aortic stenosis, moderate mitral valve regurgitation, mild to moderate TR with RVSP elevated at 63 mmHg.        1/29/24: Frances Meek is a 89 y.o. female with persistent Afib, hypertension, and anxiety admitted with shortness of breath over the last few days. She reports dyspnea with more physical degrees of activity. No chest pain. No palpitations, near syncope or syncope. She reports leg swelling but no othopnea or PND. No overt anginal chest pain. She was hypoxic at nursing home with SpO2 88%. She has not seen cardiologist in some time. BP has been running high for quite some time. SBP running 160-190s since presentation. She reports compliance with medications including Eliquis. Daughter at bedside.       Review of Systems:   As noted in HPI    Objective:  BP (!) 155/83   Pulse 76   Temp 98 °F (36.7 °C) (Oral)   Resp 16   Ht 1.6 m (5' 2.99\")   Wt 81.5 kg (179 lb 10.8 oz)   LMP  (LMP Unknown)   SpO2 94%   BMI 31.84 kg/m²   Constitutional: alert, cooperative, in no distress  Eyes: Conjunctiva clear; no scleral icturus. PERRLA   Respiratory: clear to auscultation bilaterally  Musculoskeletal: No 
Hospitalist Progress Note      PCP: Braydon Francis MD    Date of Admission: 1/29/2024    Chief Complaint:  no acute events, afebrile, stable HD, on RA    Medications:  Reviewed    Infusion Medications    sodium chloride       Scheduled Medications    dilTIAZem  240 mg Oral Daily    metoprolol tartrate  100 mg Oral BID    losartan  25 mg Oral Daily    furosemide  20 mg Oral Daily    sodium chloride flush  5-40 mL IntraVENous 2 times per day    clonazePAM  0.5 mg Oral Nightly    apixaban  5 mg Oral BID    potassium chloride  20 mEq Oral Daily     PRN Meds: sodium chloride flush, sodium chloride, ondansetron **OR** ondansetron, polyethylene glycol, acetaminophen **OR** acetaminophen, potassium chloride **OR** potassium alternative oral replacement **OR** potassium chloride, magnesium sulfate, clonazePAM, hydrALAZINE      Intake/Output Summary (Last 24 hours) at 2/1/2024 1210  Last data filed at 2/1/2024 0815  Gross per 24 hour   Intake 520 ml   Output --   Net 520 ml         Exam:    BP (!) 171/71   Pulse 66   Temp 97.7 °F (36.5 °C) (Oral)   Resp 14   Ht 1.6 m (5' 2.99\")   Wt 80.1 kg (176 lb 9.6 oz)   LMP  (LMP Unknown)   SpO2 95%   BMI 31.29 kg/m²     General appearance: alert, cooperative  Lungs: clear to auscultation bilaterally  Heart: S1/S2,irregular  Abdomen: soft, active BS  Extremities: edema of the LE      Labs:   Recent Labs     01/30/24  0548 01/31/24  0458 02/01/24  0456   WBC 6.0 5.4 5.8   HGB 12.6 13.2 13.3   HCT 36.5* 37.2 39.8    134 149       Recent Labs     01/30/24  0548 01/31/24  0458 02/01/24  0456    142 139   K 3.7 3.7 3.6    103 102   CO2 27 26 27   BUN 20 19 18   CREATININE 1.11* 1.03* 1.06*   CALCIUM 8.9 9.3 9.1   PHOS 3.5 3.5 3.7       Recent Labs     01/29/24  1315   AST 18   ALT 17   BILITOT 0.7   ALKPHOS 67       No results for input(s): \"INR\" in the last 72 hours.  No results for input(s): \"CKTOTAL\", \"TROPONINI\" in the last 72 hours.    Urinalysis:    No 
Hospitalist Progress Note      PCP: Braydon Francis MD    Date of Admission: 1/29/2024    Chief Complaint:  no acute events, afebrile, stable HD, on RA    Medications:  Reviewed    Infusion Medications    sodium chloride       Scheduled Medications    sodium chloride flush  5-40 mL IntraVENous 2 times per day    furosemide  20 mg IntraVENous BID    clonazePAM  0.5 mg Oral Nightly    dilTIAZem  60 mg Oral 4 times per day    metoprolol tartrate  50 mg Oral BID    apixaban  5 mg Oral BID    potassium chloride  20 mEq Oral Daily     PRN Meds: sodium chloride flush, sodium chloride, ondansetron **OR** ondansetron, polyethylene glycol, acetaminophen **OR** acetaminophen, potassium chloride **OR** potassium alternative oral replacement **OR** potassium chloride, magnesium sulfate, clonazePAM, hydrALAZINE      Intake/Output Summary (Last 24 hours) at 1/30/2024 1111  Last data filed at 1/29/2024 2329  Gross per 24 hour   Intake 450 ml   Output 500 ml   Net -50 ml       Exam:    BP (!) 154/74   Pulse 70   Temp 98.2 °F (36.8 °C) (Oral)   Resp 22   Ht 1.6 m (5' 3\")   Wt 81.5 kg (179 lb 9.6 oz)   LMP  (LMP Unknown)   SpO2 93%   BMI 31.81 kg/m²     General appearance: alert, cooperative  Lungs: clear to auscultation bilaterally  Heart: S1/S2,irregular  Abdomen: soft, active BS  Extremities: edema of the LE      Labs:   Recent Labs     01/29/24  1315 01/30/24  0548   WBC 7.0 6.0   HGB 13.4 12.6   HCT 38.7 36.5*    136     Recent Labs     01/29/24  1315 01/30/24  0548    142   K 4.1 3.7    103   CO2 27 27   BUN 17 20   CREATININE 0.98* 1.11*   CALCIUM 9.2 8.9   PHOS  --  3.5     Recent Labs     01/29/24  1315   AST 18   ALT 17   BILITOT 0.7   ALKPHOS 67     No results for input(s): \"INR\" in the last 72 hours.  No results for input(s): \"CKTOTAL\", \"TROPONINI\" in the last 72 hours.    Urinalysis:    No results found for: \"NITRU\", \"WBCUA\", \"BACTERIA\", \"RBCUA\", \"BLOODU\", \"SPECGRAV\", 
Iv and tele removed for discharge. Care plan completed for discharge. Appropriate education addressed in discharge instructions. Instructions completed and reviewed with patient. Verbalize understanding of instructions and follow up. Personal belongings gathered. No complaints. Transport wheelchair called. Family member present.     Electronically signed by Estela Bryant RN on 2/1/2024 at 2:51 PM    
Physical Therapy Missed Treatment   Facility/Department: [facility] MED SURG [id number]/[id number]    NAME: Frances Meek    : 1931 (89 y.o.)  MRN: [id number]    Account: [id number]  Gender: female    Chart reviewed, attempted PT at 1035. Patient unavailable 2° to:    [] Hold per nsg request    [x] Pt declined: due to expected DC encouragement and education provided Continued declined. I don't feel like it. I want to conserve my energy   [x] Nsg notified   [] Other notified    [] Pt.. off floor for test/procedure.     [] Pt. Unavailable        Will attempt PT treatment again at earliest convenience.      Electronically signed by Sincere Huang PTA on 24 at 10:37 AM EST    
Agreeable to tx. \"I'm still not feeling my self. I just feel weak.\"    Pain  Patient denies pain pre/post session     OBJECTIVE:     Bed mobility  Supine to Sit: Supervision  Scooting: Supervision  Bed Mobility Comments: HOB flat. Patient completes without bed rail.    Transfers  Sit to Stand: Stand by assistance;Supervision  Stand to Sit: Stand by assistance;Supervision  Bed to Chair: Stand by assistance;Supervision  Comment: Patient completes multiple STS transfers from bed/ recliner. Requires verbal cues for hand placement to improve safety.    Ambulation  Surface: Level tile  Device: Rolling Walker  Assistance: Supervision;Stand by assistance  Quality of Gait: flexed posture, consistent kathleen and step length, decreased speed, no LOB  Distance: 50 feet x2       ASSESSMENT   Body Structures, Functions, Activity Limitations Requiring Skilled Therapeutic Intervention: Decreased functional mobility ;Decreased ADL status;Decreased balance;Decreased endurance;Decreased body mechanics;Decreased strength;Decreased safe awareness  Assessment: Patient completes functional mobility training at supervision/SBA level using ww. Ambulation distance limited by fatigue and weakness. Patient also c/o nausea this AM. Good safety noted throughout session. Patient states she uses a rollator for ambulation at home. Will trial NV.  Therapy Prognosis: Good  Barriers to Learning: none     Discharge Recommendations:  Continue to assess pending progress, Patient would benefit from continued therapy after discharge         Goals  Long Term Goals  Long Term Goal 1: Pt to complete bed mobility with indep  Long Term Goal 2: Pt to complete transfers with indep  Long Term Goal 3: Pt to ambulate 50-150ft with LRD and indep  Long Term Goal 4: Pt to complete TUG within 20sec    PLAN    General Plan: 1 time a day 3-6 times a week  Safety Devices  Type of Devices: All fall risk precautions in place, Left in chair, Chair alarm in place, Bed alarm in 
\"SPECGRAV\", \"GLUCOSEU\"    Radiology:  XR CHEST PORTABLE   Final Result   1. Cardiomegaly with mild pulmonary vascular congestion.   2. Small left pleural effusion versus pleural thickening.                 Assessment/Plan:    89 y.o. female with PMH of AFIB, HTN, chronic diastolic HF, CKD3,  obesity, anxiety who presented with:     Acute / chronic diastolic HF   - treated with IV Lasix  - TTE showed preserved LVEF, reduced RV function, mild-mod AS/MR/TR, RVSP of 63 mmHg  - switched to PO Lasix  - cardiology following     AFIB  - rate is controlled  - on Lopressor, Cardizem, Apixaban  - monitor on telemetry     Hypertensive urgency  - uncontrolled on arrival with SBP in the 180-190s  - resumed home meds  - started on Hydralazine  - monitor BP closely     CKD3  - monitor renal function     Anxiety with BZD dependence  - on Clonazepam per OAR[facility] review       Diet: ADULT DIET; Regular; Low Sodium (2 gm)    Code Status: DNR-CCA      Disposition - back to [facility] tomorrow          Electronically signed by ELIEL YUNG MD on 1/31/2024 at 11:54 AM  
icturus. PERRLA   Respiratory: clear to auscultation bilaterally  Musculoskeletal: No chest wall tenderness. No clubbing or cyanosis.   Cardiovascular: irregularly;y irregular. S1, S2 normal, + soft systolic murmur. No carotid bruit. No JVD. Pulses 2+ and symmetric. Mild edema.   GI: soft, non-tender, non-distended. Bowel sounds normal. No masses,  no organomegaly  MSK: extremities normal, atraumatic, no clubbing. No chest wall pain.    Neurologic: Cranial nerves grossly intact.  No focal motor and/or sensory deficits.  Skin: No rashes or lesions. No cyanosis.       Intake/Output Summary (Last 24 hours) at 2/1/2024 0914  Last data filed at 2/1/2024 0815  Gross per 24 hour   Intake 720 ml   Output --   Net 720 ml       Medications:  metoprolol tartrate, 100 mg, BID  losartan, 25 mg, Daily  furosemide, 20 mg, Daily  sodium chloride flush, 5-40 mL, 2 times per day  clonazePAM, 0.5 mg, Nightly  dilTIAZem, 60 mg, 4 times per day  apixaban, 5 mg, BID  potassium chloride, 20 mEq, Daily      sodium chloride      Recent Labs     01/29/24  1315 01/30/24  0548 01/31/24  0458 02/01/24  0456   HGB 13.4 12.6 13.2 13.3   WBC 7.0 6.0 5.4 5.8    136 134 149    142 142 139   K 4.1 3.7 3.7 3.6   CO2 27 27 26 27   BUN 17 20 19 18   MG  --  2.2 2.2 2.2       EKG: My independent review reveals atrial fibrillation, HR 74 bpm. Chronic lateral T wave inversions. Nonspecific ST changes. LVH. Old septal MI.      Telemetry: My independent review reveals atrial fibrillation, rate controlled. HR 60s    Echocardiogram:    Left Ventricle: Normal left ventricular systolic function with a visually estimated EF of 50 - 55%. Left ventricle size is normal. Moderately increased wall thickness. Findings consistent with moderate concentric hypertrophy. Normal wall motion.    Right Ventricle: Reduced systolic function.    Aortic Valve: Severely calcified cusp. Mild regurgitation. Mild to moderate stenosis of the aortic valve.    Mitral Valve: 
Care  Education Method: Verbal  Education Outcome: Verbalized understanding       ASSESSMENT:   Body Structures, Functions, Activity Limitations Requiring Skilled Therapeutic Intervention: Decreased functional mobility ;Decreased ADL status;Decreased balance;Decreased endurance;Decreased body mechanics;Decreased strength;Decreased safe awareness  Decision Making: Medium Complexity  History: Med  Exam: Med  Clinical Presentation: Med    Therapy Prognosis: Good  Barriers to Learning: none         DISCHARGE RECOMMENDATIONS:  Discharge Recommendations: Continue to assess pending progress, Patient would benefit from continued therapy after discharge    Assessment: Continued PT indicated to maintain physical function within facility, progress mobility and facilitate DC at highest level of indep and safety.  Requires PT Follow-Up: Yes       PLAN OF CARE:  Physical Therapy Plan  General Plan: 1 time a day 3-6 times a week  Current Treatment Recommendations: Strengthening, ROM, Balance training, Functional mobility training, Transfer training, ADL/Self-care training, Endurance training, Gait training, Neuromuscular re-education, Home exercise program, Safety education & training, Patient/Caregiver education & training, Modalities, Equipment evaluation, education, & procurement    Safety Devices  Type of Devices: All fall risk precautions in place    Goals:  Long Term Goals  Long Term Goal 1: Pt to complete bed mobility with indep  Long Term Goal 2: Pt to complete transfers with indep  Long Term Goal 3: Pt to ambulate 50-150ft with LRD and indep  Long Term Goal 4: Pt to complete TUG within 20sec    AMPAC (6 CLICK) BASIC MOBILITY  AM-PAC Inpatient Mobility Raw Score : 18     Therapy Time:   Individual   Time In 1128   Time Out 1138   Minutes 10           Sendy Avila PT, 01/30/24 at 12:38 PM         Definitions for assistance levels  Independent = pt does not require any physical supervision or assistance from another person 
42.03  ADL Inpatient CMS 0-100% Score: 38.32    Therapy key for assistance levels -   Independent/Mod I = Pt. is able to perform task with no assistance but may require a device   Stand by assistance = Pt. does not perform task at an independent level but does not need physical assistance, requires verbal cues  Minimal, Moderate, Maximal Assistance = Pt. requires physical assistance (25%, 50%, 75% assist from helper) for task but is able to actively participate in task   Dependent = Pt. requires total assistance with task and is not able to actively participate with task completion     Plan:  Occupational Therapy Plan  Times Per Week: 1-4x/wk  Current Treatment Recommendations: Strengthening, Balance training, Functional mobility training, Neuromuscular re-education, Self-Care / ADL, Endurance training    Goals:   Patient will:    - Improve functional endurance to tolerate/complete 15-20 mins of ADL's  - Be independent in UB ADLs   - Be SBA in LB ADLs  - Be independent in ADL transfers without LOB  - Be independent in toileting tasks  - Improve bilateral UE strength and endurance to Fair in order to participate in self-care activities as projected.    Patient Goal: Patient goals : TO return to [facility]     Discussed and agreed upon: Yes Comments:       Therapy Time:   Individual   Time In 1345   Time Out 1403   Minutes 18          Eval: 18 minutes     Electronically signed by:    ALYSSIA Bailey,   1/30/2024, 2:10 PM Electronically signed by ALYSSIA Bailey on 1/30/24 at 2:15 PM EST     
structure is normal. Unable to assess regurgitation. Unable to assess severity of stenosis stenosis noted.   Aorta Normal sized aortic root and ascending aorta.   Pericardium No pericardial effusion.       EKG 1/29/24: A-fib 74, Q wave lead III and V1-V2, QTc 472ms    Telemetry 1/30/24: A-fib 60s-70s      Assessment:    Active Hospital Problems    Diagnosis Date Noted    Acute diastolic (congestive) heart failure (HCC) [I50.31] 01/29/2024     Acute on chronic HFpEF--appears to be mild exacerbation  Normal LVF EF 50-55% per echo 1/30/23  Mild to moderate AS/moderate MR/ mild to mod TR per echo 1/30/24  Moderate PHTN with RVSP 63mmHg per echo 1/30/24  Atrial fibrillation, likely chronic, rate controlled.   Uncontrolled HTN  Anxiety  CKD     Plan:  Continue current medications-Lopressor 50 mg p.o. twice daily, Cardizem 60 mg p.o. every 6 hours, add hydralazine 25 mg p.o. 3 times daily for improved BP management, Lasix 20 mg IV twice daily for now and transition to lasix 20mg PO daily in AM, potassium chloride 20 mEq p.o. daily, oral anticoagulation with Eliquis 5 mg p.o. twice daily  Consider addition of ACE inhibitor/ARB in future if renal function remains stable.  Continue to titrate antihypertensive medications as needed  Cardiac/less than 2 g sodium diet recommended  Recommend 2000 mL daily fluid restriction  Monitor on telemetry for any tachycardia or bradycardia arrhythmias  Maintain potassium greater than 4, magnesium greater than 2  GI/DVT prophylaxis  Further recommendations to follow        Electronically signed by ARIELLE Keating on 1/30/2024 at 3:53 PM

## 2024-02-01 NOTE — FLOWSHEET NOTE
Phone call placed to [facility] regarding patient discharge and transportation arrangements. This RN spoke with Armin. [facility] is not able to provide transportation for patient back to facility. Patient updated. This Rn spoke with patient's daughter, Natty. Natty will come to  patient and transport her back to [facility].     Electronically signed by Estela Bryant RN on 2/1/2024 at 2:01 PM

## 2024-02-01 NOTE — PLAN OF CARE
Problem: Discharge Planning  Goal: Discharge to home or other facility with appropriate resources  Outcome: Adequate for Discharge  Flowsheets (Taken 2/1/2024 0811)  Discharge to home or other facility with appropriate resources: Identify barriers to discharge with patient and caregiver     Problem: Safety - Adult  Goal: Free from fall injury  Outcome: Adequate for Discharge  Flowsheets (Taken 2/1/2024 1036)  Free From Fall Injury: Instruct family/caregiver on patient safety     Problem: Chronic Conditions and Co-morbidities  Goal: Patient's chronic conditions and co-morbidity symptoms are monitored and maintained or improved  Outcome: Adequate for Discharge  Flowsheets (Taken 2/1/2024 0811)  Care Plan - Patient's Chronic Conditions and Co-Morbidity Symptoms are Monitored and Maintained or Improved: Monitor and assess patient's chronic conditions and comorbid symptoms for stability, deterioration, or improvement     Problem: Skin/Tissue Integrity  Goal: Absence of new skin breakdown  Description: 1.  Monitor for areas of redness and/or skin breakdown  2.  Assess vascular access sites hourly  3.  Every 4-6 hours minimum:  Change oxygen saturation probe site  4.  Every 4-6 hours:  If on nasal continuous positive airway pressure, respiratory therapy assess nares and determine need for appliance change or resting period.  Outcome: Adequate for Discharge     Problem: ABCDS Injury Assessment  Goal: Absence of physical injury  Outcome: Adequate for Discharge     Problem: Pain  Goal: Verbalizes/displays adequate comfort level or baseline comfort level  Outcome: Adequate for Discharge

## 2024-02-01 NOTE — DISCHARGE SUMMARY
Hospital Medicine Discharge Summary    Frances Meek  :  1931  MRN:  [id number]    Admit date:  2024  Discharge date:  2024    Admitting Physician:  Peyton Perea MD  Primary Care Physician:  Braydon Francis MD      Discharge Diagnoses:    Principal Problem:    Acute diastolic (congestive) heart failure (HCC)  Active Problems:    Valvular heart disease  Resolved Problems:    * No resolved hospital problems. *      Hospital Course:   Frances Meek is a 89 y.o. female that was admitted and treated at [facility] for the following medical issues:     Acute / chronic diastolic HF   - treated with IV Lasix  - TTE showed preserved LVEF, reduced RV function, mild-mod AS/MR/TR, RVSP of 63 mmHg  - switched to PO Lasix  - followed by cardiology      Hypertensive urgency  - uncontrolled on arrival with SBP in the 180-190s  - started on Losartan, increased Toprol and Cardizem  - followed by cardiology      Patient was seen by the following consultants while admitted to [facility]:   Consults:  IP CONSULT TO HEART FAILURE NURSE/COORDINATOR  IP CONSULT TO DIETITIAN  IP CONSULT TO CARDIOLOGY    Significant Diagnostic Studies:    Echo (TTE) complete (PRN contrast/bubble/strain/3D)    Result Date: 2024    Left Ventricle: Normal left ventricular systolic function with a visually estimated EF of 50 - 55%. Left ventricle size is normal. Moderately increased wall thickness. Findings consistent with moderate concentric hypertrophy. Normal wall motion.   Right Ventricle: Reduced systolic function.   Aortic Valve: Severely calcified cusp. Mild regurgitation. Mild to moderate stenosis of the aortic valve.   Mitral Valve: Moderate annular calcification of the mitral valve. Moderate regurgitation.   Tricuspid Valve: Moderately elevated RVSP, consistent with moderate pulmonary hypertension. The estimated RVSP is 63 mmHg.   Left Atrium: Left atrium is severely dilated.   Right

## 2024-02-05 ENCOUNTER — OFFICE VISIT (OUTPATIENT)
Dept: GERIATRIC MEDICINE | Age: 89
End: 2024-02-05
Payer: MEDICARE

## 2024-02-05 DIAGNOSIS — F51.01 PRIMARY INSOMNIA: ICD-10-CM

## 2024-02-05 DIAGNOSIS — I48.0 PAROXYSMAL ATRIAL FIBRILLATION (HCC): ICD-10-CM

## 2024-02-05 DIAGNOSIS — I11.0 HYPERTENSIVE HEART DISEASE WITH HEART FAILURE (HCC): Primary | ICD-10-CM

## 2024-02-05 PROCEDURE — G8484 FLU IMMUNIZE NO ADMIN: HCPCS | Performed by: PHYSICIAN ASSISTANT

## 2024-02-05 PROCEDURE — 99348 HOME/RES VST EST LOW MDM 30: CPT | Performed by: PHYSICIAN ASSISTANT

## 2024-02-05 PROCEDURE — 1036F TOBACCO NON-USER: CPT | Performed by: PHYSICIAN ASSISTANT

## 2024-02-05 PROCEDURE — G8417 CALC BMI ABV UP PARAM F/U: HCPCS | Performed by: PHYSICIAN ASSISTANT

## 2024-02-05 PROCEDURE — 1090F PRES/ABSN URINE INCON ASSESS: CPT | Performed by: PHYSICIAN ASSISTANT

## 2024-02-05 PROCEDURE — 1123F ACP DISCUSS/DSCN MKR DOCD: CPT | Performed by: PHYSICIAN ASSISTANT

## 2024-02-05 ASSESSMENT — ENCOUNTER SYMPTOMS
SHORTNESS OF BREATH: 0
CHOKING: 0
COLOR CHANGE: 0
DIARRHEA: 0
CONSTIPATION: 0
COUGH: 0
ABDOMINAL DISTENTION: 0

## 2024-02-05 NOTE — PROGRESS NOTES
Subjective:      Patient ID: Frances Meek is a pleasant 89 y.o. female who presents today for:  No chief complaint on file.      [facility]    Pt seen today to f/u on insomnia and HTN. Pt BP improved since recent hospital stay. No new crackles, wheezing, CP, SOB, orthopnea, HA, or other cardiopulmonary complaints. She state sleep improved since changing back to klonopin 1mg at HS, and dc trazadone. Denies any acute concern today. VS reviewed. Will cont to closely monitor. Consdier additional lasix if new fluid accumulation develops. For now no new evidence of increased edema since hospital admission and dc.      Patient Active Problem List   Diagnosis    Choroidal nevus of right eye    CKD (chronic kidney disease) stage 3, GFR 30-59 ml/min (HCC)    Diverticulosis    Esophageal reflux    Essential hypertension    AMANDA (generalized anxiety disorder)    HLD (hyperlipidemia)    Mild episode of recurrent major depressive disorder (HCC)    Nonintractable headache    Obesity, Class I, BMI 30-34.9    Primary osteoarthritis of right knee    Paroxysmal atrial fibrillation (HCC)    Psychophysiological insomnia    Vitamin D deficiency    Chronic combined systolic and diastolic heart failure (HCC)    Hypertensive heart disease with heart failure (HCC)    Long term (current) use of anticoagulants    Thrombocytopenia, unspecified (HCC)    Acute diastolic (congestive) heart failure (HCC)    Valvular heart disease     Past Medical History:   Diagnosis Date    Atrial fibrillation (HCC)      No past surgical history on file.  Social History     Socioeconomic History    Marital status:      Spouse name: Not on file    Number of children: Not on file    Years of education: Not on file    Highest education level: Not on file   Occupational History    Not on file   Tobacco Use    Smoking status: Never     Passive exposure: Never    Smokeless tobacco: Never   Substance and Sexual Activity    Alcohol use: Not

## 2024-02-07 LAB — PREALB SERPL-MCNC: 22.7 MG/DL (ref 20–40)

## 2024-02-15 DIAGNOSIS — F41.9 ANXIETY: ICD-10-CM

## 2024-02-15 RX ORDER — CLONAZEPAM 0.5 MG/1
0.5 TABLET ORAL DAILY PRN
Qty: 45 TABLET | Refills: 1 | Status: SHIPPED | OUTPATIENT
Start: 2024-02-15 | End: 2024-05-15

## 2024-02-15 RX ORDER — CLONAZEPAM 0.5 MG/1
TABLET ORAL
Qty: 60 TABLET | Refills: 0 | Status: SHIPPED | OUTPATIENT
Start: 2024-02-15 | End: 2024-05-15

## 2024-02-19 ENCOUNTER — OFFICE VISIT (OUTPATIENT)
Dept: GERIATRIC MEDICINE | Age: 89
End: 2024-02-19
Payer: MEDICARE

## 2024-02-19 DIAGNOSIS — K21.9 GASTROESOPHAGEAL REFLUX DISEASE WITHOUT ESOPHAGITIS: ICD-10-CM

## 2024-02-19 DIAGNOSIS — I50.42 CHRONIC COMBINED SYSTOLIC AND DIASTOLIC HEART FAILURE (HCC): ICD-10-CM

## 2024-02-19 DIAGNOSIS — K57.90 DIVERTICULOSIS: ICD-10-CM

## 2024-02-19 DIAGNOSIS — I48.0 PAROXYSMAL ATRIAL FIBRILLATION (HCC): Primary | ICD-10-CM

## 2024-02-19 DIAGNOSIS — F41.1 GAD (GENERALIZED ANXIETY DISORDER): ICD-10-CM

## 2024-02-19 DIAGNOSIS — F33.0 MILD EPISODE OF RECURRENT MAJOR DEPRESSIVE DISORDER (HCC): ICD-10-CM

## 2024-02-19 DIAGNOSIS — N18.31 STAGE 3A CHRONIC KIDNEY DISEASE (HCC): ICD-10-CM

## 2024-02-19 DIAGNOSIS — D69.6 THROMBOCYTOPENIA, UNSPECIFIED (HCC): ICD-10-CM

## 2024-02-19 DIAGNOSIS — F41.9 ANXIETY: ICD-10-CM

## 2024-02-19 DIAGNOSIS — I10 ESSENTIAL HYPERTENSION: ICD-10-CM

## 2024-02-19 PROCEDURE — G8417 CALC BMI ABV UP PARAM F/U: HCPCS | Performed by: PHYSICIAN ASSISTANT

## 2024-02-19 PROCEDURE — 99349 HOME/RES VST EST MOD MDM 40: CPT | Performed by: PHYSICIAN ASSISTANT

## 2024-02-19 PROCEDURE — 1090F PRES/ABSN URINE INCON ASSESS: CPT | Performed by: PHYSICIAN ASSISTANT

## 2024-02-19 PROCEDURE — G8484 FLU IMMUNIZE NO ADMIN: HCPCS | Performed by: PHYSICIAN ASSISTANT

## 2024-02-19 PROCEDURE — 1123F ACP DISCUSS/DSCN MKR DOCD: CPT | Performed by: PHYSICIAN ASSISTANT

## 2024-02-19 PROCEDURE — 1036F TOBACCO NON-USER: CPT | Performed by: PHYSICIAN ASSISTANT

## 2024-02-19 RX ORDER — CLONAZEPAM 0.5 MG/1
TABLET ORAL
Qty: 60 TABLET | Refills: 0 | Status: SHIPPED | OUTPATIENT
Start: 2024-02-19 | End: 2024-02-20

## 2024-02-20 DIAGNOSIS — F41.9 ANXIETY: ICD-10-CM

## 2024-02-20 RX ORDER — CLONAZEPAM 0.5 MG/1
TABLET ORAL
Qty: 60 TABLET | Refills: 0 | Status: SHIPPED | OUTPATIENT
Start: 2024-02-20 | End: 2024-03-21

## 2024-02-21 ASSESSMENT — ENCOUNTER SYMPTOMS
COLOR CHANGE: 0
DIARRHEA: 0
NAUSEA: 0
CONSTIPATION: 0

## 2024-02-22 NOTE — PROGRESS NOTES
follow-ups on file.      ARIELLE Starks    Electronically signed by: ARIELLE Starks on 2/21/2024    Please note orders entered on site at facility after discussion with appropriate facility nursing/therapy/ / nutritional staff. Current longstanding medical problems and acute medical issues addressed with staff. Available data and data elements in on site paper chart reviewed and analyzed.  Current external consultant notes reviewed in on site chart. Ordered laboratory testing and imaging will be reviewed when available.     Side effects, adverse effects of the medication prescribed today, as well as treatment plan and result expectations have been discussed withthe patient who expresses understanding and desires to proceed.    I spent a total of 30 minutes on the date of service which included preparing to see the patient, face-to-face patient care, performing a medically appropriate examination, completing clinical documentation, and on counseling/ eductaing the patient and the family.      Please note Nuance Dragon PowerMic III software used for dictation of note,  which may contain minor errors due to ambient noise and indiscriminate speech pickup.

## 2024-02-29 ASSESSMENT — ENCOUNTER SYMPTOMS
SHORTNESS OF BREATH: 0
DIARRHEA: 0
COUGH: 0
CHOKING: 0
COLOR CHANGE: 0
ABDOMINAL DISTENTION: 0
CONSTIPATION: 0

## 2024-02-29 NOTE — PROGRESS NOTES
III software used for dictation of note,  which may contain minor errors due to ambient noise and indiscriminate speech pickup.

## 2024-03-01 ENCOUNTER — OFFICE VISIT (OUTPATIENT)
Dept: GERIATRIC MEDICINE | Age: 89
End: 2024-03-01
Payer: MEDICARE

## 2024-03-01 DIAGNOSIS — I50.42 CHRONIC COMBINED SYSTOLIC AND DIASTOLIC HEART FAILURE (HCC): ICD-10-CM

## 2024-03-01 DIAGNOSIS — N18.31 STAGE 3A CHRONIC KIDNEY DISEASE (HCC): ICD-10-CM

## 2024-03-01 DIAGNOSIS — I11.0 HYPERTENSIVE HEART DISEASE WITH HEART FAILURE (HCC): ICD-10-CM

## 2024-03-01 DIAGNOSIS — I48.0 PAROXYSMAL ATRIAL FIBRILLATION (HCC): Primary | ICD-10-CM

## 2024-03-01 DIAGNOSIS — F41.1 GAD (GENERALIZED ANXIETY DISORDER): ICD-10-CM

## 2024-03-01 PROCEDURE — 1090F PRES/ABSN URINE INCON ASSESS: CPT | Performed by: PHYSICIAN ASSISTANT

## 2024-03-01 PROCEDURE — G8484 FLU IMMUNIZE NO ADMIN: HCPCS | Performed by: PHYSICIAN ASSISTANT

## 2024-03-01 PROCEDURE — 1123F ACP DISCUSS/DSCN MKR DOCD: CPT | Performed by: PHYSICIAN ASSISTANT

## 2024-03-01 PROCEDURE — 99349 HOME/RES VST EST MOD MDM 40: CPT | Performed by: PHYSICIAN ASSISTANT

## 2024-03-01 PROCEDURE — G8417 CALC BMI ABV UP PARAM F/U: HCPCS | Performed by: PHYSICIAN ASSISTANT

## 2024-03-01 PROCEDURE — 1036F TOBACCO NON-USER: CPT | Performed by: PHYSICIAN ASSISTANT

## 2024-03-01 ASSESSMENT — ENCOUNTER SYMPTOMS
CHOKING: 0
CONSTIPATION: 0
DIARRHEA: 0
COUGH: 0
SHORTNESS OF BREATH: 0
ABDOMINAL DISTENTION: 0
COLOR CHANGE: 0

## 2024-03-01 NOTE — PROGRESS NOTES
pulses are 1+ on the right side and 1+ on the left side.   Pulmonary:      Effort: Pulmonary effort is normal. No respiratory distress.      Breath sounds: Normal breath sounds. No rhonchi.   Abdominal:      General: Bowel sounds are normal. There is no distension.      Palpations: Abdomen is soft.      Tenderness: There is no abdominal tenderness.   Musculoskeletal:      Right lower le+ Pitting Edema present.      Left lower le+ Pitting Edema present.   Skin:     General: Skin is warm and dry.   Neurological:      Mental Status: She is alert and oriented to person, place, and time. Mental status is at baseline.      Motor: Weakness present.   Psychiatric:         Mood and Affect: Mood normal.         Behavior: Behavior normal. Behavior is cooperative.         Thought Content: Thought content normal.         Assessment:       Diagnosis Orders   1. Paroxysmal atrial fibrillation (HCC)        2. Hypertensive heart disease with heart failure (HCC)        3. Chronic combined systolic and diastolic heart failure (HCC)        4. Stage 3a chronic kidney disease (HCC)        5. AMANDA (generalized anxiety disorder)              Plan:      No orders of the defined types were placed in this encounter.    No orders of the defined types were placed in this encounter.      Will have patient seen by cardiology due to request.  Increase Lasix to 30 mg p.o. daily x 5 days monitor lower leg edema.  Emphasize light compression with Tubigrip's.  Vital signs overall stable, pulse ox within normal limits.  No new desire for anxiolytics.  Will continue Klonopin as ordered.  Monitor BMP as needed.  Will consider upping routine dose of Lasix if good response, will do BMP in that setting.    No follow-ups on file.      ARIELLE Starks    Electronically signed by: ARIELLE Starks on 3/1/2024    Please note orders entered on site at facility after discussion with appropriate facility nursing/therapy/ / nutritional staff.

## 2024-03-05 ENCOUNTER — OFFICE VISIT (OUTPATIENT)
Dept: GERIATRIC MEDICINE | Age: 89
End: 2024-03-05
Payer: MEDICARE

## 2024-03-05 DIAGNOSIS — R31.9 HEMATURIA, UNSPECIFIED TYPE: ICD-10-CM

## 2024-03-05 DIAGNOSIS — N39.0 RECURRENT UTI: Primary | ICD-10-CM

## 2024-03-05 DIAGNOSIS — I50.42 CHRONIC COMBINED SYSTOLIC AND DIASTOLIC HEART FAILURE (HCC): ICD-10-CM

## 2024-03-05 DIAGNOSIS — R60.0 BILATERAL EDEMA OF LOWER EXTREMITY: ICD-10-CM

## 2024-03-05 PROCEDURE — 1090F PRES/ABSN URINE INCON ASSESS: CPT | Performed by: PHYSICIAN ASSISTANT

## 2024-03-05 PROCEDURE — G8417 CALC BMI ABV UP PARAM F/U: HCPCS | Performed by: PHYSICIAN ASSISTANT

## 2024-03-05 PROCEDURE — 1123F ACP DISCUSS/DSCN MKR DOCD: CPT | Performed by: PHYSICIAN ASSISTANT

## 2024-03-05 PROCEDURE — 99348 HOME/RES VST EST LOW MDM 30: CPT | Performed by: PHYSICIAN ASSISTANT

## 2024-03-05 PROCEDURE — G8484 FLU IMMUNIZE NO ADMIN: HCPCS | Performed by: PHYSICIAN ASSISTANT

## 2024-03-05 PROCEDURE — 1036F TOBACCO NON-USER: CPT | Performed by: PHYSICIAN ASSISTANT

## 2024-03-06 LAB
BACTERIA URNS QL MICRO: ABNORMAL /HPF
BILIRUB UR QL STRIP: NEGATIVE
CLARITY UR: ABNORMAL
COLOR UR: YELLOW
EPI CELLS #/AREA URNS AUTO: ABNORMAL /HPF (ref 0–5)
ERYTHROCYTE [DISTWIDTH] IN BLOOD BY AUTOMATED COUNT: 13.2 % (ref 11.5–14.5)
GLUCOSE UR STRIP-MCNC: NEGATIVE MG/DL
HCT VFR BLD AUTO: 37.6 % (ref 37–47)
HGB BLD-MCNC: 12.8 G/DL (ref 12–16)
HGB UR QL STRIP: ABNORMAL
HYALINE CASTS #/AREA URNS AUTO: ABNORMAL /HPF (ref 0–5)
KETONES UR STRIP-MCNC: NEGATIVE MG/DL
LEUKOCYTE ESTERASE UR QL STRIP: ABNORMAL
MCH RBC QN AUTO: 33.1 PG (ref 27–31.3)
MCHC RBC AUTO-ENTMCNC: 34 % (ref 33–37)
MCV RBC AUTO: 97.2 FL (ref 79.4–94.8)
NITRITE UR QL STRIP: POSITIVE
PH UR STRIP: 6.5 [PH] (ref 5–9)
PLATELET # BLD AUTO: 139 K/UL (ref 130–400)
PROT UR STRIP-MCNC: NEGATIVE MG/DL
RBC # BLD AUTO: 3.87 M/UL (ref 4.2–5.4)
RBC #/AREA URNS AUTO: ABNORMAL /HPF (ref 0–5)
SP GR UR STRIP: 1.01 (ref 1–1.03)
UROBILINOGEN UR STRIP-ACNC: 0.2 E.U./DL
WBC # BLD AUTO: 5.3 K/UL (ref 4.8–10.8)
WBC #/AREA URNS AUTO: ABNORMAL /HPF (ref 0–5)

## 2024-03-07 LAB
BACTERIA UR CULT: ABNORMAL
BACTERIA UR CULT: ABNORMAL
ORGANISM: ABNORMAL

## 2024-03-13 ASSESSMENT — ENCOUNTER SYMPTOMS
CONSTIPATION: 0
ABDOMINAL DISTENTION: 0
ABDOMINAL PAIN: 0
RECTAL PAIN: 0
COUGH: 0
COLOR CHANGE: 0
NAUSEA: 0
SHORTNESS OF BREATH: 0
CHOKING: 0
BLOOD IN STOOL: 0
DIARRHEA: 0
ANAL BLEEDING: 0

## 2024-03-13 NOTE — PROGRESS NOTES
Subjective:      Patient ID: Frances Meek is a pleasant 89 y.o. female who presents today for:  No chief complaint on file.      [facility]    Patient seen today for concern over possible hematuria as well as BLE edema follow-up.  Patient's lower legs do appear better with recent bump up and Lasix for short duration.  Patient states that she had presumably had some blood in brief and possible dark and/slurry of blood in the urine.  Discussed doing UA as well as CBC to assess hemoglobin medic.  No changes to diuretics at this time.  Vital signs been stable per nurse.  Follow-up as needed and await UA results.      Patient Active Problem List   Diagnosis    Choroidal nevus of right eye    CKD (chronic kidney disease) stage 3, GFR 30-59 ml/min (HCC)    Diverticulosis    Esophageal reflux    Essential hypertension    AMANDA (generalized anxiety disorder)    HLD (hyperlipidemia)    Mild episode of recurrent major depressive disorder (HCC)    Nonintractable headache    Obesity, Class I, BMI 30-34.9    Primary osteoarthritis of right knee    Paroxysmal atrial fibrillation (HCC)    Psychophysiological insomnia    Vitamin D deficiency    Chronic combined systolic and diastolic heart failure (HCC)    Hypertensive heart disease with heart failure (HCC)    Long term (current) use of anticoagulants    Thrombocytopenia, unspecified (HCC)    Acute diastolic (congestive) heart failure (HCC)    Valvular heart disease     Past Medical History:   Diagnosis Date    Atrial fibrillation (HCC)      No past surgical history on file.  Social History     Socioeconomic History    Marital status:      Spouse name: Not on file    Number of children: Not on file    Years of education: Not on file    Highest education level: Not on file   Occupational History    Not on file   Tobacco Use    Smoking status: Never     Passive exposure: Never    Smokeless tobacco: Never   Substance and Sexual Activity    Alcohol use: Not

## 2024-03-21 DIAGNOSIS — F41.9 ANXIETY: ICD-10-CM

## 2024-03-21 RX ORDER — CLONAZEPAM 0.5 MG/1
1 TABLET ORAL SEE ADMIN INSTRUCTIONS
Qty: 90 TABLET | Refills: 0 | Status: SHIPPED | OUTPATIENT
Start: 2024-03-21 | End: 2024-06-19

## 2024-03-27 ENCOUNTER — APPOINTMENT (OUTPATIENT)
Dept: CT IMAGING | Age: 89
End: 2024-03-27
Payer: MEDICARE

## 2024-03-27 ENCOUNTER — APPOINTMENT (OUTPATIENT)
Dept: GENERAL RADIOLOGY | Age: 89
End: 2024-03-27
Payer: MEDICARE

## 2024-03-27 ENCOUNTER — HOSPITAL ENCOUNTER (EMERGENCY)
Age: 89
Discharge: HOME | End: 2024-03-27
Attending: EMERGENCY MEDICINE
Payer: MEDICARE

## 2024-03-27 VITALS
RESPIRATION RATE: 18 BRPM | DIASTOLIC BLOOD PRESSURE: 68 MMHG | TEMPERATURE: 99 F | SYSTOLIC BLOOD PRESSURE: 160 MMHG | OXYGEN SATURATION: 97 % | HEART RATE: 78 BPM | BODY MASS INDEX: 32.2 KG/M2 | WEIGHT: 175 LBS | HEIGHT: 62 IN

## 2024-03-27 DIAGNOSIS — R60.9 PERIPHERAL EDEMA: ICD-10-CM

## 2024-03-27 DIAGNOSIS — S09.90XA INJURY OF HEAD, INITIAL ENCOUNTER: Primary | ICD-10-CM

## 2024-03-27 DIAGNOSIS — S80.01XA CONTUSION OF RIGHT KNEE, INITIAL ENCOUNTER: ICD-10-CM

## 2024-03-27 DIAGNOSIS — E87.70 HYPERVOLEMIA, UNSPECIFIED HYPERVOLEMIA TYPE: ICD-10-CM

## 2024-03-27 LAB
ALBUMIN SERPL-MCNC: 3.8 G/DL (ref 3.5–4.6)
ALT SERPL-CCNC: 16 U/L (ref 0–33)
AST SERPL-CCNC: 22 U/L (ref 0–35)
BASOPHILS # BLD: 0 K/UL (ref 0–0.2)
BASOPHILS NFR BLD: 0.5 %
BILIRUB SERPL-MCNC: 0.5 MG/DL (ref 0.2–0.7)
BUN SERPL-MCNC: 12 MG/DL (ref 8–23)
CALCIUM SERPL-MCNC: 9.1 MG/DL (ref 8.5–9.9)
CHLORIDE SERPL-SCNC: 102 MEQ/L (ref 95–107)
CO2 SERPL-SCNC: 28 MEQ/L (ref 20–31)
COLOR UR: YELLOW
EOSINOPHIL # BLD: 0.1 K/UL (ref 0–0.7)
EOSINOPHIL NFR BLD: 1.9 %
ERYTHROCYTE [DISTWIDTH] IN BLOOD BY AUTOMATED COUNT: 13.2 % (ref 11.5–14.5)
GLUCOSE UR STRIP-MCNC: NEGATIVE MG/DL
HGB BLD-MCNC: 12 G/DL (ref 12–16)
HGB UR QL STRIP: NEGATIVE
KETONES UR STRIP-MCNC: NEGATIVE MG/DL
LEUKOCYTE ESTERASE UR QL STRIP: NEGATIVE
LYMPHOCYTES # BLD: 1 K/UL (ref 1–4.8)
LYMPHOCYTES NFR BLD: 16.7 %
MAGNESIUM SERPL-MCNC: 2 MG/DL (ref 1.7–2.4)
MCH RBC QN AUTO: 33.4 PG (ref 27–31.3)
MONOCYTES # BLD: 0.4 K/UL (ref 0.2–0.8)
MONOCYTES NFR BLD: 6.4 %
NITRITE UR QL STRIP: NEGATIVE
PH UR STRIP: 6.5 [PH] (ref 5–9)
POTASSIUM SERPL-SCNC: 4.1 MEQ/L (ref 3.4–4.9)
PROT SERPL-MCNC: 6.7 G/DL (ref 6.3–8)
PROT UR STRIP-MCNC: NEGATIVE MG/DL
PROTHROMBIN TIME: 16 SEC (ref 12.3–14.9)
RBC # BLD AUTO: 3.59 M/UL (ref 4.2–5.4)
SODIUM SERPL-SCNC: 139 MEQ/L (ref 135–144)
SP GR UR STRIP: 1.01 (ref 1–1.03)
TROPONIN, HIGH SENSITIVITY: 16 NG/L (ref 0–19)
TSH SERPL-MCNC: 4.03 UIU/ML (ref 0.44–3.86)
UROBILINOGEN UR STRIP-ACNC: 0.2 E.U./DL
WBC # BLD AUTO: 6.2 K/UL (ref 4.8–10.8)

## 2024-03-27 PROCEDURE — 84439 ASSAY OF FREE THYROXINE: CPT

## 2024-03-27 PROCEDURE — 84443 ASSAY THYROID STIM HORMONE: CPT

## 2024-03-27 PROCEDURE — 85610 PROTHROMBIN TIME: CPT

## 2024-03-27 PROCEDURE — 80053 COMPREHEN METABOLIC PANEL: CPT

## 2024-03-27 PROCEDURE — 83735 ASSAY OF MAGNESIUM: CPT

## 2024-03-27 PROCEDURE — 84484 ASSAY OF TROPONIN QUANT: CPT

## 2024-03-27 PROCEDURE — 72125 CT NECK SPINE W/O DYE: CPT

## 2024-03-27 PROCEDURE — 99284 EMERGENCY DEPT VISIT MOD MDM: CPT

## 2024-03-27 PROCEDURE — 81003 URINALYSIS AUTO W/O SCOPE: CPT

## 2024-03-27 PROCEDURE — 36415 COLL VENOUS BLD VENIPUNCTURE: CPT

## 2024-03-27 PROCEDURE — 70450 CT HEAD/BRAIN W/O DYE: CPT

## 2024-03-27 PROCEDURE — 85025 COMPLETE CBC W/AUTO DIFF WBC: CPT

## 2024-03-27 PROCEDURE — 83880 ASSAY OF NATRIURETIC PEPTIDE: CPT

## 2024-03-27 RX ORDER — FUROSEMIDE 20 MG/1
40 TABLET ORAL DAILY
Qty: 60 TABLET | Refills: 3 | Status: SHIPPED | OUTPATIENT
Start: 2024-03-27

## 2024-03-27 ASSESSMENT — PAIN DESCRIPTION - PAIN TYPE
TYPE: ACUTE PAIN
TYPE: ACUTE PAIN

## 2024-03-27 ASSESSMENT — PAIN DESCRIPTION - LOCATION
LOCATION: KNEE
LOCATION: KNEE

## 2024-03-27 ASSESSMENT — PAIN SCALES - GENERAL
PAINLEVEL_OUTOF10: 3
PAINLEVEL_OUTOF10: 3

## 2024-03-27 ASSESSMENT — PAIN DESCRIPTION - ORIENTATION
ORIENTATION: RIGHT
ORIENTATION: RIGHT

## 2024-03-27 ASSESSMENT — PAIN - FUNCTIONAL ASSESSMENT: PAIN_FUNCTIONAL_ASSESSMENT: 0-10

## 2024-03-27 NOTE — ED TRIAGE NOTES
Pt presents to ER via squad from [facility] living.  Conscious, Aox4.  \"Fell while trying to set orange juice down and lost my balance.\"  Pt states she struck her head \"not very hard, I think on the dresser but for sure on the carpeted floor.\"  Pt did not lose consciousness.  Pt denies headache, denies change in vision  Pt states pos blood thinners  Pt states she uses walker for ambulation  Pt states right knee pain from fall

## 2024-03-27 NOTE — ED PROVIDER NOTES
threatening deterioration in the patient's condition which required my urgent intervention.      CONSULTS:  None    PROCEDURES:  Unless otherwise noted below, none     Procedures    FINAL IMPRESSION      1. Injury of head, initial encounter    2. Contusion of right knee, initial encounter    3. Hypervolemia, unspecified hypervolemia type    4. Peripheral edema          DISPOSITION/PLAN   DISPOSITION Decision To Discharge 03/27/2024 12:01:16 PM      PATIENT REFERRED TO:  Braydon Francis MD  [address] [id number]  [phone]    In 2 days        DISCHARGE MEDICATIONS:  New Prescriptions    FUROSEMIDE (LASIX) 20 MG TABLET    Take 2 tablets by mouth daily Hold the patient's 20 mg a day Lasix and give 40 mg a day Lasix for the next 7 days then return back to the 20 mg a day Lasix.          (Please note that portions of this note were completed with a voice recognition program.  Efforts were made to edit the dictations but occasionally words are mis-transcribed.)    Kyle Hill MD (electronically signed)  Attending Emergency Physician         Kyle Hill MD  03/27/24 [id number]       Kyle Hill MD  03/27/24 [id number]

## 2024-04-02 ENCOUNTER — OFFICE VISIT (OUTPATIENT)
Dept: GERIATRIC MEDICINE | Age: 89
End: 2024-04-02
Payer: MEDICARE

## 2024-04-02 DIAGNOSIS — Z04.3 ENCOUNTER FOR POST FALL EXAMINATION: ICD-10-CM

## 2024-04-02 DIAGNOSIS — M25.561 ACUTE PAIN OF RIGHT KNEE: Primary | ICD-10-CM

## 2024-04-02 PROCEDURE — 1090F PRES/ABSN URINE INCON ASSESS: CPT | Performed by: PHYSICIAN ASSISTANT

## 2024-04-02 PROCEDURE — 99348 HOME/RES VST EST LOW MDM 30: CPT | Performed by: PHYSICIAN ASSISTANT

## 2024-04-02 PROCEDURE — 1123F ACP DISCUSS/DSCN MKR DOCD: CPT | Performed by: PHYSICIAN ASSISTANT

## 2024-04-02 PROCEDURE — G8417 CALC BMI ABV UP PARAM F/U: HCPCS | Performed by: PHYSICIAN ASSISTANT

## 2024-04-02 PROCEDURE — 1036F TOBACCO NON-USER: CPT | Performed by: PHYSICIAN ASSISTANT

## 2024-04-05 LAB
BASOPHILS # BLD: 0 K/UL (ref 0–0.2)
BASOPHILS NFR BLD: 0.6 %
BNP BLD-MCNC: 8034 PG/ML
EOSINOPHIL # BLD: 0.1 K/UL (ref 0–0.7)
EOSINOPHIL NFR BLD: 2.6 %
ERYTHROCYTE [DISTWIDTH] IN BLOOD BY AUTOMATED COUNT: 13.3 % (ref 11.5–14.5)
HCT VFR BLD AUTO: 35.7 % (ref 37–47)
HGB BLD-MCNC: 12 G/DL (ref 12–16)
LDH SERPL-CCNC: 164 U/L (ref 135–214)
LYMPHOCYTES # BLD: 1.2 K/UL (ref 1–4.8)
LYMPHOCYTES NFR BLD: 24.5 %
MCH RBC QN AUTO: 33.2 PG (ref 27–31.3)
MCHC RBC AUTO-ENTMCNC: 33.6 % (ref 33–37)
MCV RBC AUTO: 98.9 FL (ref 79.4–94.8)
MONOCYTES # BLD: 0.5 K/UL (ref 0.2–0.8)
MONOCYTES NFR BLD: 10.2 %
NEUTROPHILS # BLD: 3.1 K/UL (ref 1.4–6.5)
NEUTS SEG NFR BLD: 61.9 %
PLATELET # BLD AUTO: 141 K/UL (ref 130–400)
RBC # BLD AUTO: 3.61 M/UL (ref 4.2–5.4)
WBC # BLD AUTO: 5 K/UL (ref 4.8–10.8)

## 2024-04-09 ENCOUNTER — OFFICE VISIT (OUTPATIENT)
Dept: GERIATRIC MEDICINE | Age: 89
End: 2024-04-09
Payer: MEDICARE

## 2024-04-09 DIAGNOSIS — Z74.09 POOR MOBILITY: ICD-10-CM

## 2024-04-09 DIAGNOSIS — R10.13 DYSPEPSIA: Primary | ICD-10-CM

## 2024-04-09 PROCEDURE — 99348 HOME/RES VST EST LOW MDM 30: CPT | Performed by: PHYSICIAN ASSISTANT

## 2024-04-09 PROCEDURE — 1090F PRES/ABSN URINE INCON ASSESS: CPT | Performed by: PHYSICIAN ASSISTANT

## 2024-04-09 PROCEDURE — 1036F TOBACCO NON-USER: CPT | Performed by: PHYSICIAN ASSISTANT

## 2024-04-09 PROCEDURE — G8417 CALC BMI ABV UP PARAM F/U: HCPCS | Performed by: PHYSICIAN ASSISTANT

## 2024-04-09 PROCEDURE — 1123F ACP DISCUSS/DSCN MKR DOCD: CPT | Performed by: PHYSICIAN ASSISTANT

## 2024-04-16 ENCOUNTER — OFFICE VISIT (OUTPATIENT)
Dept: GERIATRIC MEDICINE | Age: 89
End: 2024-04-16
Payer: MEDICARE

## 2024-04-16 DIAGNOSIS — I50.42 CHRONIC COMBINED SYSTOLIC AND DIASTOLIC HEART FAILURE (HCC): ICD-10-CM

## 2024-04-16 DIAGNOSIS — R60.0 BILATERAL EDEMA OF LOWER EXTREMITY: Primary | ICD-10-CM

## 2024-04-16 PROCEDURE — 99348 HOME/RES VST EST LOW MDM 30: CPT | Performed by: PHYSICIAN ASSISTANT

## 2024-04-16 PROCEDURE — 1123F ACP DISCUSS/DSCN MKR DOCD: CPT | Performed by: PHYSICIAN ASSISTANT

## 2024-04-16 PROCEDURE — G8417 CALC BMI ABV UP PARAM F/U: HCPCS | Performed by: PHYSICIAN ASSISTANT

## 2024-04-16 PROCEDURE — 1036F TOBACCO NON-USER: CPT | Performed by: PHYSICIAN ASSISTANT

## 2024-04-16 PROCEDURE — 1090F PRES/ABSN URINE INCON ASSESS: CPT | Performed by: PHYSICIAN ASSISTANT

## 2024-04-17 LAB
ANION GAP SERPL CALCULATED.3IONS-SCNC: 11 MEQ/L (ref 9–15)
BUN SERPL-MCNC: 14 MG/DL (ref 8–23)
CALCIUM SERPL-MCNC: 9.3 MG/DL (ref 8.5–9.9)
CHLORIDE SERPL-SCNC: 100 MEQ/L (ref 95–107)
CO2 SERPL-SCNC: 30 MEQ/L (ref 20–31)
CREAT SERPL-MCNC: 1.17 MG/DL (ref 0.5–0.9)
GLUCOSE SERPL-MCNC: 80 MG/DL (ref 70–99)
POTASSIUM SERPL-SCNC: 3.7 MEQ/L (ref 3.4–4.9)
SODIUM SERPL-SCNC: 141 MEQ/L (ref 135–144)

## 2024-04-18 ASSESSMENT — ENCOUNTER SYMPTOMS
ANAL BLEEDING: 0
NAUSEA: 0
RECTAL PAIN: 0
ABDOMINAL PAIN: 0
DIARRHEA: 0
CHOKING: 0
CONSTIPATION: 0
ABDOMINAL DISTENTION: 0
COLOR CHANGE: 0
COUGH: 0
SHORTNESS OF BREATH: 0

## 2024-04-18 NOTE — PROGRESS NOTES
Subjective:      Patient ID: Frances Meek is a pleasant 89 y.o. female who presents today for:  Chief Complaint   Patient presents with    Other       [facility]    Following up with patient today for right knee pain status post fall.  No evidence of significant trauma.  X-ray negative.  Did recommend simple ice pack to the knee every 15min on/45 min off.  Utilize Tylenol for pain.  Monitor for any further change.  Patient able to ambulate and bear weight with minimal issue.      Patient Active Problem List   Diagnosis    Choroidal nevus of right eye    CKD (chronic kidney disease) stage 3, GFR 30-59 ml/min (HCC)    Diverticulosis    Esophageal reflux    Essential hypertension    AMANDA (generalized anxiety disorder)    HLD (hyperlipidemia)    Mild episode of recurrent major depressive disorder (HCC)    Nonintractable headache    Obesity, Class I, BMI 30-34.9    Primary osteoarthritis of right knee    Paroxysmal atrial fibrillation (HCC)    Psychophysiological insomnia    Vitamin D deficiency    Chronic combined systolic and diastolic heart failure (HCC)    Hypertensive heart disease with heart failure (HCC)    Long term (current) use of anticoagulants    Thrombocytopenia, unspecified (HCC)    Acute diastolic (congestive) heart failure (HCC)    Valvular heart disease     Past Medical History:   Diagnosis Date    Atrial fibrillation (HCC)      No past surgical history on file.  Social History     Socioeconomic History    Marital status:      Spouse name: Not on file    Number of children: Not on file    Years of education: Not on file    Highest education level: Not on file   Occupational History    Not on file   Tobacco Use    Smoking status: Never     Passive exposure: Never    Smokeless tobacco: Never   Substance and Sexual Activity    Alcohol use: Not Currently    Drug use: Never    Sexual activity: Not Currently   Other Topics Concern    Not on file   Social History Narrative    Not on

## 2024-04-23 ASSESSMENT — ENCOUNTER SYMPTOMS
DIARRHEA: 0
ABDOMINAL PAIN: 0
CHOKING: 0
CONSTIPATION: 0
SHORTNESS OF BREATH: 0
RECTAL PAIN: 0
ABDOMINAL DISTENTION: 0
NAUSEA: 0
COUGH: 0
COLOR CHANGE: 0
ANAL BLEEDING: 0

## 2024-04-23 NOTE — PROGRESS NOTES
distension.      Palpations: Abdomen is soft.      Tenderness: There is no abdominal tenderness. There is no right CVA tenderness, left CVA tenderness or guarding.   Genitourinary:     Comments: Deferred  Musculoskeletal:      Right lower le+ Pitting Edema present.      Left lower le+ Pitting Edema present.   Skin:     General: Skin is warm and dry.      Coloration: Skin is not pale.      Findings: No bruising.   Neurological:      Mental Status: She is alert and oriented to person, place, and time. Mental status is at baseline.      Motor: Weakness present.   Psychiatric:         Mood and Affect: Mood normal.         Behavior: Behavior normal. Behavior is cooperative.         Thought Content: Thought content normal.         Assessment:       Diagnosis Orders   1. Dyspepsia        2. Poor mobility              Plan:      No orders of the defined types were placed in this encounter.    No orders of the defined types were placed in this encounter.      Famotidine 2 mg p.o. daily as needed with 1-2 tabs times p.o. 3 times daily with meals as needed as well as first-line.  Will emphasize PT/OT if further poor mobility and poor activity levels, otherwise no new complaints or concerns    No follow-ups on file.      ARIELLE Starks    Electronically signed by: ARIELLE Starks on 2024    Please note orders entered on site at facility after discussion with appropriate facility nursing/therapy/ / nutritional staff. Current longstanding medical problems and acute medical issues addressed with staff. Available data and data elements in on site paper chart reviewed and analyzed.  Current external consultant notes reviewed in on site chart. Ordered laboratory testing and imaging will be reviewed when available.     Side effects, adverse effects of the medication prescribed today, as well as treatment plan and result expectations have been discussed withthe patient who expresses understanding and desires to

## 2024-04-26 ASSESSMENT — ENCOUNTER SYMPTOMS
SHORTNESS OF BREATH: 0
NAUSEA: 0
ABDOMINAL PAIN: 0
ANAL BLEEDING: 0
ABDOMINAL DISTENTION: 0
RECTAL PAIN: 0
DIARRHEA: 0
CHOKING: 0
CONSTIPATION: 0
COUGH: 0
COLOR CHANGE: 0

## 2024-04-27 NOTE — PROGRESS NOTES
Psychiatric:         Mood and Affect: Mood normal.         Behavior: Behavior normal. Behavior is cooperative.         Thought Content: Thought content normal.         Assessment:       Diagnosis Orders   1. Bilateral edema of lower extremity        2. Chronic combined systolic and diastolic heart failure (HCC)              Plan:      No orders of the defined types were placed in this encounter.    No orders of the defined types were placed in this encounter.      Finish 4 day course of Lasix, then return to 20mg daily. Will emphasize ACE wraps when up, but off at night.     No follow-ups on file.      ARIELLE Starks    Electronically signed by: ARIELLE Starks on 4/26/2024    Please note orders entered on site at facility after discussion with appropriate facility nursing/therapy/ / nutritional staff. Current longstanding medical problems and acute medical issues addressed with staff. Available data and data elements in on site paper chart reviewed and analyzed.  Current external consultant notes reviewed in on site chart. Ordered laboratory testing and imaging will be reviewed when available.     Side effects, adverse effects of the medication prescribed today, as well as treatment plan and result expectations have been discussed withthe patient who expresses understanding and desires to proceed.    I spent a total of 30 minutes on the date of service which included preparing to see the patient, face-to-face patient care, performing a medically appropriate examination, completing clinical documentation, and on counseling/ eductaing the patient and the family.      Please note Nuance Dragon PowerMic III software used for dictation of note,  which may contain minor errors due to ambient noise and indiscriminate speech pickup.

## 2024-04-29 DIAGNOSIS — F41.9 ANXIETY: Primary | ICD-10-CM

## 2024-04-29 RX ORDER — CLONAZEPAM 0.5 MG/1
0.5 TABLET ORAL DAILY PRN
Qty: 14 TABLET | Refills: 0 | Status: SHIPPED | OUTPATIENT
Start: 2024-04-29 | End: 2024-05-13

## 2024-04-30 ENCOUNTER — OFFICE VISIT (OUTPATIENT)
Dept: GERIATRIC MEDICINE | Age: 89
End: 2024-04-30

## 2024-04-30 DIAGNOSIS — Z91.89 AT RISK FOR DEPRESSION: ICD-10-CM

## 2024-04-30 DIAGNOSIS — F43.21 GRIEF: ICD-10-CM

## 2024-04-30 DIAGNOSIS — F41.1 GAD (GENERALIZED ANXIETY DISORDER): Primary | ICD-10-CM

## 2024-05-06 DIAGNOSIS — F41.9 ANXIETY: ICD-10-CM

## 2024-05-06 RX ORDER — CLONAZEPAM 0.5 MG/1
0.5 TABLET ORAL SEE ADMIN INSTRUCTIONS
Qty: 28 TABLET | Refills: 0 | Status: SHIPPED | OUTPATIENT
Start: 2024-05-06 | End: 2024-05-20

## 2024-05-08 ASSESSMENT — ENCOUNTER SYMPTOMS
ABDOMINAL PAIN: 0
COLOR CHANGE: 0
SHORTNESS OF BREATH: 0

## 2024-05-08 NOTE — PROGRESS NOTES
imaging will be reviewed when available.     Side effects, adverse effects of the medication prescribed today, as well as treatment plan and result expectations have been discussed withthe patient who expresses understanding and desires to proceed.    I spent a total of 30 minutes on the date of service which included preparing to see the patient, face-to-face patient care, performing a medically appropriate examination, completing clinical documentation, and on counseling/ eductaing the patient and the family.      Please note Nuance Dragon PowerMic III software used for dictation of note,  which may contain minor errors due to ambient noise and indiscriminate speech pickup.

## 2024-05-13 DIAGNOSIS — F41.9 ANXIETY: ICD-10-CM

## 2024-05-13 RX ORDER — CLONAZEPAM 0.5 MG/1
0.5 TABLET ORAL SEE ADMIN INSTRUCTIONS
Qty: 42 TABLET | Refills: 0 | Status: SHIPPED | OUTPATIENT
Start: 2024-05-13 | End: 2024-05-27

## 2024-05-14 ENCOUNTER — OFFICE VISIT (OUTPATIENT)
Dept: GERIATRIC MEDICINE | Age: 89
End: 2024-05-14
Payer: MEDICARE

## 2024-05-14 DIAGNOSIS — Z63.4 GRIEF AT LOSS OF CHILD: Primary | ICD-10-CM

## 2024-05-14 DIAGNOSIS — F32.9 REACTIVE DEPRESSION: ICD-10-CM

## 2024-05-14 DIAGNOSIS — F41.1 GAD (GENERALIZED ANXIETY DISORDER): ICD-10-CM

## 2024-05-14 DIAGNOSIS — F43.21 GRIEF AT LOSS OF CHILD: Primary | ICD-10-CM

## 2024-05-14 PROCEDURE — 1123F ACP DISCUSS/DSCN MKR DOCD: CPT | Performed by: PHYSICIAN ASSISTANT

## 2024-05-14 PROCEDURE — 1090F PRES/ABSN URINE INCON ASSESS: CPT | Performed by: PHYSICIAN ASSISTANT

## 2024-05-14 PROCEDURE — 99348 HOME/RES VST EST LOW MDM 30: CPT | Performed by: PHYSICIAN ASSISTANT

## 2024-05-14 PROCEDURE — 1036F TOBACCO NON-USER: CPT | Performed by: PHYSICIAN ASSISTANT

## 2024-05-14 PROCEDURE — G8417 CALC BMI ABV UP PARAM F/U: HCPCS | Performed by: PHYSICIAN ASSISTANT

## 2024-05-14 SDOH — SOCIAL STABILITY - SOCIAL INSECURITY: DISSAPEARANCE AND DEATH OF FAMILY MEMBER: Z63.4

## 2024-05-20 ASSESSMENT — PATIENT HEALTH QUESTIONNAIRE - PHQ9
9. THOUGHTS THAT YOU WOULD BE BETTER OFF DEAD, OR OF HURTING YOURSELF: NOT AT ALL
SUM OF ALL RESPONSES TO PHQ9 QUESTIONS 1 & 2: 3
10. IF YOU CHECKED OFF ANY PROBLEMS, HOW DIFFICULT HAVE THESE PROBLEMS MADE IT FOR YOU TO DO YOUR WORK, TAKE CARE OF THINGS AT HOME, OR GET ALONG WITH OTHER PEOPLE: VERY DIFFICULT
SUM OF ALL RESPONSES TO PHQ QUESTIONS 1-9: 6
8. MOVING OR SPEAKING SO SLOWLY THAT OTHER PEOPLE COULD HAVE NOTICED. OR THE OPPOSITE, BEING SO FIGETY OR RESTLESS THAT YOU HAVE BEEN MOVING AROUND A LOT MORE THAN USUAL: NOT AT ALL
SUM OF ALL RESPONSES TO PHQ QUESTIONS 1-9: 6
6. FEELING BAD ABOUT YOURSELF - OR THAT YOU ARE A FAILURE OR HAVE LET YOURSELF OR YOUR FAMILY DOWN: NOT AT ALL
4. FEELING TIRED OR HAVING LITTLE ENERGY: SEVERAL DAYS
1. LITTLE INTEREST OR PLEASURE IN DOING THINGS: SEVERAL DAYS
SUM OF ALL RESPONSES TO PHQ QUESTIONS 1-9: 6
2. FEELING DOWN, DEPRESSED OR HOPELESS: MORE THAN HALF THE DAYS
7. TROUBLE CONCENTRATING ON THINGS, SUCH AS READING THE NEWSPAPER OR WATCHING TELEVISION: SEVERAL DAYS
SUM OF ALL RESPONSES TO PHQ QUESTIONS 1-9: 6
5. POOR APPETITE OR OVEREATING: NOT AT ALL
3. TROUBLE FALLING OR STAYING ASLEEP: SEVERAL DAYS

## 2024-05-20 NOTE — PROGRESS NOTES
performing a medically appropriate examination, completing clinical documentation, and on counseling/ eductaing the patient and the family.      Please note Nuance Dragon PowerMic III software used for dictation of note,  which may contain minor errors due to ambient noise and indiscriminate speech pickup.

## 2024-05-21 DIAGNOSIS — F41.9 ANXIETY: ICD-10-CM

## 2024-05-22 RX ORDER — CLONAZEPAM 0.5 MG/1
0.5 TABLET ORAL SEE ADMIN INSTRUCTIONS
Qty: 42 TABLET | Refills: 0 | Status: SHIPPED | OUTPATIENT
Start: 2024-05-22 | End: 2024-06-05

## 2024-05-28 ENCOUNTER — OFFICE VISIT (OUTPATIENT)
Dept: GERIATRIC MEDICINE | Age: 89
End: 2024-05-28
Payer: MEDICARE

## 2024-05-28 DIAGNOSIS — F32.9 REACTIVE DEPRESSION: ICD-10-CM

## 2024-05-28 DIAGNOSIS — F41.1 GAD (GENERALIZED ANXIETY DISORDER): Primary | ICD-10-CM

## 2024-05-28 PROCEDURE — G8417 CALC BMI ABV UP PARAM F/U: HCPCS | Performed by: PHYSICIAN ASSISTANT

## 2024-05-28 PROCEDURE — 99349 HOME/RES VST EST MOD MDM 40: CPT | Performed by: PHYSICIAN ASSISTANT

## 2024-05-28 PROCEDURE — 1123F ACP DISCUSS/DSCN MKR DOCD: CPT | Performed by: PHYSICIAN ASSISTANT

## 2024-05-28 PROCEDURE — 1036F TOBACCO NON-USER: CPT | Performed by: PHYSICIAN ASSISTANT

## 2024-05-28 PROCEDURE — 1090F PRES/ABSN URINE INCON ASSESS: CPT | Performed by: PHYSICIAN ASSISTANT

## 2024-05-30 DIAGNOSIS — F41.9 ANXIETY: ICD-10-CM

## 2024-05-30 RX ORDER — CLONAZEPAM 0.5 MG/1
0.5 TABLET ORAL SEE ADMIN INSTRUCTIONS
Qty: 90 TABLET | Refills: 0 | Status: SHIPPED | OUTPATIENT
Start: 2024-05-30 | End: 2024-06-13

## 2024-06-03 NOTE — PROGRESS NOTES
ACHY JOINTS      Sulfa Antibiotics      SWELLING    Tetracyclines & Related      HIVES    Thiazide-Type Diuretics      itch    Esomeprazole Magnesium      sore throat, red lips and sore tongue           Review of Systems   Constitutional:  Negative for activity change and appetite change.   Cardiovascular:  Positive for leg swelling.   Genitourinary:  Negative for dysuria.   Musculoskeletal:  Positive for arthralgias and gait problem.   Neurological:  Positive for weakness.   Psychiatric/Behavioral:  Positive for sleep disturbance. Negative for agitation, behavioral problems, confusion, decreased concentration, dysphoric mood, self-injury and suicidal ideas. The patient is not nervous/anxious.    All other systems reviewed and are negative.      Objective:   VS: See PCC. Reviewed.    Physical Exam  Vitals (See ZULEYKA) reviewed.   Constitutional:       General: She is not in acute distress.     Appearance: Normal appearance. She is well-developed. She is obese. She is not ill-appearing or diaphoretic.   HENT:      Head: Normocephalic and atraumatic.      Nose: Nose normal.      Mouth/Throat:      Mouth: Mucous membranes are moist.      Pharynx: Oropharynx is clear.   Neck:      Thyroid: No thyromegaly.      Vascular: No JVD.      Trachea: No tracheal deviation.   Cardiovascular:      Rate and Rhythm: Normal rate. Rhythm irregularly irregular.      Pulses: Decreased pulses.           Popliteal pulses are 1+ on the right side and 1+ on the left side.        Dorsalis pedis pulses are 1+ on the right side and 1+ on the left side.        Posterior tibial pulses are 1+ on the right side and 1+ on the left side.   Pulmonary:      Effort: Pulmonary effort is normal. No respiratory distress.      Breath sounds: Normal breath sounds.   Genitourinary:     Comments: Deferred  Musculoskeletal:         General: No tenderness or deformity.   Lymphadenopathy:      Cervical: No cervical adenopathy.   Skin:     General: Skin is warm

## 2024-06-04 ENCOUNTER — OFFICE VISIT (OUTPATIENT)
Dept: GERIATRIC MEDICINE | Age: 89
End: 2024-06-04
Payer: MEDICARE

## 2024-06-04 DIAGNOSIS — Z86.59 HISTORY OF SUICIDAL IDEATION: Primary | ICD-10-CM

## 2024-06-04 DIAGNOSIS — F33.0 MILD EPISODE OF RECURRENT MAJOR DEPRESSIVE DISORDER (HCC): ICD-10-CM

## 2024-06-04 DIAGNOSIS — R60.0 BILATERAL EDEMA OF LOWER EXTREMITY: ICD-10-CM

## 2024-06-04 DIAGNOSIS — F41.1 GAD (GENERALIZED ANXIETY DISORDER): ICD-10-CM

## 2024-06-04 PROCEDURE — 1036F TOBACCO NON-USER: CPT | Performed by: PHYSICIAN ASSISTANT

## 2024-06-04 PROCEDURE — G8417 CALC BMI ABV UP PARAM F/U: HCPCS | Performed by: PHYSICIAN ASSISTANT

## 2024-06-04 PROCEDURE — 99349 HOME/RES VST EST MOD MDM 40: CPT | Performed by: PHYSICIAN ASSISTANT

## 2024-06-04 PROCEDURE — 1123F ACP DISCUSS/DSCN MKR DOCD: CPT | Performed by: PHYSICIAN ASSISTANT

## 2024-06-04 PROCEDURE — 1090F PRES/ABSN URINE INCON ASSESS: CPT | Performed by: PHYSICIAN ASSISTANT

## 2024-06-19 NOTE — PROGRESS NOTES
Subjective:      Patient ID: Frances Meek is a pleasant 89 y.o. female who presents today for:  Chief Complaint   Patient presents with    Other     History of suicidal ideation  (primary encounter diagnosis)  Amanda (generalized anxiety disorder)  Mild episode of recurrent major depressive disorder (hcc)  Bilateral edema of lower extremity         [facility]    Following up for MDD and recent verbalization of suicidal ideation.  She has been monitored with 15-minute checks and as needed checks throughout past few days.  When brought up she states that she was \"just talking \"and had no real intention.  She has gone through a lot of distress over the past few months with her son passing away due to drug overdose (?).  Does have ongoing counseling sessions in place now.  She is with her niece today who states patient has no real intention of self-harm.  When asked today patient states negative intention as well, however did discuss ongoing monitoring due to patient [place].  Patient to continue on max dose Zoloft as well as Klonopin.  Additionally did discuss patient short course of Bumex versus Lasix, she states that she would prefer to be back on her regular Lasix which we will oblige and start today.  No new evident worsening of swelling to lower legs.      Patient Active Problem List   Diagnosis    Choroidal nevus of right eye    CKD (chronic kidney disease) stage 3, GFR 30-59 ml/min (HCC)    Diverticulosis    Esophageal reflux    Essential hypertension    AMANDA (generalized anxiety disorder)    HLD (hyperlipidemia)    Mild episode of recurrent major depressive disorder (HCC)    Nonintractable headache    Obesity, Class I, BMI 30-34.9    Primary osteoarthritis of right knee    Paroxysmal atrial fibrillation (HCC)    Psychophysiological insomnia    Vitamin D deficiency    Chronic combined systolic and diastolic heart failure (HCC)    Hypertensive heart disease with heart failure (HCC)    Long term

## 2024-07-05 ENCOUNTER — OFFICE VISIT (OUTPATIENT)
Dept: GERIATRIC MEDICINE | Age: 89
End: 2024-07-05
Payer: MEDICARE

## 2024-07-05 DIAGNOSIS — K21.9 GASTROESOPHAGEAL REFLUX DISEASE WITHOUT ESOPHAGITIS: ICD-10-CM

## 2024-07-05 DIAGNOSIS — I10 ESSENTIAL HYPERTENSION: Primary | ICD-10-CM

## 2024-07-05 PROCEDURE — 1123F ACP DISCUSS/DSCN MKR DOCD: CPT | Performed by: PHYSICIAN ASSISTANT

## 2024-07-05 PROCEDURE — G8417 CALC BMI ABV UP PARAM F/U: HCPCS | Performed by: PHYSICIAN ASSISTANT

## 2024-07-05 PROCEDURE — 99348 HOME/RES VST EST LOW MDM 30: CPT | Performed by: PHYSICIAN ASSISTANT

## 2024-07-05 PROCEDURE — 1090F PRES/ABSN URINE INCON ASSESS: CPT | Performed by: PHYSICIAN ASSISTANT

## 2024-07-05 PROCEDURE — 1036F TOBACCO NON-USER: CPT | Performed by: PHYSICIAN ASSISTANT

## 2024-07-09 DIAGNOSIS — F41.9 ANXIETY: ICD-10-CM

## 2024-07-10 RX ORDER — CLONAZEPAM 0.5 MG/1
TABLET ORAL
Qty: 90 TABLET | Refills: 0 | Status: SHIPPED | OUTPATIENT
Start: 2024-07-10 | End: 2024-09-11

## 2024-07-16 ENCOUNTER — OFFICE VISIT (OUTPATIENT)
Dept: GERIATRIC MEDICINE | Age: 89
End: 2024-07-16
Payer: MEDICARE

## 2024-07-16 DIAGNOSIS — Z53.20 MEDICATION REFUSED: ICD-10-CM

## 2024-07-16 DIAGNOSIS — R19.7 DIARRHEA, UNSPECIFIED TYPE: Primary | ICD-10-CM

## 2024-07-16 PROCEDURE — 99348 HOME/RES VST EST LOW MDM 30: CPT | Performed by: PHYSICIAN ASSISTANT

## 2024-07-16 PROCEDURE — 1123F ACP DISCUSS/DSCN MKR DOCD: CPT | Performed by: PHYSICIAN ASSISTANT

## 2024-07-16 PROCEDURE — 1036F TOBACCO NON-USER: CPT | Performed by: PHYSICIAN ASSISTANT

## 2024-07-16 PROCEDURE — 1090F PRES/ABSN URINE INCON ASSESS: CPT | Performed by: PHYSICIAN ASSISTANT

## 2024-07-16 PROCEDURE — G8417 CALC BMI ABV UP PARAM F/U: HCPCS | Performed by: PHYSICIAN ASSISTANT

## 2024-07-25 DIAGNOSIS — F41.9 ANXIETY: ICD-10-CM

## 2024-07-25 RX ORDER — CLONAZEPAM 0.5 MG/1
0.5 TABLET ORAL SEE ADMIN INSTRUCTIONS
Qty: 90 TABLET | Refills: 0 | Status: SHIPPED | OUTPATIENT
Start: 2024-07-25 | End: 2024-08-24

## 2024-07-30 ENCOUNTER — OFFICE VISIT (OUTPATIENT)
Dept: GERIATRIC MEDICINE | Age: 89
End: 2024-07-30

## 2024-07-30 DIAGNOSIS — F33.9 RECURRENT MAJOR DEPRESSIVE DISORDER, REMISSION STATUS UNSPECIFIED (HCC): Primary | ICD-10-CM

## 2024-07-30 DIAGNOSIS — F32.A ANXIETY AND DEPRESSION: ICD-10-CM

## 2024-07-30 DIAGNOSIS — F41.9 ANXIETY AND DEPRESSION: ICD-10-CM

## 2024-07-30 ASSESSMENT — ENCOUNTER SYMPTOMS
ABDOMINAL DISTENTION: 0
DIARRHEA: 1

## 2024-07-31 NOTE — PROGRESS NOTES
patient, face-to-face patient care, performing a medically appropriate examination, completing clinical documentation, and on counseling/ eductaing the patient and the family.      Please note Nuance Dragon PowerMic III software used for dictation of note,  which may contain minor errors due to ambient noise and indiscriminate speech pickup.

## 2024-08-02 ENCOUNTER — OFFICE VISIT (OUTPATIENT)
Dept: GERIATRIC MEDICINE | Age: 89
End: 2024-08-02
Payer: MEDICARE

## 2024-08-02 DIAGNOSIS — S99.921A RIGHT FOOT INJURY, INITIAL ENCOUNTER: ICD-10-CM

## 2024-08-02 DIAGNOSIS — R60.0 BILATERAL EDEMA OF LOWER EXTREMITY: ICD-10-CM

## 2024-08-02 DIAGNOSIS — L03.115 CELLULITIS OF RIGHT FOOT: Primary | ICD-10-CM

## 2024-08-02 PROCEDURE — G8417 CALC BMI ABV UP PARAM F/U: HCPCS | Performed by: PHYSICIAN ASSISTANT

## 2024-08-02 PROCEDURE — 99349 HOME/RES VST EST MOD MDM 40: CPT | Performed by: PHYSICIAN ASSISTANT

## 2024-08-02 PROCEDURE — 1090F PRES/ABSN URINE INCON ASSESS: CPT | Performed by: PHYSICIAN ASSISTANT

## 2024-08-02 PROCEDURE — 1036F TOBACCO NON-USER: CPT | Performed by: PHYSICIAN ASSISTANT

## 2024-08-02 PROCEDURE — 1123F ACP DISCUSS/DSCN MKR DOCD: CPT | Performed by: PHYSICIAN ASSISTANT

## 2024-08-06 ENCOUNTER — OFFICE VISIT (OUTPATIENT)
Dept: GERIATRIC MEDICINE | Age: 89
End: 2024-08-06
Payer: MEDICARE

## 2024-08-06 DIAGNOSIS — M79.671 RIGHT FOOT PAIN: ICD-10-CM

## 2024-08-06 DIAGNOSIS — R22.41 LOCALIZED SWELLING OF RIGHT FOOT: Primary | ICD-10-CM

## 2024-08-06 PROCEDURE — 1036F TOBACCO NON-USER: CPT | Performed by: PHYSICIAN ASSISTANT

## 2024-08-06 PROCEDURE — 99348 HOME/RES VST EST LOW MDM 30: CPT | Performed by: PHYSICIAN ASSISTANT

## 2024-08-06 PROCEDURE — 1090F PRES/ABSN URINE INCON ASSESS: CPT | Performed by: PHYSICIAN ASSISTANT

## 2024-08-06 PROCEDURE — G8417 CALC BMI ABV UP PARAM F/U: HCPCS | Performed by: PHYSICIAN ASSISTANT

## 2024-08-06 PROCEDURE — 1123F ACP DISCUSS/DSCN MKR DOCD: CPT | Performed by: PHYSICIAN ASSISTANT

## 2024-08-13 ENCOUNTER — OFFICE VISIT (OUTPATIENT)
Dept: GERIATRIC MEDICINE | Age: 89
End: 2024-08-13
Payer: MEDICARE

## 2024-08-13 DIAGNOSIS — L02.611 TOE ABSCESS, RIGHT: Primary | ICD-10-CM

## 2024-08-13 PROCEDURE — 1123F ACP DISCUSS/DSCN MKR DOCD: CPT | Performed by: PHYSICIAN ASSISTANT

## 2024-08-13 PROCEDURE — 1036F TOBACCO NON-USER: CPT | Performed by: PHYSICIAN ASSISTANT

## 2024-08-13 PROCEDURE — 99348 HOME/RES VST EST LOW MDM 30: CPT | Performed by: PHYSICIAN ASSISTANT

## 2024-08-13 PROCEDURE — 1090F PRES/ABSN URINE INCON ASSESS: CPT | Performed by: PHYSICIAN ASSISTANT

## 2024-08-13 PROCEDURE — G8417 CALC BMI ABV UP PARAM F/U: HCPCS | Performed by: PHYSICIAN ASSISTANT

## 2024-08-16 NOTE — PROGRESS NOTES
Subjective:      Patient ID: Frances Meek is a pleasant 89 y.o. female who presents today for:  Chief Complaint   Patient presents with    Other     Recurrent major depressive disorder, remission status unspecified (hcc)  (primary encounter diagnosis)  Anxiety and depression         [facility]    Patient seen today for concern by patient and family member of chronic depression/anxiety.  She is having no new behaviors, but some mild agitation and dysthymia.  Does not appear to be interfering with ADLs at this point in time, but family wanting to get ahead of it requesting Review of medications. Patient currently on clonazepam 0.5 mg p.o. twice daily, sertraline 200 mg p.o. daily as well.  Will have psychiatric service, to review medications and assess for further need and or medication change.      Patient Active Problem List   Diagnosis    Choroidal nevus of right eye    CKD (chronic kidney disease) stage 3, GFR 30-59 ml/min (HCC)    Diverticulosis    Esophageal reflux    Essential hypertension    AMANDA (generalized anxiety disorder)    HLD (hyperlipidemia)    Mild episode of recurrent major depressive disorder (HCC)    Nonintractable headache    Obesity, Class I, BMI 30-34.9    Primary osteoarthritis of right knee    Paroxysmal atrial fibrillation (HCC)    Psychophysiological insomnia    Vitamin D deficiency    Chronic combined systolic and diastolic heart failure (HCC)    Hypertensive heart disease with heart failure (HCC)    Long term (current) use of anticoagulants    Thrombocytopenia, unspecified (HCC)    Acute diastolic (congestive) heart failure (HCC)    Valvular heart disease     Past Medical History:   Diagnosis Date    Atrial fibrillation (HCC)      No past surgical history on file.  Social History     Socioeconomic History    Marital status:      Spouse name: Not on file    Number of children: Not on file    Years of education: Not on file    Highest education level: Not on

## 2024-08-20 ASSESSMENT — ENCOUNTER SYMPTOMS: COLOR CHANGE: 1

## 2024-08-21 NOTE — PROGRESS NOTES
follow-ups on file.      ARIELLE Starks    Electronically signed by: ARIELLE Starks on 8/20/2024    Please note orders entered on site at facility after discussion with appropriate facility nursing/therapy/ / nutritional staff. Current longstanding medical problems and acute medical issues addressed with staff. Available data and data elements in on site paper chart reviewed and analyzed.  Current external consultant notes reviewed in on site chart. Ordered laboratory testing and imaging will be reviewed when available.     Side effects, adverse effects of the medication prescribed today, as well as treatment plan and result expectations have been discussed withthe patient who expresses understanding and desires to proceed.    I spent a total of 40 minutes on the date of service which included preparing to see the patient, face-to-face patient care, performing a medically appropriate examination, completing clinical documentation, and on counseling/ eductaing the patient and the family.      Please note Nuance Dragon PowerMic III software used for dictation of note,  which may contain minor errors due to ambient noise and indiscriminate speech pickup.

## 2024-08-23 ENCOUNTER — OFFICE VISIT (OUTPATIENT)
Dept: GERIATRIC MEDICINE | Age: 89
End: 2024-08-23
Payer: MEDICARE

## 2024-08-23 DIAGNOSIS — L02.611 ABSCESS OF RIGHT FOOT: Primary | ICD-10-CM

## 2024-08-23 PROCEDURE — 1123F ACP DISCUSS/DSCN MKR DOCD: CPT | Performed by: PHYSICIAN ASSISTANT

## 2024-08-23 PROCEDURE — 1090F PRES/ABSN URINE INCON ASSESS: CPT | Performed by: PHYSICIAN ASSISTANT

## 2024-08-23 PROCEDURE — G8417 CALC BMI ABV UP PARAM F/U: HCPCS | Performed by: PHYSICIAN ASSISTANT

## 2024-08-23 PROCEDURE — 99348 HOME/RES VST EST LOW MDM 30: CPT | Performed by: PHYSICIAN ASSISTANT

## 2024-08-23 PROCEDURE — 1036F TOBACCO NON-USER: CPT | Performed by: PHYSICIAN ASSISTANT

## 2024-08-23 ASSESSMENT — ENCOUNTER SYMPTOMS: COLOR CHANGE: 1

## 2024-08-24 NOTE — PROGRESS NOTES
Subjective:      Patient ID: Frances Meek is a pleasant 89 y.o. female who presents today for:  No chief complaint on file.      [facility]    Follow-up on lower leg cellulitis of right foot/leg.  Does have some minor swelling still, bruising is all but gone at this time.  Some minor tenderness when walking.  Concern for possible fracture, although patient denies knowing any moment in the past 1-2 weeks where she had any acute trauma to the area.  Plan to monitor via 2 view x-ray to right foot.  Follow-up with results.  No new changes otherwise.      Patient Active Problem List   Diagnosis    Choroidal nevus of right eye    CKD (chronic kidney disease) stage 3, GFR 30-59 ml/min (HCC)    Diverticulosis    Esophageal reflux    Essential hypertension    AMANDA (generalized anxiety disorder)    HLD (hyperlipidemia)    Mild episode of recurrent major depressive disorder (HCC)    Nonintractable headache    Obesity, Class I, BMI 30-34.9    Primary osteoarthritis of right knee    Paroxysmal atrial fibrillation (HCC)    Psychophysiological insomnia    Vitamin D deficiency    Chronic combined systolic and diastolic heart failure (HCC)    Hypertensive heart disease with heart failure (HCC)    Long term (current) use of anticoagulants    Thrombocytopenia, unspecified (HCC)    Acute diastolic (congestive) heart failure (HCC)    Valvular heart disease     Past Medical History:   Diagnosis Date    Atrial fibrillation (HCC)      No past surgical history on file.  Social History     Socioeconomic History    Marital status:      Spouse name: Not on file    Number of children: Not on file    Years of education: Not on file    Highest education level: Not on file   Occupational History    Not on file   Tobacco Use    Smoking status: Never     Passive exposure: Never    Smokeless tobacco: Never   Substance and Sexual Activity    Alcohol use: Not Currently    Drug use: Never    Sexual activity: Not Currently

## 2024-08-26 DIAGNOSIS — F41.9 ANXIETY: ICD-10-CM

## 2024-08-26 RX ORDER — CLONAZEPAM 0.5 MG/1
0.5 TABLET ORAL SEE ADMIN INSTRUCTIONS
Qty: 90 TABLET | Refills: 0 | Status: ON HOLD | OUTPATIENT
Start: 2024-08-26 | End: 2024-08-30

## 2024-08-27 ENCOUNTER — OFFICE VISIT (OUTPATIENT)
Dept: GERIATRIC MEDICINE | Age: 89
End: 2024-08-27
Payer: MEDICARE

## 2024-08-27 DIAGNOSIS — L02.612 ABSCESS OF LEFT FOOT EXCLUDING TOES: Primary | ICD-10-CM

## 2024-08-27 PROCEDURE — 1036F TOBACCO NON-USER: CPT | Performed by: PHYSICIAN ASSISTANT

## 2024-08-27 PROCEDURE — 99348 HOME/RES VST EST LOW MDM 30: CPT | Performed by: PHYSICIAN ASSISTANT

## 2024-08-27 PROCEDURE — 1123F ACP DISCUSS/DSCN MKR DOCD: CPT | Performed by: PHYSICIAN ASSISTANT

## 2024-08-27 PROCEDURE — G8417 CALC BMI ABV UP PARAM F/U: HCPCS | Performed by: PHYSICIAN ASSISTANT

## 2024-08-27 PROCEDURE — 1090F PRES/ABSN URINE INCON ASSESS: CPT | Performed by: PHYSICIAN ASSISTANT

## 2024-08-29 ASSESSMENT — ENCOUNTER SYMPTOMS: COLOR CHANGE: 0

## 2024-08-29 NOTE — PROGRESS NOTES
Subjective:      Patient ID: Frances Meek is a pleasant 89 y.o. female who presents today for:  Chief Complaint   Patient presents with    Other     Toe abscess, right  (primary encounter diagnosis)         [facility]    Following up on patient's right lower leg/foot.  No evidence of new cellulitis.  Some minor pain, however patient states it is better compared to initial presentation.  Did do foot x-ray (negative).  Plan on doing CBC and uric acid level as appears to be minor abscess.  Will plan on drawing Wednesday.  Otherwise patient has been walking on it without any worsening pain, slowly going away.  There is a small approximately quarter sized \"knot \"noted at medial proximal PIP.  Not interfering with ADLs at this time.  Will continue to monitor follow-up as needed.  Will await lab results and treat as indicated.      Patient Active Problem List   Diagnosis    Choroidal nevus of right eye    CKD (chronic kidney disease) stage 3, GFR 30-59 ml/min (HCC)    Diverticulosis    Esophageal reflux    Essential hypertension    AMANDA (generalized anxiety disorder)    HLD (hyperlipidemia)    Mild episode of recurrent major depressive disorder (HCC)    Nonintractable headache    Obesity, Class I, BMI 30-34.9    Primary osteoarthritis of right knee    Paroxysmal atrial fibrillation (HCC)    Psychophysiological insomnia    Vitamin D deficiency    Chronic combined systolic and diastolic heart failure (HCC)    Hypertensive heart disease with heart failure (HCC)    Long term (current) use of anticoagulants    Thrombocytopenia, unspecified (HCC)    Acute diastolic (congestive) heart failure (HCC)    Valvular heart disease     Past Medical History:   Diagnosis Date    Atrial fibrillation (HCC)      No past surgical history on file.  Social History     Socioeconomic History    Marital status:      Spouse name: Not on file    Number of children: Not on file    Years of education: Not on file    Highest  understanding and desires to proceed.    I spent a total of 30 minutes on the date of service which included preparing to see the patient, face-to-face patient care, performing a medically appropriate examination, completing clinical documentation, and on counseling/ eductaing the patient and the family.      Please note Nuance Dragon PowerMic III software used for dictation of note,  which may contain minor errors due to ambient noise and indiscriminate speech pickup.

## 2024-08-30 ENCOUNTER — APPOINTMENT (OUTPATIENT)
Dept: GENERAL RADIOLOGY | Age: 89
DRG: 291 | End: 2024-08-30
Payer: MEDICARE

## 2024-08-30 ENCOUNTER — HOSPITAL ENCOUNTER (INPATIENT)
Age: 89
LOS: 4 days | Discharge: HOME HEALTH | DRG: 291 | End: 2024-09-03
Attending: INTERNAL MEDICINE | Admitting: INTERNAL MEDICINE
Payer: MEDICARE

## 2024-08-30 DIAGNOSIS — R09.02 HYPOXEMIA: ICD-10-CM

## 2024-08-30 DIAGNOSIS — I50.9 ACUTE ON CHRONIC CONGESTIVE HEART FAILURE, UNSPECIFIED HEART FAILURE TYPE (HCC): Primary | ICD-10-CM

## 2024-08-30 PROBLEM — I50.43 CHF (CONGESTIVE HEART FAILURE), NYHA CLASS I, ACUTE ON CHRONIC, COMBINED (HCC): Status: ACTIVE | Noted: 2024-08-30

## 2024-08-30 LAB
ALBUMIN SERPL-MCNC: 4.3 G/DL (ref 3.5–4.6)
ALP SERPL-CCNC: 78 U/L (ref 40–130)
ALT SERPL-CCNC: 25 U/L (ref 0–33)
ANION GAP SERPL CALCULATED.3IONS-SCNC: 9 MEQ/L (ref 9–15)
APTT PPP: 32 SEC (ref 24.4–36.8)
AST SERPL-CCNC: 25 U/L (ref 0–35)
B PARAP IS1001 DNA NPH QL NAA+NON-PROBE: NOT DETECTED
B PERT.PT PRMT NPH QL NAA+NON-PROBE: NOT DETECTED
BACTERIA URNS QL MICRO: NEGATIVE /HPF
BASOPHILS # BLD: 0 K/UL (ref 0–0.2)
BASOPHILS NFR BLD: 0.4 %
BILIRUB SERPL-MCNC: 0.8 MG/DL (ref 0.2–0.7)
BILIRUB UR QL STRIP: NEGATIVE
BNP BLD-MCNC: NORMAL PG/ML
BUN SERPL-MCNC: 16 MG/DL (ref 8–23)
C PNEUM DNA NPH QL NAA+NON-PROBE: NOT DETECTED
CALCIUM SERPL-MCNC: 9.6 MG/DL (ref 8.5–9.9)
CHLORIDE SERPL-SCNC: 102 MEQ/L (ref 95–107)
CLARITY UR: CLEAR
CO2 SERPL-SCNC: 29 MEQ/L (ref 20–31)
COLOR UR: YELLOW
CREAT SERPL-MCNC: 1.12 MG/DL (ref 0.5–0.9)
EOSINOPHIL # BLD: 0.1 K/UL (ref 0–0.7)
EOSINOPHIL NFR BLD: 1.6 %
EPI CELLS #/AREA URNS AUTO: NORMAL /HPF (ref 0–5)
ERYTHROCYTE [DISTWIDTH] IN BLOOD BY AUTOMATED COUNT: 13.6 % (ref 11.5–14.5)
FLUAV RNA NPH QL NAA+NON-PROBE: NOT DETECTED
FLUBV RNA NPH QL NAA+NON-PROBE: NOT DETECTED
GLOBULIN SER CALC-MCNC: 2.9 G/DL (ref 2.3–3.5)
GLUCOSE SERPL-MCNC: 109 MG/DL (ref 70–99)
GLUCOSE UR STRIP-MCNC: NEGATIVE MG/DL
HADV DNA NPH QL NAA+NON-PROBE: NOT DETECTED
HCOV 229E RNA NPH QL NAA+NON-PROBE: NOT DETECTED
HCOV HKU1 RNA NPH QL NAA+NON-PROBE: NOT DETECTED
HCOV NL63 RNA NPH QL NAA+NON-PROBE: NOT DETECTED
HCOV OC43 RNA NPH QL NAA+NON-PROBE: NOT DETECTED
HCT VFR BLD AUTO: 41.3 % (ref 37–47)
HGB BLD-MCNC: 14 G/DL (ref 12–16)
HGB UR QL STRIP: NEGATIVE
HMPV RNA NPH QL NAA+NON-PROBE: NOT DETECTED
HPIV1 RNA NPH QL NAA+NON-PROBE: NOT DETECTED
HPIV2 RNA NPH QL NAA+NON-PROBE: NOT DETECTED
HPIV3 RNA NPH QL NAA+NON-PROBE: NOT DETECTED
HPIV4 RNA NPH QL NAA+NON-PROBE: NOT DETECTED
HYALINE CASTS #/AREA URNS AUTO: NORMAL /HPF (ref 0–5)
INR PPP: 1.2
KETONES UR STRIP-MCNC: NEGATIVE MG/DL
LACTATE BLDV-SCNC: 1.1 MMOL/L (ref 0.5–2.2)
LEUKOCYTE ESTERASE UR QL STRIP: NEGATIVE
LYMPHOCYTES # BLD: 0.9 K/UL (ref 1–4.8)
LYMPHOCYTES NFR BLD: 12.6 %
M PNEUMO DNA NPH QL NAA+NON-PROBE: NOT DETECTED
MAGNESIUM SERPL-MCNC: 2.2 MG/DL (ref 1.7–2.4)
MCH RBC QN AUTO: 33.4 PG (ref 27–31.3)
MCHC RBC AUTO-ENTMCNC: 33.9 % (ref 33–37)
MCV RBC AUTO: 98.6 FL (ref 79.4–94.8)
MONOCYTES # BLD: 0.6 K/UL (ref 0.2–0.8)
MONOCYTES NFR BLD: 7.7 %
NEUTROPHILS # BLD: 5.8 K/UL (ref 1.4–6.5)
NEUTS SEG NFR BLD: 77.4 %
NITRITE UR QL STRIP: POSITIVE
PH UR STRIP: 7 [PH] (ref 5–9)
PLATELET # BLD AUTO: 153 K/UL (ref 130–400)
POTASSIUM SERPL-SCNC: 4.3 MEQ/L (ref 3.4–4.9)
PROCALCITONIN SERPL IA-MCNC: 0.03 NG/ML (ref 0–0.15)
PROT SERPL-MCNC: 7.2 G/DL (ref 6.3–8)
PROT UR STRIP-MCNC: NEGATIVE MG/DL
PROTHROMBIN TIME: 15.3 SEC (ref 12.3–14.9)
RBC # BLD AUTO: 4.19 M/UL (ref 4.2–5.4)
RBC #/AREA URNS AUTO: NORMAL /HPF (ref 0–5)
RSV RNA NPH QL NAA+NON-PROBE: NOT DETECTED
RV+EV RNA NPH QL NAA+NON-PROBE: NOT DETECTED
SARS-COV-2 RNA NPH QL NAA+NON-PROBE: NOT DETECTED
SODIUM SERPL-SCNC: 140 MEQ/L (ref 135–144)
SP GR UR STRIP: 1.01 (ref 1–1.03)
TROPONIN, HIGH SENSITIVITY: 19 NG/L (ref 0–19)
TROPONIN, HIGH SENSITIVITY: 20 NG/L (ref 0–19)
URINE REFLEX TO CULTURE: ABNORMAL
UROBILINOGEN UR STRIP-ACNC: 0.2 E.U./DL
WBC # BLD AUTO: 7.5 K/UL (ref 4.8–10.8)
WBC #/AREA URNS AUTO: NORMAL /HPF (ref 0–5)

## 2024-08-30 PROCEDURE — 6360000002 HC RX W HCPCS: Performed by: INTERNAL MEDICINE

## 2024-08-30 PROCEDURE — 71045 X-RAY EXAM CHEST 1 VIEW: CPT

## 2024-08-30 PROCEDURE — 85610 PROTHROMBIN TIME: CPT

## 2024-08-30 PROCEDURE — 2060000000 HC ICU INTERMEDIATE R&B

## 2024-08-30 PROCEDURE — 81001 URINALYSIS AUTO W/SCOPE: CPT

## 2024-08-30 PROCEDURE — 80053 COMPREHEN METABOLIC PANEL: CPT

## 2024-08-30 PROCEDURE — 83735 ASSAY OF MAGNESIUM: CPT

## 2024-08-30 PROCEDURE — 36415 COLL VENOUS BLD VENIPUNCTURE: CPT

## 2024-08-30 PROCEDURE — 96374 THER/PROPH/DIAG INJ IV PUSH: CPT

## 2024-08-30 PROCEDURE — 85730 THROMBOPLASTIN TIME PARTIAL: CPT

## 2024-08-30 PROCEDURE — 6360000002 HC RX W HCPCS

## 2024-08-30 PROCEDURE — 83605 ASSAY OF LACTIC ACID: CPT

## 2024-08-30 PROCEDURE — 84484 ASSAY OF TROPONIN QUANT: CPT

## 2024-08-30 PROCEDURE — 99223 1ST HOSP IP/OBS HIGH 75: CPT | Performed by: INTERNAL MEDICINE

## 2024-08-30 PROCEDURE — 0202U NFCT DS 22 TRGT SARS-COV-2: CPT

## 2024-08-30 PROCEDURE — 83880 ASSAY OF NATRIURETIC PEPTIDE: CPT

## 2024-08-30 PROCEDURE — 85025 COMPLETE CBC W/AUTO DIFF WBC: CPT

## 2024-08-30 PROCEDURE — 6370000000 HC RX 637 (ALT 250 FOR IP): Performed by: INTERNAL MEDICINE

## 2024-08-30 PROCEDURE — 84145 PROCALCITONIN (PCT): CPT

## 2024-08-30 PROCEDURE — 99285 EMERGENCY DEPT VISIT HI MDM: CPT

## 2024-08-30 PROCEDURE — 93005 ELECTROCARDIOGRAM TRACING: CPT

## 2024-08-30 PROCEDURE — 2580000003 HC RX 258: Performed by: INTERNAL MEDICINE

## 2024-08-30 RX ORDER — ACETAMINOPHEN 325 MG/1
650 TABLET ORAL EVERY 6 HOURS PRN
Status: DISCONTINUED | OUTPATIENT
Start: 2024-08-30 | End: 2024-09-03 | Stop reason: HOSPADM

## 2024-08-30 RX ORDER — LOSARTAN POTASSIUM 25 MG/1
25 TABLET ORAL DAILY
Status: ON HOLD | COMMUNITY
End: 2024-09-02 | Stop reason: HOSPADM

## 2024-08-30 RX ORDER — ONDANSETRON 2 MG/ML
4 INJECTION INTRAMUSCULAR; INTRAVENOUS EVERY 6 HOURS PRN
Status: DISCONTINUED | OUTPATIENT
Start: 2024-08-30 | End: 2024-09-03 | Stop reason: HOSPADM

## 2024-08-30 RX ORDER — SERTRALINE HYDROCHLORIDE 100 MG/1
100 TABLET, FILM COATED ORAL DAILY
Status: DISCONTINUED | OUTPATIENT
Start: 2024-08-30 | End: 2024-09-03 | Stop reason: HOSPADM

## 2024-08-30 RX ORDER — METOPROLOL TARTRATE 50 MG
50 TABLET ORAL 2 TIMES DAILY
Status: ON HOLD | COMMUNITY
End: 2024-08-30

## 2024-08-30 RX ORDER — ACETAMINOPHEN 650 MG/1
650 SUPPOSITORY RECTAL EVERY 6 HOURS PRN
Status: DISCONTINUED | OUTPATIENT
Start: 2024-08-30 | End: 2024-09-03 | Stop reason: HOSPADM

## 2024-08-30 RX ORDER — POTASSIUM CHLORIDE 7.45 MG/ML
10 INJECTION INTRAVENOUS PRN
Status: DISCONTINUED | OUTPATIENT
Start: 2024-08-30 | End: 2024-09-03 | Stop reason: HOSPADM

## 2024-08-30 RX ORDER — SODIUM CHLORIDE 9 MG/ML
INJECTION, SOLUTION INTRAVENOUS PRN
Status: DISCONTINUED | OUTPATIENT
Start: 2024-08-30 | End: 2024-09-03 | Stop reason: HOSPADM

## 2024-08-30 RX ORDER — LOSARTAN POTASSIUM 50 MG/1
50 TABLET ORAL 2 TIMES DAILY
Status: DISCONTINUED | OUTPATIENT
Start: 2024-08-30 | End: 2024-09-03 | Stop reason: HOSPADM

## 2024-08-30 RX ORDER — CLONAZEPAM 0.5 MG/1
0.5 TABLET ORAL 2 TIMES DAILY
Status: ON HOLD | COMMUNITY

## 2024-08-30 RX ORDER — ENOXAPARIN SODIUM 100 MG/ML
1 INJECTION SUBCUTANEOUS 2 TIMES DAILY
Status: DISCONTINUED | OUTPATIENT
Start: 2024-08-30 | End: 2024-08-31

## 2024-08-30 RX ORDER — METOPROLOL SUCCINATE 100 MG/1
100 TABLET, EXTENDED RELEASE ORAL DAILY
Status: DISCONTINUED | OUTPATIENT
Start: 2024-08-30 | End: 2024-09-03 | Stop reason: HOSPADM

## 2024-08-30 RX ORDER — HYDRALAZINE HYDROCHLORIDE 20 MG/ML
10 INJECTION INTRAMUSCULAR; INTRAVENOUS EVERY 6 HOURS PRN
Status: DISCONTINUED | OUTPATIENT
Start: 2024-08-30 | End: 2024-09-03 | Stop reason: HOSPADM

## 2024-08-30 RX ORDER — SODIUM CHLORIDE 0.9 % (FLUSH) 0.9 %
5-40 SYRINGE (ML) INJECTION EVERY 12 HOURS SCHEDULED
Status: DISCONTINUED | OUTPATIENT
Start: 2024-08-30 | End: 2024-09-03 | Stop reason: HOSPADM

## 2024-08-30 RX ORDER — SODIUM CHLORIDE 0.9 % (FLUSH) 0.9 %
5-40 SYRINGE (ML) INJECTION PRN
Status: DISCONTINUED | OUTPATIENT
Start: 2024-08-30 | End: 2024-09-03 | Stop reason: HOSPADM

## 2024-08-30 RX ORDER — ONDANSETRON 4 MG/1
4 TABLET, ORALLY DISINTEGRATING ORAL EVERY 8 HOURS PRN
Status: DISCONTINUED | OUTPATIENT
Start: 2024-08-30 | End: 2024-09-03 | Stop reason: HOSPADM

## 2024-08-30 RX ORDER — POTASSIUM CHLORIDE 1500 MG/1
40 TABLET, EXTENDED RELEASE ORAL PRN
Status: DISCONTINUED | OUTPATIENT
Start: 2024-08-30 | End: 2024-09-03 | Stop reason: HOSPADM

## 2024-08-30 RX ORDER — FUROSEMIDE 10 MG/ML
40 INJECTION INTRAMUSCULAR; INTRAVENOUS 2 TIMES DAILY
Status: DISCONTINUED | OUTPATIENT
Start: 2024-08-30 | End: 2024-08-30 | Stop reason: SDUPTHER

## 2024-08-30 RX ORDER — SENNA AND DOCUSATE SODIUM 50; 8.6 MG/1; MG/1
1 TABLET, FILM COATED ORAL DAILY PRN
Status: DISCONTINUED | OUTPATIENT
Start: 2024-08-30 | End: 2024-09-03 | Stop reason: HOSPADM

## 2024-08-30 RX ORDER — DILTIAZEM HYDROCHLORIDE 180 MG/1
180 CAPSULE, EXTENDED RELEASE ORAL DAILY
Status: ON HOLD | COMMUNITY
End: 2024-08-30

## 2024-08-30 RX ORDER — METOPROLOL TARTRATE 100 MG
100 TABLET ORAL 2 TIMES DAILY
Status: ON HOLD | COMMUNITY
End: 2024-09-02 | Stop reason: HOSPADM

## 2024-08-30 RX ORDER — HYDRALAZINE HYDROCHLORIDE 20 MG/ML
10 INJECTION INTRAMUSCULAR; INTRAVENOUS ONCE
Status: DISCONTINUED | OUTPATIENT
Start: 2024-08-30 | End: 2024-08-30

## 2024-08-30 RX ORDER — SERTRALINE HYDROCHLORIDE 100 MG/1
200 TABLET, FILM COATED ORAL DAILY
Status: ON HOLD | COMMUNITY

## 2024-08-30 RX ORDER — CLONAZEPAM 0.5 MG/1
0.5 TABLET ORAL 2 TIMES DAILY
Status: DISCONTINUED | OUTPATIENT
Start: 2024-08-30 | End: 2024-09-03 | Stop reason: HOSPADM

## 2024-08-30 RX ORDER — BUPROPION HYDROCHLORIDE 150 MG/1
150 TABLET, EXTENDED RELEASE ORAL DAILY
Status: DISCONTINUED | OUTPATIENT
Start: 2024-08-30 | End: 2024-09-03 | Stop reason: HOSPADM

## 2024-08-30 RX ORDER — FUROSEMIDE 10 MG/ML
40 INJECTION INTRAMUSCULAR; INTRAVENOUS 2 TIMES DAILY
Status: DISCONTINUED | OUTPATIENT
Start: 2024-08-30 | End: 2024-09-01

## 2024-08-30 RX ORDER — VALSARTAN 80 MG/1
40 TABLET ORAL EVERY 12 HOURS SCHEDULED
Status: DISCONTINUED | OUTPATIENT
Start: 2024-08-30 | End: 2024-08-30

## 2024-08-30 RX ORDER — LEVOTHYROXINE SODIUM 25 UG/1
25 TABLET ORAL DAILY
Status: DISCONTINUED | OUTPATIENT
Start: 2024-08-30 | End: 2024-09-03 | Stop reason: HOSPADM

## 2024-08-30 RX ORDER — BUPROPION HYDROCHLORIDE 150 MG/1
150 TABLET ORAL EVERY MORNING
Status: ON HOLD | COMMUNITY

## 2024-08-30 RX ORDER — POLYETHYLENE GLYCOL 3350 17 G/17G
17 POWDER, FOR SOLUTION ORAL EVERY OTHER DAY
Status: ON HOLD | COMMUNITY

## 2024-08-30 RX ORDER — ENOXAPARIN SODIUM 100 MG/ML
40 INJECTION SUBCUTANEOUS DAILY
Status: DISCONTINUED | OUTPATIENT
Start: 2024-08-30 | End: 2024-08-30

## 2024-08-30 RX ORDER — POLYETHYLENE GLYCOL 3350 17 G/17G
17 POWDER, FOR SOLUTION ORAL DAILY PRN
Status: DISCONTINUED | OUTPATIENT
Start: 2024-08-30 | End: 2024-09-03 | Stop reason: HOSPADM

## 2024-08-30 RX ORDER — ACETAMINOPHEN 500 MG
500 TABLET ORAL EVERY 6 HOURS PRN
Status: DISCONTINUED | OUTPATIENT
Start: 2024-08-30 | End: 2024-09-03 | Stop reason: HOSPADM

## 2024-08-30 RX ORDER — MAGNESIUM SULFATE IN WATER 40 MG/ML
2000 INJECTION, SOLUTION INTRAVENOUS PRN
Status: DISCONTINUED | OUTPATIENT
Start: 2024-08-30 | End: 2024-09-03 | Stop reason: HOSPADM

## 2024-08-30 RX ORDER — FUROSEMIDE 10 MG/ML
60 INJECTION INTRAMUSCULAR; INTRAVENOUS ONCE
Status: COMPLETED | OUTPATIENT
Start: 2024-08-30 | End: 2024-08-30

## 2024-08-30 RX ORDER — DILTIAZEM HYDROCHLORIDE 240 MG/1
240 CAPSULE, EXTENDED RELEASE ORAL DAILY
Status: ON HOLD | COMMUNITY
End: 2024-09-02 | Stop reason: HOSPADM

## 2024-08-30 RX ADMIN — FUROSEMIDE 60 MG: 10 INJECTION, SOLUTION INTRAMUSCULAR; INTRAVENOUS at 08:26

## 2024-08-30 RX ADMIN — ENOXAPARIN SODIUM 80 MG: 100 INJECTION SUBCUTANEOUS at 22:27

## 2024-08-30 RX ADMIN — SERTRALINE HYDROCHLORIDE 75 MG: 50 TABLET ORAL at 12:49

## 2024-08-30 RX ADMIN — ENOXAPARIN SODIUM 80 MG: 100 INJECTION SUBCUTANEOUS at 12:51

## 2024-08-30 RX ADMIN — BUPROPION HYDROCHLORIDE 150 MG: 150 TABLET, FILM COATED, EXTENDED RELEASE ORAL at 22:27

## 2024-08-30 RX ADMIN — SERTRALINE 100 MG: 100 TABLET, FILM COATED ORAL at 12:48

## 2024-08-30 RX ADMIN — METOPROLOL SUCCINATE 100 MG: 100 TABLET, EXTENDED RELEASE ORAL at 12:49

## 2024-08-30 RX ADMIN — LOSARTAN POTASSIUM 50 MG: 50 TABLET, FILM COATED ORAL at 12:49

## 2024-08-30 RX ADMIN — Medication 10 ML: at 21:00

## 2024-08-30 RX ADMIN — ACETAMINOPHEN 500 MG: 500 TABLET ORAL at 12:47

## 2024-08-30 RX ADMIN — CLONAZEPAM 0.5 MG: 0.5 TABLET ORAL at 22:27

## 2024-08-30 RX ADMIN — FUROSEMIDE 40 MG: 10 INJECTION, SOLUTION INTRAMUSCULAR; INTRAVENOUS at 18:04

## 2024-08-30 RX ADMIN — FUROSEMIDE 40 MG: 10 INJECTION, SOLUTION INTRAMUSCULAR; INTRAVENOUS at 12:50

## 2024-08-30 RX ADMIN — LEVOTHYROXINE SODIUM 25 MCG: 0.03 TABLET ORAL at 12:50

## 2024-08-30 RX ADMIN — Medication 10 ML: at 12:51

## 2024-08-30 RX ADMIN — LOSARTAN POTASSIUM 50 MG: 50 TABLET, FILM COATED ORAL at 22:27

## 2024-08-30 RX ADMIN — CLONAZEPAM 0.5 MG: 0.5 TABLET ORAL at 12:50

## 2024-08-30 ASSESSMENT — PAIN DESCRIPTION - LOCATION: LOCATION: GENERALIZED;FINGER (COMMENT WHICH ONE)

## 2024-08-30 ASSESSMENT — PAIN DESCRIPTION - DESCRIPTORS: DESCRIPTORS: ACHING

## 2024-08-30 ASSESSMENT — PAIN SCALES - GENERAL: PAINLEVEL_OUTOF10: 2

## 2024-08-30 NOTE — H&P
Thrombocytopenia, unspecified ([facility]) D69.6    Acute diastolic (congestive) heart failure (HCC) I50.31    Valvular heart disease I38    CHF (congestive heart failure), NYHA class I, acute on chronic, combined (HCC) I50.43       EDGAR VILLA MD

## 2024-08-30 NOTE — FLOWSHEET NOTE
1115- Patient arrived to room 173 via stretcher from ER. Oriented to room and call light. Transferred to bed using slide board. Cleaned up at this time from large amount of incontinent urine. New pure wick placed and set to medium wall suction. New depends placed on patient. Shortness of breath noted with exertion. Lungs are diminished with expiratory wheezes noted bilaterally. SATS 98% on 3L NC. She is A/Ox3 but noted that she is forgetful and asks the same question a few times. It was reported per the ER nurse that she can be up with a 1 person assist, but gait not yet observed. Skin remains warm and pink but hyperpigmentation/discoloration noted to buttocks. #18g SL to left AC, flushed and patent. BP elevated at 180/110. She remains asymptomatic at this time. Will allow her to settle in the bed and will recheck. Daughter at bedside. Call light remains in reach.    1135- BP rechecked at 155/89. Admission completed.     1202- Perfect serve message sent to Dr Rand letting him know her medication list is completed.     1300- Patient and her daughter asking about her wellbutrin. Medication not present on her list from the assisted living facility. The daughter called and spoke with Radha who stated she would fax the patient's current list over to us.     1520- Perfect serve message sent to Dr Rand letting him know that her medication list is updated and correct.     1730- patient resting in bed at this time. Has no complaints or concerns. TV on. Call light remains in reach.     1845- Patient remains resting in bed at this time. No signs of any acute distress noted. Call light remains in reach.     Electronically signed by Fabby Zuniga RN on 8/30/2024 at 7:00 PM

## 2024-08-30 NOTE — CONSULTS
08/30/24  0729   AST 25   ALT 25   BILITOT 0.8*   ALKPHOS 78     No results for input(s): \"LIPASE\", \"AMYLASE\" in the last 72 hours.  Recent Labs     08/30/24  0729   INR 1.2     XR CHEST PORTABLE    Result Date: 8/30/2024  EXAMINATION: ONE XRAY VIEW OF THE CHEST 8/30/2024 7:57 am COMPARISON: 01/29/2024 HISTORY: ORDERING SYSTEM PROVIDED HISTORY: chest pain, SOB TECHNOLOGIST PROVIDED HISTORY: Reason for exam:->chest pain, SOB What reading provider will be dictating this exam?->CRC FINDINGS: Portable chest reveals heart to be enlarged.  Chronic interstitial changes seen throughout the lung fields bilaterally with small bilateral pleural effusions.  Degenerative changes seen within the spine.  No new focal parenchymal opacification present.     Cardiomegaly with stable chronic interstitial changes seen throughout the lung fields bilaterally with small bilateral pleural effusions.  No new focal parenchymal opacification present.       Active Hospital Problems    Diagnosis Date Noted    CHF (congestive heart failure), NYHA class I, acute on chronic, combined (HCC) [I50.43] 08/30/2024     Priority: Low         Impression/Plan:   Nausea - Etio ? - w/u in progress.  A/C Decompensated HFpEF related to uncontrolled AF and HTN - iv Lasix. Strcit I/Os. Daily Labs.   Right sided CP - non cardiac.  Persistent AF- rate control.  I am concerned about her frequent falls and Eliquis.  Will make further decisions prior to DC.   HTN - low salt diet. Adjust meds. Add BB and ARB. Add PRN iv Hydralazine  LVEF 50-55  Mild Md AS  Mod MR  Mod Severe PA HTN     Thank you for allowing us to participate in the care of this patient.     Will continue to follow.    Please call if questions or concerns arise.    Electronically signed by SADE RALPH MD on 8/30/2024 at 11:14 AM

## 2024-08-30 NOTE — ED PROVIDER NOTES
[facility] for evaluation of SOB. Patient is afebrile, hemodynamically stable, nontoxic. EKG shows atrial fibrillation with PVCs; , normal axis, normal intervals, ST abnormality.  In comparison to EKG performed on 01/29/2024, no significant change noted.  No STEMI.  Patient did have a period of hypoxia on arrival to ED at 86% on RA.  Patient was placed on 2 L O2 via nasal cannula and O2 saturation did improve to 96 to 99% on 2 L.  Labs remarkable for BNP 10304.  Patient given IV Lasix while in ED.  Respiratory panel unremarkable.  Urinalysis remarkable for positive nitrates.  Microscopic urinalysis unremarkable.    XRs as interpreted per radiology:    1.  Chest x-ray demonstrates cardiomegaly with stable chronic interstitial changes seen throughout the lung fields bilaterally with small bilateral pleural effusions.  No new focal parenchymal opacification present.    Patient reassessed; updated on results, findings, plan.  Discussed admission with patient given acute CHF exacerbation, hypoxia and patient is agreeable to admission.  Call placed to medicine.  Spoke with Dr. Rand (medicine) whom is agreeable to admission.  Patient admitted to medicine in stable condition.     Problems Addressed:  Acute on chronic congestive heart failure, unspecified heart failure type (HCC): acute illness or injury  Hypoxemia: acute illness or injury    Amount and/or Complexity of Data Reviewed  Labs: ordered.  Radiology: ordered.  ECG/medicine tests: ordered.    Risk  Prescription drug management.  Decision regarding hospitalization.      REASSESSMENT      CRITICAL CARE TIME   Total Critical Care time was 0 minutes, excluding separately reportable procedures.  There was a high probability of clinically significant/life threatening deterioration in the patient's condition which required my urgent intervention.      CONSULTS:  None    PROCEDURES:  Unless otherwise noted below, none     Procedures      FINAL IMPRESSION      1.

## 2024-08-31 LAB
ANION GAP SERPL CALCULATED.3IONS-SCNC: 11 MEQ/L (ref 9–15)
BUN SERPL-MCNC: 20 MG/DL (ref 8–23)
CALCIUM SERPL-MCNC: 9.2 MG/DL (ref 8.5–9.9)
CHLORIDE SERPL-SCNC: 102 MEQ/L (ref 95–107)
CHOLEST SERPL-MCNC: 206 MG/DL (ref 0–199)
CO2 SERPL-SCNC: 32 MEQ/L (ref 20–31)
CREAT SERPL-MCNC: 1.32 MG/DL (ref 0.5–0.9)
GLUCOSE SERPL-MCNC: 97 MG/DL (ref 70–99)
HDLC SERPL-MCNC: 41 MG/DL (ref 40–59)
LDLC SERPL CALC-MCNC: 140 MG/DL (ref 0–129)
MAGNESIUM SERPL-MCNC: 2.1 MG/DL (ref 1.7–2.4)
POTASSIUM SERPL-SCNC: 3.5 MEQ/L (ref 3.4–4.9)
SODIUM SERPL-SCNC: 145 MEQ/L (ref 135–144)
TRIGL SERPL-MCNC: 125 MG/DL (ref 0–150)
TROPONIN, HIGH SENSITIVITY: 26 NG/L (ref 0–19)

## 2024-08-31 PROCEDURE — 2700000000 HC OXYGEN THERAPY PER DAY

## 2024-08-31 PROCEDURE — 36415 COLL VENOUS BLD VENIPUNCTURE: CPT

## 2024-08-31 PROCEDURE — 80048 BASIC METABOLIC PNL TOTAL CA: CPT

## 2024-08-31 PROCEDURE — 2580000003 HC RX 258: Performed by: INTERNAL MEDICINE

## 2024-08-31 PROCEDURE — 80061 LIPID PANEL: CPT

## 2024-08-31 PROCEDURE — 6370000000 HC RX 637 (ALT 250 FOR IP): Performed by: INTERNAL MEDICINE

## 2024-08-31 PROCEDURE — 83735 ASSAY OF MAGNESIUM: CPT

## 2024-08-31 PROCEDURE — 84484 ASSAY OF TROPONIN QUANT: CPT

## 2024-08-31 PROCEDURE — 6360000002 HC RX W HCPCS: Performed by: INTERNAL MEDICINE

## 2024-08-31 PROCEDURE — 99233 SBSQ HOSP IP/OBS HIGH 50: CPT | Performed by: INTERNAL MEDICINE

## 2024-08-31 PROCEDURE — 2060000000 HC ICU INTERMEDIATE R&B

## 2024-08-31 RX ORDER — ASPIRIN 81 MG/1
81 TABLET ORAL DAILY
Status: DISCONTINUED | OUTPATIENT
Start: 2024-08-31 | End: 2024-09-03 | Stop reason: HOSPADM

## 2024-08-31 RX ORDER — PANTOPRAZOLE SODIUM 40 MG/1
40 TABLET, DELAYED RELEASE ORAL
Status: DISCONTINUED | OUTPATIENT
Start: 2024-08-31 | End: 2024-09-03 | Stop reason: HOSPADM

## 2024-08-31 RX ORDER — ENOXAPARIN SODIUM 100 MG/ML
1 INJECTION SUBCUTANEOUS DAILY
Status: DISCONTINUED | OUTPATIENT
Start: 2024-08-31 | End: 2024-09-01

## 2024-08-31 RX ADMIN — METOPROLOL SUCCINATE 100 MG: 100 TABLET, EXTENDED RELEASE ORAL at 08:11

## 2024-08-31 RX ADMIN — ASPIRIN 81 MG: 81 TABLET, COATED ORAL at 08:57

## 2024-08-31 RX ADMIN — FUROSEMIDE 40 MG: 10 INJECTION, SOLUTION INTRAMUSCULAR; INTRAVENOUS at 17:10

## 2024-08-31 RX ADMIN — LEVOTHYROXINE SODIUM 25 MCG: 0.03 TABLET ORAL at 06:02

## 2024-08-31 RX ADMIN — SERTRALINE HYDROCHLORIDE 75 MG: 50 TABLET ORAL at 08:57

## 2024-08-31 RX ADMIN — LOSARTAN POTASSIUM 50 MG: 50 TABLET, FILM COATED ORAL at 08:11

## 2024-08-31 RX ADMIN — CLONAZEPAM 0.5 MG: 0.5 TABLET ORAL at 22:04

## 2024-08-31 RX ADMIN — CLONAZEPAM 0.5 MG: 0.5 TABLET ORAL at 08:11

## 2024-08-31 RX ADMIN — Medication 10 ML: at 21:00

## 2024-08-31 RX ADMIN — BUPROPION HYDROCHLORIDE 150 MG: 150 TABLET, FILM COATED, EXTENDED RELEASE ORAL at 08:11

## 2024-08-31 RX ADMIN — LOSARTAN POTASSIUM 50 MG: 50 TABLET, FILM COATED ORAL at 22:04

## 2024-08-31 RX ADMIN — Medication 10 ML: at 08:11

## 2024-08-31 RX ADMIN — SERTRALINE 100 MG: 100 TABLET, FILM COATED ORAL at 08:10

## 2024-08-31 RX ADMIN — FUROSEMIDE 40 MG: 10 INJECTION, SOLUTION INTRAMUSCULAR; INTRAVENOUS at 08:58

## 2024-08-31 RX ADMIN — ENOXAPARIN SODIUM 100 MG: 100 INJECTION SUBCUTANEOUS at 08:15

## 2024-08-31 ASSESSMENT — PAIN SCALES - GENERAL
PAINLEVEL_OUTOF10: 0

## 2024-09-01 LAB
ANION GAP SERPL CALCULATED.3IONS-SCNC: 8 MEQ/L (ref 9–15)
BUN SERPL-MCNC: 24 MG/DL (ref 8–23)
CALCIUM SERPL-MCNC: 9 MG/DL (ref 8.5–9.9)
CHLORIDE SERPL-SCNC: 100 MEQ/L (ref 95–107)
CO2 SERPL-SCNC: 34 MEQ/L (ref 20–31)
CREAT SERPL-MCNC: 1.37 MG/DL (ref 0.5–0.9)
GLUCOSE SERPL-MCNC: 96 MG/DL (ref 70–99)
MAGNESIUM SERPL-MCNC: 2 MG/DL (ref 1.7–2.4)
POTASSIUM SERPL-SCNC: 3.4 MEQ/L (ref 3.4–4.9)
SODIUM SERPL-SCNC: 142 MEQ/L (ref 135–144)

## 2024-09-01 PROCEDURE — 36415 COLL VENOUS BLD VENIPUNCTURE: CPT

## 2024-09-01 PROCEDURE — 2060000000 HC ICU INTERMEDIATE R&B

## 2024-09-01 PROCEDURE — 80048 BASIC METABOLIC PNL TOTAL CA: CPT

## 2024-09-01 PROCEDURE — 2580000003 HC RX 258: Performed by: INTERNAL MEDICINE

## 2024-09-01 PROCEDURE — 2700000000 HC OXYGEN THERAPY PER DAY

## 2024-09-01 PROCEDURE — 83735 ASSAY OF MAGNESIUM: CPT

## 2024-09-01 PROCEDURE — 99233 SBSQ HOSP IP/OBS HIGH 50: CPT | Performed by: INTERNAL MEDICINE

## 2024-09-01 PROCEDURE — 6370000000 HC RX 637 (ALT 250 FOR IP): Performed by: INTERNAL MEDICINE

## 2024-09-01 PROCEDURE — 6360000002 HC RX W HCPCS: Performed by: INTERNAL MEDICINE

## 2024-09-01 RX ORDER — FUROSEMIDE 10 MG/ML
40 INJECTION INTRAMUSCULAR; INTRAVENOUS DAILY
Status: DISCONTINUED | OUTPATIENT
Start: 2024-09-02 | End: 2024-09-02

## 2024-09-01 RX ADMIN — SERTRALINE 100 MG: 100 TABLET, FILM COATED ORAL at 09:49

## 2024-09-01 RX ADMIN — LEVOTHYROXINE SODIUM 25 MCG: 0.03 TABLET ORAL at 06:13

## 2024-09-01 RX ADMIN — ASPIRIN 81 MG: 81 TABLET, COATED ORAL at 09:06

## 2024-09-01 RX ADMIN — FUROSEMIDE 40 MG: 10 INJECTION, SOLUTION INTRAMUSCULAR; INTRAVENOUS at 09:07

## 2024-09-01 RX ADMIN — LOSARTAN POTASSIUM 50 MG: 50 TABLET, FILM COATED ORAL at 22:30

## 2024-09-01 RX ADMIN — METOPROLOL SUCCINATE 100 MG: 100 TABLET, EXTENDED RELEASE ORAL at 09:51

## 2024-09-01 RX ADMIN — SERTRALINE HYDROCHLORIDE 75 MG: 50 TABLET ORAL at 09:06

## 2024-09-01 RX ADMIN — BUPROPION HYDROCHLORIDE 150 MG: 150 TABLET, FILM COATED, EXTENDED RELEASE ORAL at 09:06

## 2024-09-01 RX ADMIN — Medication 10 ML: at 09:07

## 2024-09-01 RX ADMIN — CLONAZEPAM 0.5 MG: 0.5 TABLET ORAL at 22:30

## 2024-09-01 RX ADMIN — PANTOPRAZOLE SODIUM 40 MG: 40 TABLET, DELAYED RELEASE ORAL at 06:13

## 2024-09-01 RX ADMIN — CLONAZEPAM 0.5 MG: 0.5 TABLET ORAL at 09:06

## 2024-09-01 RX ADMIN — LOSARTAN POTASSIUM 50 MG: 50 TABLET, FILM COATED ORAL at 09:06

## 2024-09-01 RX ADMIN — APIXABAN 2.5 MG: 2.5 TABLET, FILM COATED ORAL at 22:30

## 2024-09-01 RX ADMIN — ENOXAPARIN SODIUM 100 MG: 100 INJECTION SUBCUTANEOUS at 09:06

## 2024-09-01 RX ADMIN — Medication 10 ML: at 22:31

## 2024-09-01 ASSESSMENT — PAIN SCALES - GENERAL: PAINLEVEL_OUTOF10: 0

## 2024-09-01 NOTE — CARE COORDINATION
Case Management Assessment  Initial Evaluation    Date/Time of Evaluation: 9/1/2024 10:44 AM  Assessment Completed by: Cassidy Barnes RN    If patient is discharged prior to next notation, then this note serves as note for discharge by case management.    Patient Name: Frances Meek                   YOB: 1931  Diagnosis: Hypoxemia [R09.02]  CHF (congestive heart failure), NYHA class I, acute on chronic, combined (HCC) [I50.43]  Acute on chronic congestive heart failure, unspecified heart failure type (HCC) [I50.9]                   Date / Time: 8/30/2024  7:15 AM    Patient Admission Status: Inpatient   Readmission Risk (Low < 19, Mod (19-27), High > 27): Readmission Risk Score: 17.9    Current PCP: Braydon Francis MD  PCP verified by CM? Yes    Chart Reviewed: Yes      History Provided by: Patient  Patient Orientation: Alert and Oriented    Patient Cognition: Alert    Hospitalization in the last 30 days (Readmission):  No    If yes, Readmission Assessment in  Navigator will be completed.    Advance Directives:      Code Status: DNR-CCA   Patient's Primary Decision Maker is: Named in Scanned ACP Document    Primary Decision Maker: YeniNatty - Child - [phone]    Secondary Decision Maker: SANJIV LYON - Child - [phone]    Discharge Planning:    Patient lives with:  ([facility]) Type of Home: Assisted living  Primary Care Giver:  ([facility])  Patient Support Systems include:  (FAMILYBRAD)   Current Financial resources: Medicare  Current community resources: Assisted Living  Current services prior to admission: None            Current DME:  WALKER, ROLATOR, WHEELCHAIR, SHOWER CHAIR,             Type of Home Care services:  [facility] ([facility])    ADLS  Prior functional level: Mobility, Assistance with the following:, Bathing, Dressing, Toileting  Current functional level: Mobility, Assistance with the following:, Bathing,

## 2024-09-02 LAB
ANION GAP SERPL CALCULATED.3IONS-SCNC: 11 MEQ/L (ref 9–15)
BNP BLD-MCNC: 5170 PG/ML
BUN SERPL-MCNC: 22 MG/DL (ref 8–23)
CALCIUM SERPL-MCNC: 9.3 MG/DL (ref 8.5–9.9)
CHLORIDE SERPL-SCNC: 98 MEQ/L (ref 95–107)
CO2 SERPL-SCNC: 31 MEQ/L (ref 20–31)
CREAT SERPL-MCNC: 1.2 MG/DL (ref 0.5–0.9)
GLUCOSE SERPL-MCNC: 99 MG/DL (ref 70–99)
MAGNESIUM SERPL-MCNC: 2.1 MG/DL (ref 1.7–2.4)
POTASSIUM SERPL-SCNC: 3.4 MEQ/L (ref 3.4–4.9)
SODIUM SERPL-SCNC: 140 MEQ/L (ref 135–144)

## 2024-09-02 PROCEDURE — 80048 BASIC METABOLIC PNL TOTAL CA: CPT

## 2024-09-02 PROCEDURE — 2700000000 HC OXYGEN THERAPY PER DAY

## 2024-09-02 PROCEDURE — 2060000000 HC ICU INTERMEDIATE R&B

## 2024-09-02 PROCEDURE — 99233 SBSQ HOSP IP/OBS HIGH 50: CPT | Performed by: INTERNAL MEDICINE

## 2024-09-02 PROCEDURE — 6360000002 HC RX W HCPCS: Performed by: INTERNAL MEDICINE

## 2024-09-02 PROCEDURE — 97162 PT EVAL MOD COMPLEX 30 MIN: CPT

## 2024-09-02 PROCEDURE — 2580000003 HC RX 258: Performed by: INTERNAL MEDICINE

## 2024-09-02 PROCEDURE — 83735 ASSAY OF MAGNESIUM: CPT

## 2024-09-02 PROCEDURE — 6370000000 HC RX 637 (ALT 250 FOR IP): Performed by: INTERNAL MEDICINE

## 2024-09-02 PROCEDURE — 36415 COLL VENOUS BLD VENIPUNCTURE: CPT

## 2024-09-02 PROCEDURE — 83880 ASSAY OF NATRIURETIC PEPTIDE: CPT

## 2024-09-02 RX ORDER — SPIRONOLACTONE 25 MG/1
25 TABLET ORAL DAILY
Qty: 30 TABLET | Refills: 3 | Status: ON HOLD | OUTPATIENT
Start: 2024-09-02

## 2024-09-02 RX ORDER — FUROSEMIDE 40 MG
40 TABLET ORAL DAILY
Qty: 60 TABLET | Refills: 3 | Status: ON HOLD | OUTPATIENT
Start: 2024-09-02

## 2024-09-02 RX ORDER — SPIRONOLACTONE 25 MG/1
25 TABLET ORAL DAILY
Status: DISCONTINUED | OUTPATIENT
Start: 2024-09-02 | End: 2024-09-03 | Stop reason: HOSPADM

## 2024-09-02 RX ORDER — ASPIRIN 81 MG/1
81 TABLET ORAL DAILY
Qty: 30 TABLET | Refills: 3 | Status: ON HOLD | OUTPATIENT
Start: 2024-09-03

## 2024-09-02 RX ORDER — FUROSEMIDE 40 MG
40 TABLET ORAL DAILY
Status: DISCONTINUED | OUTPATIENT
Start: 2024-09-02 | End: 2024-09-03 | Stop reason: HOSPADM

## 2024-09-02 RX ORDER — METOPROLOL SUCCINATE 100 MG/1
100 TABLET, EXTENDED RELEASE ORAL DAILY
Qty: 30 TABLET | Refills: 3 | Status: ON HOLD | OUTPATIENT
Start: 2024-09-03

## 2024-09-02 RX ORDER — LOSARTAN POTASSIUM 50 MG/1
50 TABLET ORAL 2 TIMES DAILY
Qty: 30 TABLET | Refills: 3 | Status: ON HOLD | OUTPATIENT
Start: 2024-09-02

## 2024-09-02 RX ADMIN — LOSARTAN POTASSIUM 50 MG: 50 TABLET, FILM COATED ORAL at 21:38

## 2024-09-02 RX ADMIN — FUROSEMIDE 40 MG: 10 INJECTION, SOLUTION INTRAMUSCULAR; INTRAVENOUS at 08:26

## 2024-09-02 RX ADMIN — LOSARTAN POTASSIUM 50 MG: 50 TABLET, FILM COATED ORAL at 08:26

## 2024-09-02 RX ADMIN — APIXABAN 2.5 MG: 2.5 TABLET, FILM COATED ORAL at 08:24

## 2024-09-02 RX ADMIN — ASPIRIN 81 MG: 81 TABLET, COATED ORAL at 08:26

## 2024-09-02 RX ADMIN — LEVOTHYROXINE SODIUM 25 MCG: 0.03 TABLET ORAL at 05:58

## 2024-09-02 RX ADMIN — SPIRONOLACTONE 25 MG: 25 TABLET ORAL at 13:46

## 2024-09-02 RX ADMIN — METOPROLOL SUCCINATE 100 MG: 100 TABLET, EXTENDED RELEASE ORAL at 08:24

## 2024-09-02 RX ADMIN — APIXABAN 2.5 MG: 2.5 TABLET, FILM COATED ORAL at 21:38

## 2024-09-02 RX ADMIN — CLONAZEPAM 0.5 MG: 0.5 TABLET ORAL at 08:26

## 2024-09-02 RX ADMIN — Medication 10 ML: at 08:27

## 2024-09-02 RX ADMIN — PANTOPRAZOLE SODIUM 40 MG: 40 TABLET, DELAYED RELEASE ORAL at 05:58

## 2024-09-02 RX ADMIN — BUPROPION HYDROCHLORIDE 150 MG: 150 TABLET, FILM COATED, EXTENDED RELEASE ORAL at 08:24

## 2024-09-02 RX ADMIN — SERTRALINE 100 MG: 100 TABLET, FILM COATED ORAL at 08:24

## 2024-09-02 RX ADMIN — POTASSIUM CHLORIDE 40 MEQ: 1500 TABLET, EXTENDED RELEASE ORAL at 08:26

## 2024-09-02 RX ADMIN — SERTRALINE HYDROCHLORIDE 75 MG: 50 TABLET ORAL at 08:27

## 2024-09-02 RX ADMIN — ACETAMINOPHEN 325MG 650 MG: 325 TABLET ORAL at 07:29

## 2024-09-02 RX ADMIN — CLONAZEPAM 0.5 MG: 0.5 TABLET ORAL at 21:39

## 2024-09-02 RX ADMIN — Medication 10 ML: at 21:00

## 2024-09-02 ASSESSMENT — ENCOUNTER SYMPTOMS
BLOOD IN STOOL: 0
EYES NEGATIVE: 1
WHEEZING: 0
NAUSEA: 1
CHEST TIGHTNESS: 0
SHORTNESS OF BREATH: 1
STRIDOR: 0
COUGH: 0

## 2024-09-02 NOTE — CARE COORDINATION
[facility] Pre-Admission Screening Document      Patient Name: Frances Meek       MRN: [id number]    : 1931    Age: 89 y.o.  Gender: female   Payor: Payor: MEDICARE / Plan: MEDICARE PART A AND B / Product Type: *No Product type* /   MSSP: No    Admitted from: [facility] Floor: 1w  Attending Care Provider: Roly Bass MD  Inpatient Rehab Referring Care Provider: Dr. Bass  Primary Care Provider: Braydon Francis MD  Inpatient Treatment Team including Consults: Treatment Team:   Roly Bass MD Cho, Donald I, MD Miller, DENISE Monge Heather, DO Rosso, Jennifer A, RN Gino-Gino, Jeremiah Hipp, Lisa, RN    Reason for Hospitalization:   1. Acute on chronic congestive heart failure, unspecified heart failure type (HCC)    2. Hypoxemia      Chief Complaint   Patient presents with    Shortness of Breath    Chest Pain     Isolation:No active isolations    Hospital Course:  Admit Date: 2024  7:15 AM  Inpatient Rehab Referral Date: 2024  Narrative of hospital course/history of present illness: 89 y.o. female patient with PMHx of CKD 3, Afib, CHF, and HTN who was brought to ER  via EMS from her A.L. with complaints of SOB x2 days along with increased swelling to BLE. It was noted by patient's daughter that patient does not like taking her prescribed 40mg Lasix and had not been taking it as supposed to have been. Hypoxia on RA noted and 2L of oxygen applied with improved saturations. Patient admitted to  for CHF exacerbation and hypoxia- admitted under hospitalist services with cardiology consulted.    Internal Medicine:  Assessment/Plan:  CHF: Leg edema much improved.  thigh edema resolved. Abdominal edema seems to now be minimal.  monitor renal function.  Diuresis per cardiology (freq decreased) Following with cardiology.     Nausea: has resolved.      HTN: monitor BP, adjust meds as needed  Ckd     Cardiology:  Admitted

## 2024-09-02 NOTE — FLOWSHEET NOTE
0830- AM assessment completed. Patient resting in bed at this time. Denies any chest pain. Remains atrial fibrillation on the monitor. +1 non-pitting edema noted to bilateral lower extremities. Pedal pulses palpable. Shortness of breath noted with exertion. Lungs are diminished bilaterally. SATS 98% on 1L NC. She is A/Ox3 but is forgetful at times. She can be up with a 2 person assist in the room as tolerated secondary to weakness. Denies any pain with elimination. Full linen change completed/norma care at this time secondary to incontinent urine. New pure wick catheter placed along with a new depends. Skin remains warm and pink. Zinc cream applied liberally to buttocks. Generalized bruising noted to upper extremities and discoloration noted to buttocks. #20g SL to left AC, flushed and patent. Vital signs stable and AM medication provided. Also medicated with 40 mEq PO KCl secondary to K level of 3.4. Set up for breakfast at this time. TV on and call light remains in reach.     1100- Patient asleep in bed at this time with no signs of any acute distress. Respirations remain even and unlabored. Call light remains in reach.     1215- Patient up, with a heavy 1 person assist, to the bedside commode for a small BM.     1330- Patient resting in bed at this time with TV on. Daughter at bedside. Questions answered. Call light remains in reach.     1600- Patient assisted up to reclining chair by PCA. TV on. Has no complaints or concerns at this time. Call light remains in reach.     1830- Patient assisted back to bed to rest. Denies any pain at this time. Has no complaints or concerns. Call light remains in reach.    Electronically signed by Fabby Zuniga RN on 9/2/2024 at 6:59 PM

## 2024-09-02 NOTE — CONSULTS
Spiritual Health Assessment/Progress Note  [facility]    Initial Encounter,  ,  ,      Name: Frances Meek MRN: [id number]    Age: 89 y.o.     Sex: female   Language: English   Mandaeism: Tenriism   CHF (congestive heart failure), NYHA class I, acute on chronic, combined (HCC)     Date: 9/2/2024            Total Time Calculated: 30 min      Pt welcomed  who lead a life review and legacy conversation. Pt recently lost 64 yr son to drug overdose. Pt expressed her sahara keeps her strong but is also praying for a peaceful passing. Pt expressed uneasiness with why God let her live to 89 y.o. beyond the life of her parents, , and son. Pt requested prayer and to contact local [facility].            Spiritual Assessment began in [facility] 1W TELEMETRY            Encounter Overview/Reason: Initial Encounter  Service Provided For: Patient    Sahara, Belief, Meaning:   Patient identifies as spiritual, is connected with a sahara tradition or spiritual practice, and has beliefs or practices that help with coping during difficult times  Family/Friends No family/friends present      Importance and Influence:  Patient has spiritual/personal beliefs that influence decisions regarding their health  Family/Friends no family/friends present    Community:  Patient is connected with a spiritual community and feels well-supported. Support system includes: Children  Family/Friends     Assessment and Plan of Care:     Patient Interventions include: Facilitated expression of thoughts and feelings, Explored spiritual coping/struggle/distress and theological reflection, and Affirmed coping skills/support systems  Family/Friends Interventions include:     Patient Plan of Care: Contact [facility]ara  for support or sacramental needs and Spiritual Care available upon further referral  Family/Friends Plan of Care:     Electronically signed by Chaplain Jamie on 9/2/2024 at 9:27 AM

## 2024-09-02 NOTE — DISCHARGE SUMMARY
Discharge Summary    Date: 9/2/2024  Patient Name: Frances Meek    YOB: 1931     Age: 89 y.o.    Admit Date: 8/30/2024  Discharge Date: 9/2/2024  Discharge Condition:    Admission Diagnosis  Hypoxemia [R09.02];CHF (congestive heart failure), NYHA class I, acute on chronic, combined (HCC) [I50.43];Acute on chronic congestive heart failure, unspecified heart failure type (HCC) [I50.9]      Discharge Diagnosis  Principal Problem:    CHF (congestive heart failure), NYHA class I, acute on chronic, combined (HCC)  Resolved Problems:    * No resolved hospital problems. *      Hospital Stay  Narrative of Hospital Course:  Patient comes in with fluid overload and CHF exacerbation.  Eval by cardiology and cleared for discharge.  Patient received IV Lasix here.  Pending pt/ot evaluation, pt would benefit from snif vs acute rehab due to her weakness and functional immobility and falls    Consultants:  IP CONSULT TO DIETITIAN  IP CONSULT TO CARDIOLOGY  IP CONSULT TO SPIRITUAL SERVICES    Surgeries/procedures Performed:      Treatments:            Discharge Plan/Disposition:  Home    Hospital/Incidental Findings Requiring Follow Up:    Patient Instructions:    Diet:    Activity:  For number of days (if applicable):      Other Instructions:    Provider Follow-Up:   No follow-ups on file.     Significant Diagnostic Studies:    Recent Labs:  Admission on 08/30/2024  Ventricular Rate                              Date: 08/30/2024  Value: 110         Ref range: BPM                Status: Preliminary  Atrial Rate                                   Date: 08/30/2024  Value: 102         Ref range: BPM                Status: Preliminary  QRS Duration                                  Date: 08/30/2024  Value: 86          Ref range: ms                 Status: Preliminary  Q-T Interval                                  Date: 08/30/2024  Value: 338         Ref range: ms                 Status: Preliminary  QTc Calculation

## 2024-09-03 ENCOUNTER — HOSPITAL ENCOUNTER (INPATIENT)
Age: 89
LOS: 6 days | Discharge: HOME | DRG: 947 | End: 2024-09-09
Attending: PHYSICAL MEDICINE & REHABILITATION | Admitting: PHYSICAL MEDICINE & REHABILITATION
Payer: MEDICARE

## 2024-09-03 VITALS
WEIGHT: 219.8 LBS | SYSTOLIC BLOOD PRESSURE: 134 MMHG | HEIGHT: 62 IN | OXYGEN SATURATION: 94 % | TEMPERATURE: 97.9 F | DIASTOLIC BLOOD PRESSURE: 75 MMHG | RESPIRATION RATE: 18 BRPM | BODY MASS INDEX: 40.45 KG/M2 | HEART RATE: 77 BPM

## 2024-09-03 DIAGNOSIS — Z78.9 IMPAIRED MOBILITY AND ADLS: Primary | ICD-10-CM

## 2024-09-03 DIAGNOSIS — Z74.09 IMPAIRED MOBILITY AND ADLS: Primary | ICD-10-CM

## 2024-09-03 PROBLEM — J15.9 COMMUNITY ACQUIRED BACTERIAL PNEUMONIA: Status: ACTIVE | Noted: 2024-03-12

## 2024-09-03 PROBLEM — D50.8 OTHER IRON DEFICIENCY ANEMIAS: Status: ACTIVE | Noted: 2024-03-14

## 2024-09-03 PROBLEM — I50.9 ACUTE ON CHRONIC CONGESTIVE HEART FAILURE (HCC): Status: ACTIVE | Noted: 2024-09-03

## 2024-09-03 PROBLEM — E66.811 CLASS 1 OBESITY DUE TO EXCESS CALORIES WITH SERIOUS COMORBIDITY AND BODY MASS INDEX (BMI) OF 31.0 TO 31.9 IN ADULT: Status: ACTIVE | Noted: 2024-03-12

## 2024-09-03 PROBLEM — E03.9 HYPOTHYROIDISM: Status: ACTIVE | Noted: 2018-09-16

## 2024-09-03 PROBLEM — I35.0 AORTIC STENOSIS, MODERATE: Status: ACTIVE | Noted: 2024-03-12

## 2024-09-03 PROBLEM — I27.20 PULMONARY HYPERTENSION (HCC): Status: ACTIVE | Noted: 2024-03-12

## 2024-09-03 PROBLEM — E66.09 CLASS 1 OBESITY DUE TO EXCESS CALORIES WITH SERIOUS COMORBIDITY AND BODY MASS INDEX (BMI) OF 31.0 TO 31.9 IN ADULT: Status: ACTIVE | Noted: 2024-03-12

## 2024-09-03 LAB
ANION GAP SERPL CALCULATED.3IONS-SCNC: 8 MEQ/L (ref 9–15)
BUN SERPL-MCNC: 25 MG/DL (ref 8–23)
CALCIUM SERPL-MCNC: 9.3 MG/DL (ref 8.5–9.9)
CHLORIDE SERPL-SCNC: 100 MEQ/L (ref 95–107)
CO2 SERPL-SCNC: 32 MEQ/L (ref 20–31)
CREAT SERPL-MCNC: 1.27 MG/DL (ref 0.5–0.9)
EKG ATRIAL RATE: 102 BPM
EKG Q-T INTERVAL: 338 MS
EKG QRS DURATION: 86 MS
EKG QTC CALCULATION (BAZETT): 457 MS
EKG R AXIS: -2 DEGREES
EKG T AXIS: 148 DEGREES
EKG VENTRICULAR RATE: 110 BPM
GLUCOSE SERPL-MCNC: 98 MG/DL (ref 70–99)
MAGNESIUM SERPL-MCNC: 2.1 MG/DL (ref 1.7–2.4)
POTASSIUM SERPL-SCNC: 3.7 MEQ/L (ref 3.4–4.9)
SODIUM SERPL-SCNC: 140 MEQ/L (ref 135–144)

## 2024-09-03 PROCEDURE — 6370000000 HC RX 637 (ALT 250 FOR IP): Performed by: INTERNAL MEDICINE

## 2024-09-03 PROCEDURE — 1180000000 HC REHAB R&B

## 2024-09-03 PROCEDURE — 83735 ASSAY OF MAGNESIUM: CPT

## 2024-09-03 PROCEDURE — 99233 SBSQ HOSP IP/OBS HIGH 50: CPT | Performed by: INTERNAL MEDICINE

## 2024-09-03 PROCEDURE — 99221 1ST HOSP IP/OBS SF/LOW 40: CPT | Performed by: PHYSICAL MEDICINE & REHABILITATION

## 2024-09-03 PROCEDURE — 36415 COLL VENOUS BLD VENIPUNCTURE: CPT

## 2024-09-03 PROCEDURE — 2580000003 HC RX 258: Performed by: INTERNAL MEDICINE

## 2024-09-03 PROCEDURE — 97166 OT EVAL MOD COMPLEX 45 MIN: CPT

## 2024-09-03 PROCEDURE — 97116 GAIT TRAINING THERAPY: CPT

## 2024-09-03 PROCEDURE — 80048 BASIC METABOLIC PNL TOTAL CA: CPT

## 2024-09-03 PROCEDURE — 2700000000 HC OXYGEN THERAPY PER DAY

## 2024-09-03 RX ORDER — SODIUM CHLORIDE 0.9 % (FLUSH) 0.9 %
5-40 SYRINGE (ML) INJECTION PRN
Status: CANCELLED | OUTPATIENT
Start: 2024-09-03

## 2024-09-03 RX ORDER — CLONAZEPAM 0.5 MG/1
0.5 TABLET ORAL 2 TIMES DAILY
Status: DISCONTINUED | OUTPATIENT
Start: 2024-09-03 | End: 2024-09-09 | Stop reason: HOSPADM

## 2024-09-03 RX ORDER — ONDANSETRON 2 MG/ML
4 INJECTION INTRAMUSCULAR; INTRAVENOUS EVERY 6 HOURS PRN
Status: CANCELLED | OUTPATIENT
Start: 2024-09-03

## 2024-09-03 RX ORDER — SPIRONOLACTONE 25 MG/1
25 TABLET ORAL DAILY
Status: DISCONTINUED | OUTPATIENT
Start: 2024-09-04 | End: 2024-09-09 | Stop reason: HOSPADM

## 2024-09-03 RX ORDER — ASPIRIN 81 MG/1
81 TABLET ORAL DAILY
Status: CANCELLED | OUTPATIENT
Start: 2024-09-04

## 2024-09-03 RX ORDER — ACETAMINOPHEN 650 MG/1
650 SUPPOSITORY RECTAL EVERY 6 HOURS PRN
Status: DISCONTINUED | OUTPATIENT
Start: 2024-09-03 | End: 2024-09-04

## 2024-09-03 RX ORDER — ACETAMINOPHEN 325 MG/1
650 TABLET ORAL EVERY 6 HOURS PRN
Status: DISCONTINUED | OUTPATIENT
Start: 2024-09-03 | End: 2024-09-04

## 2024-09-03 RX ORDER — SODIUM CHLORIDE 0.9 % (FLUSH) 0.9 %
5-40 SYRINGE (ML) INJECTION PRN
Status: DISCONTINUED | OUTPATIENT
Start: 2024-09-03 | End: 2024-09-09 | Stop reason: HOSPADM

## 2024-09-03 RX ORDER — BUPROPION HYDROCHLORIDE 150 MG/1
150 TABLET, EXTENDED RELEASE ORAL DAILY
Status: DISCONTINUED | OUTPATIENT
Start: 2024-09-04 | End: 2024-09-09 | Stop reason: HOSPADM

## 2024-09-03 RX ORDER — SPIRONOLACTONE 25 MG/1
25 TABLET ORAL DAILY
Status: CANCELLED | OUTPATIENT
Start: 2024-09-04

## 2024-09-03 RX ORDER — FUROSEMIDE 40 MG
40 TABLET ORAL DAILY
Status: CANCELLED | OUTPATIENT
Start: 2024-09-04

## 2024-09-03 RX ORDER — PANTOPRAZOLE SODIUM 40 MG/1
40 TABLET, DELAYED RELEASE ORAL
Status: CANCELLED | OUTPATIENT
Start: 2024-09-04

## 2024-09-03 RX ORDER — SERTRALINE HYDROCHLORIDE 100 MG/1
100 TABLET, FILM COATED ORAL DAILY
Status: CANCELLED | OUTPATIENT
Start: 2024-09-04

## 2024-09-03 RX ORDER — FUROSEMIDE 40 MG
40 TABLET ORAL DAILY
Status: DISCONTINUED | OUTPATIENT
Start: 2024-09-04 | End: 2024-09-09 | Stop reason: HOSPADM

## 2024-09-03 RX ORDER — ACETAMINOPHEN 500 MG
500 TABLET ORAL EVERY 6 HOURS PRN
Status: DISCONTINUED | OUTPATIENT
Start: 2024-09-03 | End: 2024-09-04

## 2024-09-03 RX ORDER — ONDANSETRON 4 MG/1
4 TABLET, ORALLY DISINTEGRATING ORAL EVERY 8 HOURS PRN
Status: CANCELLED | OUTPATIENT
Start: 2024-09-03

## 2024-09-03 RX ORDER — SODIUM CHLORIDE 0.9 % (FLUSH) 0.9 %
5-40 SYRINGE (ML) INJECTION EVERY 12 HOURS SCHEDULED
Status: DISCONTINUED | OUTPATIENT
Start: 2024-09-03 | End: 2024-09-06

## 2024-09-03 RX ORDER — LEVOTHYROXINE SODIUM 25 UG/1
25 TABLET ORAL DAILY
Status: CANCELLED | OUTPATIENT
Start: 2024-09-04

## 2024-09-03 RX ORDER — SODIUM CHLORIDE 0.9 % (FLUSH) 0.9 %
5-40 SYRINGE (ML) INJECTION EVERY 12 HOURS SCHEDULED
Status: CANCELLED | OUTPATIENT
Start: 2024-09-03

## 2024-09-03 RX ORDER — SERTRALINE HYDROCHLORIDE 100 MG/1
100 TABLET, FILM COATED ORAL DAILY
Status: DISCONTINUED | OUTPATIENT
Start: 2024-09-04 | End: 2024-09-09 | Stop reason: HOSPADM

## 2024-09-03 RX ORDER — LEVOTHYROXINE SODIUM 25 UG/1
25 TABLET ORAL DAILY
Status: DISCONTINUED | OUTPATIENT
Start: 2024-09-04 | End: 2024-09-09 | Stop reason: HOSPADM

## 2024-09-03 RX ORDER — SENNA AND DOCUSATE SODIUM 50; 8.6 MG/1; MG/1
1 TABLET, FILM COATED ORAL DAILY PRN
Status: DISCONTINUED | OUTPATIENT
Start: 2024-09-03 | End: 2024-09-09 | Stop reason: HOSPADM

## 2024-09-03 RX ORDER — HYDRALAZINE HYDROCHLORIDE 20 MG/ML
10 INJECTION INTRAMUSCULAR; INTRAVENOUS EVERY 6 HOURS PRN
Status: DISCONTINUED | OUTPATIENT
Start: 2024-09-03 | End: 2024-09-09 | Stop reason: HOSPADM

## 2024-09-03 RX ORDER — CLONAZEPAM 0.5 MG/1
0.5 TABLET ORAL 2 TIMES DAILY
Status: CANCELLED | OUTPATIENT
Start: 2024-09-03

## 2024-09-03 RX ORDER — ACETAMINOPHEN 500 MG
500 TABLET ORAL EVERY 6 HOURS PRN
Status: CANCELLED | OUTPATIENT
Start: 2024-09-03

## 2024-09-03 RX ORDER — METOPROLOL SUCCINATE 100 MG/1
100 TABLET, EXTENDED RELEASE ORAL DAILY
Status: CANCELLED | OUTPATIENT
Start: 2024-09-04

## 2024-09-03 RX ORDER — ONDANSETRON 2 MG/ML
4 INJECTION INTRAMUSCULAR; INTRAVENOUS EVERY 6 HOURS PRN
Status: DISCONTINUED | OUTPATIENT
Start: 2024-09-03 | End: 2024-09-09 | Stop reason: HOSPADM

## 2024-09-03 RX ORDER — POLYETHYLENE GLYCOL 3350 17 G/17G
17 POWDER, FOR SOLUTION ORAL DAILY PRN
Status: CANCELLED | OUTPATIENT
Start: 2024-09-03

## 2024-09-03 RX ORDER — POLYETHYLENE GLYCOL 3350 17 G/17G
17 POWDER, FOR SOLUTION ORAL DAILY PRN
Status: DISCONTINUED | OUTPATIENT
Start: 2024-09-03 | End: 2024-09-09 | Stop reason: HOSPADM

## 2024-09-03 RX ORDER — SERTRALINE HYDROCHLORIDE 25 MG/1
75 TABLET, FILM COATED ORAL DAILY
Status: DISCONTINUED | OUTPATIENT
Start: 2024-09-04 | End: 2024-09-09 | Stop reason: HOSPADM

## 2024-09-03 RX ORDER — HYDRALAZINE HYDROCHLORIDE 20 MG/ML
10 INJECTION INTRAMUSCULAR; INTRAVENOUS EVERY 6 HOURS PRN
Status: CANCELLED | OUTPATIENT
Start: 2024-09-03

## 2024-09-03 RX ORDER — SODIUM CHLORIDE 9 MG/ML
INJECTION, SOLUTION INTRAVENOUS PRN
Status: CANCELLED | OUTPATIENT
Start: 2024-09-03

## 2024-09-03 RX ORDER — SODIUM CHLORIDE 9 MG/ML
INJECTION, SOLUTION INTRAVENOUS PRN
Status: DISCONTINUED | OUTPATIENT
Start: 2024-09-03 | End: 2024-09-09 | Stop reason: HOSPADM

## 2024-09-03 RX ORDER — ASPIRIN 81 MG/1
81 TABLET ORAL DAILY
Status: DISCONTINUED | OUTPATIENT
Start: 2024-09-04 | End: 2024-09-09 | Stop reason: HOSPADM

## 2024-09-03 RX ORDER — LOSARTAN POTASSIUM 50 MG/1
50 TABLET ORAL 2 TIMES DAILY
Status: DISCONTINUED | OUTPATIENT
Start: 2024-09-03 | End: 2024-09-09 | Stop reason: HOSPADM

## 2024-09-03 RX ORDER — SENNA AND DOCUSATE SODIUM 50; 8.6 MG/1; MG/1
1 TABLET, FILM COATED ORAL DAILY PRN
Status: CANCELLED | OUTPATIENT
Start: 2024-09-03

## 2024-09-03 RX ORDER — BUPROPION HYDROCHLORIDE 150 MG/1
150 TABLET, EXTENDED RELEASE ORAL DAILY
Status: CANCELLED | OUTPATIENT
Start: 2024-09-04

## 2024-09-03 RX ORDER — LOSARTAN POTASSIUM 50 MG/1
50 TABLET ORAL 2 TIMES DAILY
Status: CANCELLED | OUTPATIENT
Start: 2024-09-03

## 2024-09-03 RX ORDER — METOPROLOL SUCCINATE 100 MG/1
100 TABLET, EXTENDED RELEASE ORAL DAILY
Status: DISCONTINUED | OUTPATIENT
Start: 2024-09-04 | End: 2024-09-09 | Stop reason: HOSPADM

## 2024-09-03 RX ORDER — ONDANSETRON 4 MG/1
4 TABLET, ORALLY DISINTEGRATING ORAL EVERY 8 HOURS PRN
Status: DISCONTINUED | OUTPATIENT
Start: 2024-09-03 | End: 2024-09-09 | Stop reason: HOSPADM

## 2024-09-03 RX ORDER — ACETAMINOPHEN 650 MG/1
650 SUPPOSITORY RECTAL EVERY 6 HOURS PRN
Status: CANCELLED | OUTPATIENT
Start: 2024-09-03

## 2024-09-03 RX ORDER — PANTOPRAZOLE SODIUM 40 MG/1
40 TABLET, DELAYED RELEASE ORAL
Status: DISCONTINUED | OUTPATIENT
Start: 2024-09-04 | End: 2024-09-09 | Stop reason: HOSPADM

## 2024-09-03 RX ORDER — ACETAMINOPHEN 325 MG/1
650 TABLET ORAL EVERY 6 HOURS PRN
Status: CANCELLED | OUTPATIENT
Start: 2024-09-03

## 2024-09-03 RX ADMIN — LEVOTHYROXINE SODIUM 25 MCG: 0.03 TABLET ORAL at 06:06

## 2024-09-03 RX ADMIN — LOSARTAN POTASSIUM 50 MG: 50 TABLET, FILM COATED ORAL at 08:20

## 2024-09-03 RX ADMIN — PANTOPRAZOLE SODIUM 40 MG: 40 TABLET, DELAYED RELEASE ORAL at 06:06

## 2024-09-03 RX ADMIN — SERTRALINE HYDROCHLORIDE 75 MG: 50 TABLET ORAL at 08:22

## 2024-09-03 RX ADMIN — ACETAMINOPHEN 325MG 650 MG: 325 TABLET ORAL at 06:06

## 2024-09-03 RX ADMIN — APIXABAN 2.5 MG: 2.5 TABLET, FILM COATED ORAL at 08:21

## 2024-09-03 RX ADMIN — ASPIRIN 81 MG: 81 TABLET, COATED ORAL at 08:21

## 2024-09-03 RX ADMIN — SERTRALINE 100 MG: 100 TABLET, FILM COATED ORAL at 08:21

## 2024-09-03 RX ADMIN — METOPROLOL SUCCINATE 100 MG: 100 TABLET, EXTENDED RELEASE ORAL at 08:21

## 2024-09-03 RX ADMIN — CLONAZEPAM 0.5 MG: 0.5 TABLET ORAL at 08:21

## 2024-09-03 RX ADMIN — SPIRONOLACTONE 25 MG: 25 TABLET ORAL at 08:21

## 2024-09-03 RX ADMIN — APIXABAN 2.5 MG: 2.5 TABLET, FILM COATED ORAL at 23:50

## 2024-09-03 RX ADMIN — BUPROPION HYDROCHLORIDE 150 MG: 150 TABLET, FILM COATED, EXTENDED RELEASE ORAL at 08:20

## 2024-09-03 RX ADMIN — LOSARTAN POTASSIUM 50 MG: 50 TABLET, FILM COATED ORAL at 23:50

## 2024-09-03 RX ADMIN — FUROSEMIDE 40 MG: 40 TABLET ORAL at 08:21

## 2024-09-03 RX ADMIN — Medication 10 ML: at 08:22

## 2024-09-03 RX ADMIN — CLONAZEPAM 0.5 MG: 0.5 TABLET ORAL at 23:51

## 2024-09-03 ASSESSMENT — ENCOUNTER SYMPTOMS
WHEEZING: 0
EYE REDNESS: 0
ABDOMINAL DISTENTION: 0
PHOTOPHOBIA: 0
TROUBLE SWALLOWING: 0
SHORTNESS OF BREATH: 0
SHORTNESS OF BREATH: 1
CONSTIPATION: 0
NAUSEA: 0
COLOR CHANGE: 0
EYE PAIN: 0
ABDOMINAL PAIN: 0
NAUSEA: 1
EYES NEGATIVE: 1
BLOOD IN STOOL: 0
VOMITING: 0
CHEST TIGHTNESS: 0
ANAL BLEEDING: 0
STRIDOR: 0
FACIAL SWELLING: 0
COUGH: 0
CHOKING: 0

## 2024-09-03 ASSESSMENT — PAIN SCALES - GENERAL: PAINLEVEL_OUTOF10: 0

## 2024-09-03 NOTE — CONSULTS
Physical Medicine & Rehabilitation  Consult Note      Admitting Physician: Roly Bass MD    Primary Care Provider: Braydon Francis MD     Reason for Consult:  Asses rehab needs, promote physical and mental function, analyze level of care to determine rehab needs, improve ability to actively participate in the rehabilitation process, and decrease likelihood of re-admit to the hospital after discharge.      History of Present Illness:    Frances Meek is a 89 y.o. female admitted to [facility] on 8/30/2024.      Admitted via the ED with SOB and dxd with CHF exac.  Leg swelling improved--but still SOB rel with O2 NC.        Fatigue  This is a recurrent problem. The current episode started 1 to 4 weeks ago. The problem has been rapidly improving. Associated symptoms include arthralgias, fatigue, myalgias and weakness. Pertinent negatives include no abdominal pain, chest pain, chills, coughing, fever, headaches, nausea, neck pain, numbness, rash or vomiting.         I reviewed recent nursing notes discussed care with acute care providers, \"     Care plan remains ongoing. Monitor labwork. Work with PT/OT. Rehab evaluation.        \".   Events from the previous 24 hours reviewed and discussed .      Their inpatient work up has included:    Imaging:  Imaging and other studies reviewed and discussed with patient and staff    XR CHEST   8/30/2024 Portable chest reveals heart to be enlarged.  Chronic interstitial changes seen throughout the lung fields bilaterally with small bilateral pleural effusions.  Degenerative changes seen within the spine.  No new focal parenchymal opacification present.     Cardiomegaly with stable chronic interstitial changes seen throughout the lung fields bilaterally with small bilateral pleural effusions.  No new focal parenchymal opacification present.              Labs:    Labs reviewed and discussed with patient and staff    No results found for: \"POCGLU\"  Lab Results

## 2024-09-03 NOTE — PROGRESS NOTES
PROGRESS NOTE    Patient Name: Frances Meek  Admit Date: 2024  7:15 AM  MR #: [id number]  : 1931    Attending Physician: Aaron Rand MD  Reason for consult: CP SOB    History of Presenting Illness:      Frances Meek is a 89 y.o. female on hospital day 2 with a history of .   History Obtained From:  patient, electronic medical record    Admitted form NH for Persistent Nausea, SOB, Worse LE Edema and Right sided CP.  CP has been going on for 2 weeks. It is intermittent. Nausea was severe yesterday and was not able to eat nor take any meds.   She does not use O2 at NH. She is on 5L O2 now.   Her presenting /100    HS Troponin 19>20    ECG .  She has High Lateral wall ischemia on ECG that is not new. She is on Eliquis.  She does fall on occasions.   She is DNR CCA per daughter who is her POA.     She has Persistent AF.      2024 Echo EF 50-55% Mild to Mod AS.  Moderate MR. moderate severe PHTN    24 Tele AF 87 1.6L diuresed. Breathing is better. Denies CP.  Mild Pre renal.     24 Tele AF 84 Diuresed 2L.  She thinks she has a bladder infection. No cp no sob  History:      EKG:  Past Medical History:   Diagnosis Date    Atrial fibrillation (HCC)      History reviewed. No pertinent surgical history.    Family History  History reviewed. No pertinent family history.  [] Unable to obtain due to ventilated and/ or neurologic status    Social History     Socioeconomic History    Marital status:      Spouse name: Not on file    Number of children: Not on file    Years of education: Not on file    Highest education level: Not on file   Occupational History    Not on file   Tobacco Use    Smoking status: Never     Passive exposure: Never    Smokeless tobacco: Never   Substance and Sexual Activity    Alcohol use: Not Currently    Drug use: Never    Sexual activity: Not Currently   Other Topics Concern    Not on file   Social History Narrative    Not on file     Social 
  PROGRESS NOTE    Patient Name: Frances Meek  Admit Date: 2024  7:15 AM  MR #: [id number]  : 1931    Attending Physician: Roly Bass MD  Reason for consult: CP SOB    History of Presenting Illness:      Frances Meek is a 89 y.o. female on hospital day 4 with a history of .   History Obtained From:  patient, electronic medical record    Admitted form NH for Persistent Nausea, SOB, Worse LE Edema and Right sided CP.  CP has been going on for 2 weeks. It is intermittent. Nausea was severe yesterday and was not able to eat nor take any meds.   She does not use O2 at NH. She is on 5L O2 now.   Her presenting /100    HS Troponin 19>20    ECG .  She has High Lateral wall ischemia on ECG that is not new. She is on Eliquis.  She does fall on occasions.   She is DNR CCA per daughter who is her POA.     She has Persistent AF.      2024 Echo EF 50-55% Mild to Mod AS.  Moderate MR. moderate severe PHTN    24 Tele AF 87 1.6L diuresed. Breathing is better. Denies CP.  Mild Pre renal.     24 Tele AF 84 Diuresed 2L.  She thinks she has a bladder infection. No cp no sob    24 Tele AF 79 did not sleep well. Denies CP nor SOB> lying flat in bed.    9/3/24 Tele AF 81.  Sitting in chair denies CP nor SOB. Unsteady and has not walked.   History:      EKG:  Past Medical History:   Diagnosis Date    Atrial fibrillation (HCC)      History reviewed. No pertinent surgical history.    Family History  History reviewed. No pertinent family history.  [] Unable to obtain due to ventilated and/ or neurologic status    Social History     Socioeconomic History    Marital status:      Spouse name: Not on file    Number of children: Not on file    Years of education: Not on file    Highest education level: Not on file   Occupational History    Not on file   Tobacco Use    Smoking status: Never     Passive exposure: Never    Smokeless tobacco: Never   Substance and Sexual Activity    Alcohol use: 
Hospitalist Progress Note      Date of Admission: 8/30/2024  Chief Complaint:    Chief Complaint   Patient presents with    Shortness of Breath    Chest Pain     Subjective:  No new complaints.  No nausea, vomiting, chest pain, or headache      Medications:    Infusion Medications    sodium chloride       Scheduled Medications    [START ON 9/2/2024] furosemide  40 mg IntraVENous Daily    apixaban  2.5 mg Oral BID    aspirin  81 mg Oral Daily    pantoprazole  40 mg Oral QAM AC    sodium chloride flush  5-40 mL IntraVENous 2 times per day    metoprolol succinate  100 mg Oral Daily    losartan  50 mg Oral BID    clonazePAM  0.5 mg Oral BID    levothyroxine  25 mcg Oral Daily    sertraline  100 mg Oral Daily    sertraline  75 mg Oral Daily    buPROPion  150 mg Oral Daily     PRN Meds: sodium chloride flush, sodium chloride, ondansetron **OR** ondansetron, polyethylene glycol, acetaminophen **OR** acetaminophen, potassium chloride **OR** potassium alternative oral replacement **OR** potassium chloride, magnesium sulfate, hydrALAZINE, acetaminophen, sennosides-docusate sodium    Intake/Output Summary (Last 24 hours) at 9/1/2024 1338  Last data filed at 9/1/2024 0907  Gross per 24 hour   Intake 600 ml   Output 1800 ml   Net -1200 ml     Exam:  /79   Pulse 80   Temp 98.6 °F (37 °C) (Oral)   Resp 16   Ht 1.575 m (5' 2\")   Wt 99.7 kg (219 lb 12.8 oz)   LMP  (LMP Unknown)   SpO2 98%   BMI 40.20 kg/m²   Head: Normocephalic, atraumatic  Sclera clear  Neck JVD flat  Lungs: Normal effort of breathing    Labs:   Recent Labs     08/30/24  0729   WBC 7.5   HGB 14.0   HCT 41.3        Recent Labs     08/30/24  0729 08/31/24  0533 09/01/24  0512    145* 142   K 4.3 3.5 3.4    102 100   CO2 29 32* 34*   BUN 16 20 24*   CREATININE 1.12* 1.32* 1.37*   CALCIUM 9.6 9.2 9.0   AST 25  --   --    ALT 25  --   --    BILITOT 0.8*  --   --    ALKPHOS 78  --   --      Recent Labs     08/30/24  0729   INR 1.2     No 
Hospitalist Progress Note      Date of Admission: 8/30/2024  Chief Complaint:    Chief Complaint   Patient presents with    Shortness of Breath    Chest Pain     Subjective:  No new complaints.  No nausea, vomiting, chest pain, or headache      Medications:    Infusion Medications    sodium chloride       Scheduled Medications    enoxaparin  1 mg/kg SubCUTAneous Daily    aspirin  81 mg Oral Daily    pantoprazole  40 mg Oral QAM AC    sodium chloride flush  5-40 mL IntraVENous 2 times per day    furosemide  40 mg IntraVENous BID    metoprolol succinate  100 mg Oral Daily    losartan  50 mg Oral BID    clonazePAM  0.5 mg Oral BID    levothyroxine  25 mcg Oral Daily    sertraline  100 mg Oral Daily    sertraline  75 mg Oral Daily    buPROPion  150 mg Oral Daily     PRN Meds: sodium chloride flush, sodium chloride, ondansetron **OR** ondansetron, polyethylene glycol, acetaminophen **OR** acetaminophen, potassium chloride **OR** potassium alternative oral replacement **OR** potassium chloride, magnesium sulfate, hydrALAZINE, acetaminophen, sennosides-docusate sodium    Intake/Output Summary (Last 24 hours) at 8/31/2024 1528  Last data filed at 8/31/2024 1107  Gross per 24 hour   Intake 720 ml   Output 1250 ml   Net -530 ml     Exam:  BP (!) 144/74   Pulse 82   Temp 97.9 °F (36.6 °C) (Oral)   Resp 16   Ht 1.575 m (5' 2\")   Wt 99.7 kg (219 lb 12.8 oz)   LMP  (LMP Unknown)   SpO2 99%   BMI 40.20 kg/m²   Head: Normocephalic, atraumatic  Sclera clear  Neck JVD flat  Lungs: Normal effort of breathing    Labs:   Recent Labs     08/30/24  0729   WBC 7.5   HGB 14.0   HCT 41.3        Recent Labs     08/30/24  0729 08/31/24  0533    145*   K 4.3 3.5    102   CO2 29 32*   BUN 16 20   CREATININE 1.12* 1.32*   CALCIUM 9.6 9.2   AST 25  --    ALT 25  --    BILITOT 0.8*  --    ALKPHOS 78  --      Recent Labs     08/30/24  0729   INR 1.2     No results for input(s): \"CKTOTAL\", \"TROPONINI\" in the last 72 
Progress Note  Date:9/3/2024       Room:[place] [address]  Patient Name:Frances Meek     YOB: 1931     Age:89 y.o.        Subjective    Subjective   Review of Systems  Objective         Vitals Last 24 Hours:  TEMPERATURE:  Temp  Av.4 °F (36.9 °C)  Min: 98.1 °F (36.7 °C)  Max: 98.6 °F (37 °C)  RESPIRATIONS RANGE: Resp  Av.3  Min: 18  Max: 20  PULSE OXIMETRY RANGE: SpO2  Av.6 %  Min: 90 %  Max: 96 %  PULSE RANGE: Pulse  Av  Min: 74  Max: 93  BLOOD PRESSURE RANGE: Systolic (24hrs), Av , Min:139 , Max:158   ; Diastolic (24hrs), Av, Min:60, Max:83    I/O (24Hr):    Intake/Output Summary (Last 24 hours) at 9/3/2024 1111  Last data filed at 2024 1722  Gross per 24 hour   Intake 420 ml   Output 600 ml   Net -180 ml     Objective  Labs/Imaging/Diagnostics    Labs:  CBC:No results for input(s): \"WBC\", \"RBC\", \"HGB\", \"HCT\", \"MCV\", \"RDW\", \"PLT\" in the last 72 hours.  CHEMISTRIES:  Recent Labs     24  0512 24  0719 24  0504    140 140   K 3.4 3.4 3.7    98 100   CO2 34* 31 32*   BUN 24* 22 25*   CREATININE 1.37* 1.20* 1.27*   GLUCOSE 96 99 98   MG 2.0 2.1 2.1     PT/INR:No results for input(s): \"PROTIME\", \"INR\" in the last 72 hours.  APTT:No results for input(s): \"APTT\" in the last 72 hours.  LIVER PROFILE:No results for input(s): \"AST\", \"ALT\", \"BILIDIR\", \"BILITOT\", \"ALKPHOS\" in the last 72 hours.    Imaging Last 24 Hours:  No results found.  Assessment//Plan           Hospital Problems             Last Modified POA    * (Principal) CHF (congestive heart failure), NYHA class I, acute on chronic, combined ([facility]) 2024 Yes    Impaired mobility and activities of daily living 9/3/2024 Yes   CHF  Weakness  HTN  CKD  Assessment & Plan  : discharged  9/3: going to acute rehab, no overnight events, denies any needs, spoke with casemanagement TCT 25 min  Electronically signed by Roly Bass MD on 9/3/24 at 11:11 AM EDT    
Renal Adjustment Per Protocol: lovenox 1 mg/kg BID changed to lovenox 1 mg/kg Daily based on    Recent Labs     08/31/24  0533   CREATININE 1.32*   Estimated Creatinine Clearance: 29 mL/min (A) (based on SCr of 1.32 mg/dL (H)).  .    
tolerance  Patient Goals   Patient Goals : to go home    PLAN    General Plan: 1 time a day 3-6 times a week  Safety Devices  Type of Devices: Call light within reach, Left in chair, Chair alarm in place, All fall risk precautions in place     AMPAC (6 CLICK) BASIC MOBILITY  AM-PAC Inpatient Mobility Raw Score : 13      Therapy Time   Individual   Time In 1433   Time Out 1457   Minutes 24     Timed Code Treatment Minutes: 24 Minutes    Gait: 22  ADL: 2       Sincere Huang PTA, 09/03/24 at 3:07 PM         Definitions for assistance levels  Independent = pt does not require any physical supervision or assistance from another person for activity completion. Device may be needed.  Stand by assistance = pt requires verbal cues or instructions from another person, close to but not touching, to perform the activity  Minimal assistance= pt performs 75% or more of the activity; assistance is required to complete the activity  Moderate assistance= pt performs 50% of the activity; assistance is required to complete the activity  Maximal assistance = pt performs 25% of the activity; assistance is required to complete the activity  Dependent = pt requires total physical assistance to accomplish the task  
mobility training, Transfer training, Gait training, Stair training, Neuromuscular re-education, Equipment evaluation, education, & procurement, Home exercise program, Safety education & training, Patient/Caregiver education & training, Therapeutic activities, Co-Treatment  Safety Devices  Type of Devices: Bed alarm in place, Call light within reach, Left in bed  Transfer PT POC to supervising med/surg therapist.    Goals:  Patient Goals : to go home  Long Term Goals  Long Term Goal 1: Bed mobility with indep  Long Term Goal 2: Functional transfers with indep  Long Term Goal 3: Amb 150ft with LRAD (rollator) and indep  Long Term Goal 4: DAI >38/56 to demonstrate decreased fall risk  Long Term Goal 5: indep with HEP to improve LE strength and activity tolerance    AMPAC (6 CLICK) BASIC MOBILITY  AM-PAC Inpatient Mobility Raw Score : 13     Therapy Time:   Individual   Time In 0924   Time Out 0939   Minutes 15         Nida Huffman, PT, DPT 09/02/24 at 1:55 PM       Definitions for assistance levels  Independent = pt does not require any physical supervision or assistance from another person for activity completion. Device may be needed.  Stand by assistance = pt requires verbal cues or instructions from another person, close to but not touching, to perform the activity  Minimal assistance= pt performs 75% or more of the activity; assistance is required to complete the activity  Moderate assistance= pt performs 50% of the activity; assistance is required to complete the activity  Maximal assistance = pt performs 25% of the activity; assistance is required to complete the activity  Dependent = pt requires total physical assistance to accomplish the task  
sodium chloride          Allergies:     Allergies   Allergen Reactions    Ace Inhibitors Swelling     RASH      Albuterol Sulfate      Patient states it made face red and hot.    Amlodipine Other (See Comments)     Facial flushing    Aspirin      GI UPSET    Carvedilol      Pt states she \"felt like a zombie\" on this, fatigue, nausea, unsteady on feet.    Cephalexin Other (See Comments)    Cholecalciferol Itching     Only with 50,000 units    Clonidine Other (See Comments)     sore tongue and lips    Codeine Other (See Comments)     GI UPSET    Estrogens     Hydrochlorothiazide      lips red and itchy    Ibuprofen      ITCHING    Nsaids      Aleve, eyes swell    Quinolones      ACHY JOINTS      Sulfa Antibiotics      SWELLING    Tetracyclines & Related      HIVES    Thiazide-Type Diuretics      itch    Esomeprazole Magnesium      sore throat, red lips and sore tongue        Review of Systems:       Review of Systems   Constitutional: Negative.  Negative for diaphoresis and fatigue.   HENT: Negative.     Eyes: Negative.    Respiratory:  Positive for shortness of breath. Negative for cough, chest tightness, wheezing and stridor.    Cardiovascular:  Positive for chest pain and leg swelling. Negative for palpitations.   Gastrointestinal:  Positive for nausea. Negative for blood in stool.   Genitourinary: Negative.    Musculoskeletal: Negative.    Skin: Negative.    Neurological: Negative.  Negative for dizziness, syncope, weakness and light-headedness.   Hematological: Negative.    Psychiatric/Behavioral: Negative.           Objective Findings:     Vitals:BP (!) 140/65   Pulse 81   Temp 97.9 °F (36.6 °C) (Oral)   Resp 18   Ht 1.575 m (5' 2\")   Wt 99.7 kg (219 lb 12.8 oz)   LMP  (LMP Unknown)   SpO2 92%   BMI 40.20 kg/m²      Physical Examination:    Physical Exam   Constitutional: No distress. She appears chronically ill and acutely ill.   HENT:   Normal cephalic and Atraumatic   Eyes: Pupils are equal, round, 
heard.  Pulmonary/Chest: Effort normal and breath sounds normal. She has no wheezes. She has no rales. She exhibits no tenderness.   Abdominal: Soft. Bowel sounds are normal. There is no abdominal tenderness.   Musculoskeletal:         General: Edema present. No tenderness. Normal range of motion.      Cervical back: Normal range of motion and neck supple.   Neurological: She is alert and oriented to person, place, and time.   Skin: Skin is warm. No cyanosis. Nails show no clubbing.         Results/ Medications reviewed 8/31/2024, 8:28 AM     Laboratory, Microbiology, Pathology, Radiology, Cardiology, Medications and Transcriptions reviewed  Scheduled Meds:   enoxaparin  1 mg/kg SubCUTAneous Daily    sodium chloride flush  5-40 mL IntraVENous 2 times per day    furosemide  40 mg IntraVENous BID    metoprolol succinate  100 mg Oral Daily    losartan  50 mg Oral BID    clonazePAM  0.5 mg Oral BID    levothyroxine  25 mcg Oral Daily    sertraline  100 mg Oral Daily    sertraline  75 mg Oral Daily    buPROPion  150 mg Oral Daily     Continuous Infusions:   sodium chloride         Recent Labs     08/30/24  0729   WBC 7.5   HGB 14.0   HCT 41.3   MCV 98.6*        Recent Labs     08/30/24  0729 08/31/24  0533    145*   K 4.3 3.5    102   CO2 29 32*   BUN 16 20   CREATININE 1.12* 1.32*     Recent Labs     08/30/24  0729   AST 25   ALT 25   BILITOT 0.8*   ALKPHOS 78     No results for input(s): \"LIPASE\", \"AMYLASE\" in the last 72 hours.  Recent Labs     08/30/24  0729   INR 1.2     XR CHEST PORTABLE    Result Date: 8/30/2024  EXAMINATION: ONE XRAY VIEW OF THE CHEST 8/30/2024 7:57 am COMPARISON: 01/29/2024 HISTORY: ORDERING SYSTEM PROVIDED HISTORY: chest pain, SOB TECHNOLOGIST PROVIDED HISTORY: Reason for exam:->chest pain, SOB What reading provider will be dictating this exam?->CRC FINDINGS: Portable chest reveals heart to be enlarged.  Chronic interstitial changes seen throughout the lung fields bilaterally 
management, Patient/Caregiver education & training, Safety education & training, Equipment evaluation, education, & procurement, Self-Care / ADL, Home management training, Cognitive/Perceptual training    Goals:   Patient will:    - Improve functional endurance to tolerate/complete 30 mins of ADL's  - Be SBA in UB ADLs   - Be SBA in LB ADLs  - Be SBA in ADL transfers without LOB  - Be SBA in toileting tasks  - Improve B UE strength and endurance to 4/5 in order to participate in self-care activities as projected.  - Access appropriate D/C site with as few architectural barriers as possible.  - Sequence self-care tasks with no verbal cues for safety    Patient Goal: Patient goals : \"I want to be able to walk so I can go back to my apartment\"     Discussed and agreed upon: Yes Comments:       Therapy Time:   Individual   Time In 1332   Time Out 1342   Minutes 10     Eval: 10 minutes     Electronically signed by:    ALYSSIA Wilde,   9/3/2024, 1:50 PM

## 2024-09-03 NOTE — FLOWSHEET NOTE
0825- AM assessment completed. Patient resting in bed at this time. Denies any chest pain. Remains atrial fibrillation on the monitor. +1 non-pitting edema noted to bilateral lower extremities. Pedal pulses palpable. Shortness of breath noted with exertion. Lungs are diminished bilaterally. SATS 96% on 1L NC. She is A/Ox3 but is forgetful at times. She can be up with a 1-2 person assist in the room as tolerated secondary to weakness. Denies any pain with elimination. Assisted to bedside commode to have a BM. Zinc cream applied liberally to buttocks. New pure wick catheter then placed along with a new depends. Skin remains warm and pink. Patient then assisted to reclining chair to rest. Generalized bruising noted to upper extremities and discoloration noted to buttocks. #20g SL to left AC, flushed and patent. Vital signs stable and AM medication provided. Has no complaints or concerns at this time. TV on and call light remains in reach.     1030- Patient remains in the reclining chair at this time. Denies any pain. No signs of any acute distress noted. TV remains on and call light remains in reach.     1400- Patient asleep in the chair at this time. Reclined back with legs elevated, and no signs of any acute distress noted. Respirations remain even and unlabored. Call light remains in reach.     1656- Attempted to call report to rehab. Transferred to receiving RN but unable to connect with RN.    1735- Attempted to call report to rehab. Still unable to connect with receiving RN. Patterson stated she would have the RN call me back.     1745- Report provided to DENISE Jones, via phone.     1750- Telemetry monitor removed after clarifying with Dr Bass that patient does not need it while in rehab. Belongings packed up. Daughter at bedside. Aware of transfer to room 261.     1820- Patient transferred to room 261 via wheelchair. Daughter present upon transfer.    Electronically signed by Fabby Zuniga RN on 9/3/2024

## 2024-09-03 NOTE — CARE COORDINATION
Inpatient Rehab referral received. Met with patient while she was sitting up in her recliner. Discussed [facility] and patient tells me she needs therapy to get her legs stronger prior to returning to her AL. I reviewed and explained [facility] program and requirements, including 3 hours of intense therapy daily, anticipated length of stay, weekly team meetings and goal of discharge back to her A.L. Patient denied having any questions. She reports to having a walker with wheels she uses and does have hx of falling. She is currently wearing o2 that she does not have at home. She reports to overall feeling much better compared to when she came into hospital. [place] of choice provided and [facility]. OT eval pending at this time.  Electronically signed by Sheridan Jimenez on 9/3/2024 at 10:24 AM

## 2024-09-03 NOTE — PLAN OF CARE
Problem: Discharge Planning  Goal: Discharge to home or other facility with appropriate resources  8/31/2024 0420 by Therese Sethi RN  Outcome: Progressing  8/30/2024 1629 by Fabby Zuniga RN  Outcome: Progressing     Problem: Skin/Tissue Integrity  Goal: Absence of new skin breakdown  Description: 1.  Monitor for areas of redness and/or skin breakdown  2.  Assess vascular access sites hourly  3.  Every 4-6 hours minimum:  Change oxygen saturation probe site  4.  Every 4-6 hours:  If on nasal continuous positive airway pressure, respiratory therapy assess nares and determine need for appliance change or resting period.  8/31/2024 0420 by Therese Sethi RN  Outcome: Progressing  8/30/2024 1629 by Fabby Zuniga RN  Outcome: Progressing     Problem: Safety - Adult  Goal: Free from fall injury  8/31/2024 0420 by Therese Sethi RN  Outcome: Progressing  8/30/2024 1629 by Fabby Zuniga RN  Outcome: Progressing     Problem: ABCDS Injury Assessment  Goal: Absence of physical injury  8/31/2024 0420 by Therese Sethi RN  Outcome: Progressing  8/30/2024 1629 by Fabby Zuniga RN  Outcome: Progressing     Problem: Respiratory - Adult  Goal: Achieves optimal ventilation and oxygenation  8/31/2024 0420 by Therese Sethi RN  Outcome: Progressing  8/30/2024 1629 by Fabby Zuniga RN  Outcome: Progressing     Problem: Cardiovascular - Adult  Goal: Maintains optimal cardiac output and hemodynamic stability  8/31/2024 0420 by Therese Sethi RN  Outcome: Progressing  8/30/2024 1629 by Fabby Zuniga RN  Outcome: Progressing  Goal: Absence of cardiac dysrhythmias or at baseline  8/31/2024 0420 by Therese Sethi RN  Outcome: Progressing  8/30/2024 1629 by Fabby Zuniga RN  Outcome: Progressing     Problem: Skin/Tissue Integrity - Adult  Goal: Skin integrity remains intact  8/31/2024 0420 by Therese Sethi RN  Outcome: Progressing  8/30/2024 1629 by Fabby Zuniga RN  Outcome: 
  Problem: Discharge Planning  Goal: Discharge to home or other facility with appropriate resources  8/31/2024 1107 by Jeana Singleton RN  Outcome: Progressing  Flowsheets (Taken 8/31/2024 0800)  Discharge to home or other facility with appropriate resources:   Identify barriers to discharge with patient and caregiver   Arrange for needed discharge resources and transportation as appropriate  8/31/2024 0420 by Therese Sethi RN  Outcome: Progressing     Problem: Skin/Tissue Integrity  Goal: Absence of new skin breakdown  Description: 1.  Monitor for areas of redness and/or skin breakdown  2.  Assess vascular access sites hourly  3.  Every 4-6 hours minimum:  Change oxygen saturation probe site  4.  Every 4-6 hours:  If on nasal continuous positive airway pressure, respiratory therapy assess nares and determine need for appliance change or resting period.  8/31/2024 1107 by Jeana Singleton RN  Outcome: Progressing  8/31/2024 0420 by Therese Sethi RN  Outcome: Progressing     Problem: Safety - Adult  Goal: Free from fall injury  8/31/2024 1107 by Jeana Singleton RN  Outcome: Progressing  8/31/2024 0420 by Therese Sethi RN  Outcome: Progressing     Problem: ABCDS Injury Assessment  Goal: Absence of physical injury  8/31/2024 1107 by Jeana Singleton RN  Outcome: Progressing  8/31/2024 0420 by Therese Sethi RN  Outcome: Progressing     Problem: Respiratory - Adult  Goal: Achieves optimal ventilation and oxygenation  8/31/2024 1107 by Jeana Singleton RN  Outcome: Progressing  Flowsheets (Taken 8/31/2024 0800)  Achieves optimal ventilation and oxygenation:   Assess for changes in respiratory status   Assess for changes in mentation and behavior   Position to facilitate oxygenation and minimize respiratory effort  8/31/2024 0420 by Therese Sethi RN  Outcome: Progressing     Problem: Cardiovascular - Adult  Goal: Maintains optimal cardiac output and hemodynamic stability  8/31/2024 1107 by Mani 
  Problem: Discharge Planning  Goal: Discharge to home or other facility with appropriate resources  9/1/2024 1110 by Jeana Singleton RN  Outcome: Progressing  Flowsheets (Taken 9/1/2024 0907)  Discharge to home or other facility with appropriate resources:   Identify barriers to discharge with patient and caregiver   Arrange for needed discharge resources and transportation as appropriate   Identify discharge learning needs (meds, wound care, etc)  8/31/2024 2226 by Therese Sethi RN  Outcome: Progressing     Problem: Skin/Tissue Integrity  Goal: Absence of new skin breakdown  Description: 1.  Monitor for areas of redness and/or skin breakdown  2.  Assess vascular access sites hourly  3.  Every 4-6 hours minimum:  Change oxygen saturation probe site  4.  Every 4-6 hours:  If on nasal continuous positive airway pressure, respiratory therapy assess nares and determine need for appliance change or resting period.  9/1/2024 1110 by Jeana Singleton RN  Outcome: Progressing  8/31/2024 2226 by Therese Sethi RN  Outcome: Progressing     Problem: Safety - Adult  Goal: Free from fall injury  9/1/2024 1110 by Jeana Singleton RN  Outcome: Progressing  8/31/2024 2226 by Therese Sethi RN  Outcome: Progressing     Problem: ABCDS Injury Assessment  Goal: Absence of physical injury  9/1/2024 1110 by Jeana Singleton RN  Outcome: Progressing  8/31/2024 2226 by Therese Sethi RN  Outcome: Progressing     Problem: Respiratory - Adult  Goal: Achieves optimal ventilation and oxygenation  9/1/2024 1110 by Jeana Singleton RN  Outcome: Progressing  Flowsheets (Taken 9/1/2024 0907)  Achieves optimal ventilation and oxygenation:   Assess for changes in respiratory status   Position to facilitate oxygenation and minimize respiratory effort  8/31/2024 2226 by Therese Sethi RN  Outcome: Progressing     Problem: Cardiovascular - Adult  Goal: Maintains optimal cardiac output and hemodynamic stability  9/1/2024 1110 by 
  Problem: Discharge Planning  Goal: Discharge to home or other facility with appropriate resources  Outcome: Progressing  Flowsheets (Taken 9/1/2024 2100)  Discharge to home or other facility with appropriate resources:   Identify barriers to discharge with patient and caregiver   Arrange for needed discharge resources and transportation as appropriate   Identify discharge learning needs (meds, wound care, etc)   Refer to discharge planning if patient needs post-hospital services based on physician order or complex needs related to functional status, cognitive ability or social support system     Problem: Skin/Tissue Integrity  Goal: Absence of new skin breakdown  Description: 1.  Monitor for areas of redness and/or skin breakdown  2.  Assess vascular access sites hourly  3.  Every 4-6 hours minimum:  Change oxygen saturation probe site  4.  Every 4-6 hours:  If on nasal continuous positive airway pressure, respiratory therapy assess nares and determine need for appliance change or resting period.  Outcome: Progressing     Problem: Safety - Adult  Goal: Free from fall injury  Outcome: Progressing     Problem: ABCDS Injury Assessment  Goal: Absence of physical injury  Outcome: Progressing     Problem: Respiratory - Adult  Goal: Achieves optimal ventilation and oxygenation  Outcome: Progressing  Flowsheets (Taken 9/1/2024 2100)  Achieves optimal ventilation and oxygenation:   Assess for changes in respiratory status   Assess for changes in mentation and behavior   Position to facilitate oxygenation and minimize respiratory effort   Oxygen supplementation based on oxygen saturation or arterial blood gases   Encourage broncho-pulmonary hygiene including cough, deep breathe, incentive spirometry   Assess and instruct to report shortness of breath or any respiratory difficulty   Respiratory therapy support as indicated     Problem: Cardiovascular - Adult  Goal: Maintains optimal cardiac output and hemodynamic 
  Problem: Discharge Planning  Goal: Discharge to home or other facility with appropriate resources  Outcome: Progressing  Flowsheets (Taken 9/2/2024 2100)  Discharge to home or other facility with appropriate resources:   Identify barriers to discharge with patient and caregiver   Arrange for needed discharge resources and transportation as appropriate   Identify discharge learning needs (meds, wound care, etc)   Refer to discharge planning if patient needs post-hospital services based on physician order or complex needs related to functional status, cognitive ability or social support system     Problem: Skin/Tissue Integrity  Goal: Absence of new skin breakdown  Description: 1.  Monitor for areas of redness and/or skin breakdown  2.  Assess vascular access sites hourly  3.  Every 4-6 hours minimum:  Change oxygen saturation probe site  4.  Every 4-6 hours:  If on nasal continuous positive airway pressure, respiratory therapy assess nares and determine need for appliance change or resting period.  Outcome: Progressing     Problem: Safety - Adult  Goal: Free from fall injury  Outcome: Progressing     Problem: ABCDS Injury Assessment  Goal: Absence of physical injury  Outcome: Progressing     Problem: Respiratory - Adult  Goal: Achieves optimal ventilation and oxygenation  Outcome: Progressing     Problem: Cardiovascular - Adult  Goal: Maintains optimal cardiac output and hemodynamic stability  Outcome: Progressing  Goal: Absence of cardiac dysrhythmias or at baseline  Outcome: Progressing     Problem: Skin/Tissue Integrity - Adult  Goal: Skin integrity remains intact  Outcome: Progressing  Goal: Oral mucous membranes remain intact  Outcome: Progressing     Problem: Musculoskeletal - Adult  Goal: Return mobility to safest level of function  Outcome: Progressing  Flowsheets (Taken 9/2/2024 2100)  Return Mobility to Safest Level of Function:   Assess patient stability and activity tolerance for standing, transferring 
Care plan initiated.    Electronically signed by Fabby Zuniga RN on 8/30/2024 at 4:30 PM    
Care plan remains ongoing. Monitor labwork and urine output.    Electronically signed by Fabby Zuniga RN on 9/2/2024 at 8:30 AM    
Care plan remains ongoing. Monitor labwork. Work with PT/OT. Rehab evaluation.     Electronically signed by Fabby Zuniga RN on 9/3/2024 at 8:26 AM    
See OT evaluation for all goals and OT POC. Electronically signed by Rabia Weiss OTR/L on 9/3/2024 at 1:52 PM    
replacements, including repeat lab results as appropriate  Goal: Hemodynamic stability and optimal renal function maintained  8/31/2024 2226 by Therese Sethi, RN  Outcome: Progressing  8/31/2024 1107 by Jeana Singleton, RN  Outcome: Progressing  Flowsheets (Taken 8/31/2024 0800)  Hemodynamic stability and optimal renal function maintained:   Monitor labs and assess for signs and symptoms of volume excess or deficit   Monitor intake, output and patient weight

## 2024-09-04 PROBLEM — Z78.9 IMPAIRED MOBILITY AND ADLS: Status: ACTIVE | Noted: 2024-09-04

## 2024-09-04 PROBLEM — Z74.09 IMPAIRED MOBILITY AND ADLS: Status: ACTIVE | Noted: 2024-09-04

## 2024-09-04 PROCEDURE — 6370000000 HC RX 637 (ALT 250 FOR IP): Performed by: INTERNAL MEDICINE

## 2024-09-04 PROCEDURE — 97166 OT EVAL MOD COMPLEX 45 MIN: CPT

## 2024-09-04 PROCEDURE — 97162 PT EVAL MOD COMPLEX 30 MIN: CPT

## 2024-09-04 PROCEDURE — 97533 SENSORY INTEGRATION: CPT

## 2024-09-04 PROCEDURE — 99223 1ST HOSP IP/OBS HIGH 75: CPT | Performed by: PHYSICAL MEDICINE & REHABILITATION

## 2024-09-04 PROCEDURE — 6370000000 HC RX 637 (ALT 250 FOR IP): Performed by: PHYSICAL MEDICINE & REHABILITATION

## 2024-09-04 PROCEDURE — 2580000003 HC RX 258: Performed by: INTERNAL MEDICINE

## 2024-09-04 PROCEDURE — 2700000000 HC OXYGEN THERAPY PER DAY

## 2024-09-04 PROCEDURE — 97116 GAIT TRAINING THERAPY: CPT

## 2024-09-04 PROCEDURE — 97129 THER IVNTJ 1ST 15 MIN: CPT

## 2024-09-04 PROCEDURE — 99213 OFFICE O/P EST LOW 20 MIN: CPT

## 2024-09-04 PROCEDURE — 1180000000 HC REHAB R&B

## 2024-09-04 PROCEDURE — 97112 NEUROMUSCULAR REEDUCATION: CPT

## 2024-09-04 PROCEDURE — 6360000002 HC RX W HCPCS: Performed by: PHYSICAL MEDICINE & REHABILITATION

## 2024-09-04 PROCEDURE — 97110 THERAPEUTIC EXERCISES: CPT

## 2024-09-04 PROCEDURE — 97535 SELF CARE MNGMENT TRAINING: CPT

## 2024-09-04 RX ORDER — BISACODYL 10 MG
10 SUPPOSITORY, RECTAL RECTAL DAILY PRN
Status: DISCONTINUED | OUTPATIENT
Start: 2024-09-04 | End: 2024-09-09 | Stop reason: HOSPADM

## 2024-09-04 RX ORDER — LIDOCAINE 4 G/G
3 PATCH TOPICAL DAILY
Status: DISCONTINUED | OUTPATIENT
Start: 2024-09-04 | End: 2024-09-09 | Stop reason: HOSPADM

## 2024-09-04 RX ORDER — VITAMIN B COMPLEX
2000 TABLET ORAL
Status: DISCONTINUED | OUTPATIENT
Start: 2024-09-04 | End: 2024-09-09 | Stop reason: HOSPADM

## 2024-09-04 RX ORDER — ACETAMINOPHEN 325 MG/1
650 TABLET ORAL EVERY 4 HOURS PRN
Status: DISCONTINUED | OUTPATIENT
Start: 2024-09-04 | End: 2024-09-09 | Stop reason: HOSPADM

## 2024-09-04 RX ORDER — CYANOCOBALAMIN 1000 UG/ML
1000 INJECTION, SOLUTION INTRAMUSCULAR; SUBCUTANEOUS WEEKLY
Status: DISCONTINUED | OUTPATIENT
Start: 2024-09-04 | End: 2024-09-09 | Stop reason: HOSPADM

## 2024-09-04 RX ORDER — UBIDECARENONE 100 MG
100 CAPSULE ORAL DAILY
Status: DISCONTINUED | OUTPATIENT
Start: 2024-09-04 | End: 2024-09-09 | Stop reason: HOSPADM

## 2024-09-04 RX ADMIN — Medication 10 ML: at 08:41

## 2024-09-04 RX ADMIN — PANTOPRAZOLE SODIUM 40 MG: 40 TABLET, DELAYED RELEASE ORAL at 05:58

## 2024-09-04 RX ADMIN — BUPROPION HYDROCHLORIDE 150 MG: 150 TABLET, FILM COATED, EXTENDED RELEASE ORAL at 08:33

## 2024-09-04 RX ADMIN — Medication 100 MG: at 11:37

## 2024-09-04 RX ADMIN — SERTRALINE 100 MG: 100 TABLET, FILM COATED ORAL at 08:33

## 2024-09-04 RX ADMIN — CYANOCOBALAMIN 1000 MCG: 1000 INJECTION, SOLUTION INTRAMUSCULAR; SUBCUTANEOUS at 11:37

## 2024-09-04 RX ADMIN — CLONAZEPAM 0.5 MG: 0.5 TABLET ORAL at 08:34

## 2024-09-04 RX ADMIN — LEVOTHYROXINE SODIUM 25 MCG: 0.03 TABLET ORAL at 05:58

## 2024-09-04 RX ADMIN — METOPROLOL SUCCINATE 100 MG: 100 TABLET, EXTENDED RELEASE ORAL at 08:34

## 2024-09-04 RX ADMIN — APIXABAN 2.5 MG: 2.5 TABLET, FILM COATED ORAL at 22:25

## 2024-09-04 RX ADMIN — LOSARTAN POTASSIUM 50 MG: 50 TABLET, FILM COATED ORAL at 08:33

## 2024-09-04 RX ADMIN — CLONAZEPAM 0.5 MG: 0.5 TABLET ORAL at 22:24

## 2024-09-04 RX ADMIN — Medication 2000 UNITS: at 16:08

## 2024-09-04 RX ADMIN — ACETAMINOPHEN 325MG 650 MG: 325 TABLET ORAL at 13:22

## 2024-09-04 RX ADMIN — ASPIRIN 81 MG: 81 TABLET, COATED ORAL at 08:33

## 2024-09-04 RX ADMIN — FUROSEMIDE 40 MG: 40 TABLET ORAL at 08:34

## 2024-09-04 RX ADMIN — LOSARTAN POTASSIUM 50 MG: 50 TABLET, FILM COATED ORAL at 22:24

## 2024-09-04 RX ADMIN — APIXABAN 2.5 MG: 2.5 TABLET, FILM COATED ORAL at 08:33

## 2024-09-04 RX ADMIN — SPIRONOLACTONE 25 MG: 25 TABLET ORAL at 08:33

## 2024-09-04 RX ADMIN — SERTRALINE HYDROCHLORIDE 75 MG: 25 TABLET ORAL at 08:35

## 2024-09-04 ASSESSMENT — PAIN DESCRIPTION - DESCRIPTORS
DESCRIPTORS: SORE
DESCRIPTORS: SORE

## 2024-09-04 ASSESSMENT — PAIN DESCRIPTION - LOCATION
LOCATION: BACK;HIP
LOCATION: BUTTOCKS;RECTUM

## 2024-09-04 ASSESSMENT — PAIN SCALES - GENERAL
PAINLEVEL_OUTOF10: 2
PAINLEVEL_OUTOF10: 7

## 2024-09-04 ASSESSMENT — PAIN DESCRIPTION - ORIENTATION: ORIENTATION: LEFT

## 2024-09-05 LAB
ANION GAP SERPL CALCULATED.3IONS-SCNC: 12 MEQ/L (ref 9–15)
BACTERIA URNS QL MICRO: ABNORMAL /HPF
BASOPHILS # BLD: 0 K/UL (ref 0–0.2)
BASOPHILS NFR BLD: 0.5 %
BILIRUB UR QL STRIP: NEGATIVE
BNP BLD-MCNC: 6745 PG/ML
BUN SERPL-MCNC: 30 MG/DL (ref 8–23)
CALCIUM SERPL-MCNC: 9.3 MG/DL (ref 8.5–9.9)
CHLORIDE SERPL-SCNC: 99 MEQ/L (ref 95–107)
CLARITY UR: CLEAR
CO2 SERPL-SCNC: 28 MEQ/L (ref 20–31)
COLOR UR: YELLOW
CREAT SERPL-MCNC: 1.42 MG/DL (ref 0.5–0.9)
EOSINOPHIL # BLD: 0.1 K/UL (ref 0–0.7)
EOSINOPHIL NFR BLD: 2.3 %
EPI CELLS #/AREA URNS AUTO: ABNORMAL /HPF (ref 0–5)
ERYTHROCYTE [DISTWIDTH] IN BLOOD BY AUTOMATED COUNT: 13.2 % (ref 11.5–14.5)
GLUCOSE SERPL-MCNC: 99 MG/DL (ref 70–99)
GLUCOSE UR STRIP-MCNC: NEGATIVE MG/DL
HCT VFR BLD AUTO: 37.3 % (ref 37–47)
HGB BLD-MCNC: 12.6 G/DL (ref 12–16)
HGB UR QL STRIP: NEGATIVE
HYALINE CASTS #/AREA URNS AUTO: ABNORMAL /HPF (ref 0–5)
KETONES UR STRIP-MCNC: NEGATIVE MG/DL
LEUKOCYTE ESTERASE UR QL STRIP: ABNORMAL
LYMPHOCYTES # BLD: 1.2 K/UL (ref 1–4.8)
LYMPHOCYTES NFR BLD: 20 %
MCH RBC QN AUTO: 32.6 PG (ref 27–31.3)
MCHC RBC AUTO-ENTMCNC: 33.8 % (ref 33–37)
MCV RBC AUTO: 96.6 FL (ref 79.4–94.8)
MONOCYTES # BLD: 0.6 K/UL (ref 0.2–0.8)
MONOCYTES NFR BLD: 9.6 %
NEUTROPHILS # BLD: 3.9 K/UL (ref 1.4–6.5)
NEUTS SEG NFR BLD: 67.4 %
NITRITE UR QL STRIP: NEGATIVE
PH UR STRIP: 5.5 [PH] (ref 5–9)
PLATELET # BLD AUTO: 130 K/UL (ref 130–400)
POTASSIUM SERPL-SCNC: 3.7 MEQ/L (ref 3.4–4.9)
PROT UR STRIP-MCNC: NEGATIVE MG/DL
RBC # BLD AUTO: 3.86 M/UL (ref 4.2–5.4)
RBC #/AREA URNS AUTO: ABNORMAL /HPF (ref 0–5)
SODIUM SERPL-SCNC: 139 MEQ/L (ref 135–144)
SP GR UR STRIP: 1.01 (ref 1–1.03)
TSH SERPL-MCNC: 4.47 UIU/ML (ref 0.44–3.86)
URINE REFLEX TO CULTURE: ABNORMAL
UROBILINOGEN UR STRIP-ACNC: 0.2 E.U./DL
WBC # BLD AUTO: 5.7 K/UL (ref 4.8–10.8)
WBC #/AREA URNS AUTO: ABNORMAL /HPF (ref 0–5)

## 2024-09-05 PROCEDURE — 92523 SPEECH SOUND LANG COMPREHEN: CPT

## 2024-09-05 PROCEDURE — 80048 BASIC METABOLIC PNL TOTAL CA: CPT

## 2024-09-05 PROCEDURE — 83880 ASSAY OF NATRIURETIC PEPTIDE: CPT

## 2024-09-05 PROCEDURE — 97530 THERAPEUTIC ACTIVITIES: CPT

## 2024-09-05 PROCEDURE — 99222 1ST HOSP IP/OBS MODERATE 55: CPT | Performed by: INTERNAL MEDICINE

## 2024-09-05 PROCEDURE — 36415 COLL VENOUS BLD VENIPUNCTURE: CPT

## 2024-09-05 PROCEDURE — 97116 GAIT TRAINING THERAPY: CPT

## 2024-09-05 PROCEDURE — 92610 EVALUATE SWALLOWING FUNCTION: CPT

## 2024-09-05 PROCEDURE — 97110 THERAPEUTIC EXERCISES: CPT

## 2024-09-05 PROCEDURE — 1180000000 HC REHAB R&B

## 2024-09-05 PROCEDURE — 97112 NEUROMUSCULAR REEDUCATION: CPT

## 2024-09-05 PROCEDURE — 99233 SBSQ HOSP IP/OBS HIGH 50: CPT | Performed by: PHYSICAL MEDICINE & REHABILITATION

## 2024-09-05 PROCEDURE — 6370000000 HC RX 637 (ALT 250 FOR IP): Performed by: PHYSICAL MEDICINE & REHABILITATION

## 2024-09-05 PROCEDURE — 6370000000 HC RX 637 (ALT 250 FOR IP): Performed by: INTERNAL MEDICINE

## 2024-09-05 PROCEDURE — 97535 SELF CARE MNGMENT TRAINING: CPT

## 2024-09-05 PROCEDURE — 85025 COMPLETE CBC W/AUTO DIFF WBC: CPT

## 2024-09-05 PROCEDURE — 81001 URINALYSIS AUTO W/SCOPE: CPT

## 2024-09-05 PROCEDURE — 84443 ASSAY THYROID STIM HORMONE: CPT

## 2024-09-05 RX ORDER — MUPIROCIN 20 MG/G
OINTMENT TOPICAL 2 TIMES DAILY
Status: DISCONTINUED | OUTPATIENT
Start: 2024-09-05 | End: 2024-09-09 | Stop reason: HOSPADM

## 2024-09-05 RX ORDER — OXYMETAZOLINE HYDROCHLORIDE 0.05 G/100ML
2 SPRAY NASAL 2 TIMES DAILY
Status: DISPENSED | OUTPATIENT
Start: 2024-09-05 | End: 2024-09-08

## 2024-09-05 RX ADMIN — LEVOTHYROXINE SODIUM 25 MCG: 0.03 TABLET ORAL at 05:17

## 2024-09-05 RX ADMIN — Medication 100 MG: at 10:27

## 2024-09-05 RX ADMIN — BUPROPION HYDROCHLORIDE 150 MG: 150 TABLET, FILM COATED, EXTENDED RELEASE ORAL at 10:28

## 2024-09-05 RX ADMIN — SPIRONOLACTONE 25 MG: 25 TABLET ORAL at 10:28

## 2024-09-05 RX ADMIN — CLONAZEPAM 0.5 MG: 0.5 TABLET ORAL at 20:20

## 2024-09-05 RX ADMIN — SERTRALINE HYDROCHLORIDE 75 MG: 25 TABLET ORAL at 10:30

## 2024-09-05 RX ADMIN — Medication 2000 UNITS: at 18:35

## 2024-09-05 RX ADMIN — PANTOPRAZOLE SODIUM 40 MG: 40 TABLET, DELAYED RELEASE ORAL at 05:17

## 2024-09-05 RX ADMIN — CLONAZEPAM 0.5 MG: 0.5 TABLET ORAL at 10:27

## 2024-09-05 RX ADMIN — MUPIROCIN: 20 OINTMENT TOPICAL at 18:41

## 2024-09-05 RX ADMIN — SERTRALINE 100 MG: 100 TABLET, FILM COATED ORAL at 10:27

## 2024-09-05 RX ADMIN — APIXABAN 2.5 MG: 2.5 TABLET, FILM COATED ORAL at 20:20

## 2024-09-05 RX ADMIN — LOSARTAN POTASSIUM 50 MG: 50 TABLET, FILM COATED ORAL at 20:20

## 2024-09-05 RX ADMIN — LOSARTAN POTASSIUM 50 MG: 50 TABLET, FILM COATED ORAL at 10:27

## 2024-09-05 RX ADMIN — APIXABAN 2.5 MG: 2.5 TABLET, FILM COATED ORAL at 10:27

## 2024-09-05 RX ADMIN — ASPIRIN 81 MG: 81 TABLET, COATED ORAL at 10:27

## 2024-09-05 RX ADMIN — METOPROLOL SUCCINATE 100 MG: 100 TABLET, EXTENDED RELEASE ORAL at 10:27

## 2024-09-05 RX ADMIN — FUROSEMIDE 40 MG: 40 TABLET ORAL at 10:27

## 2024-09-06 PROCEDURE — 97130 THER IVNTJ EA ADDL 15 MIN: CPT

## 2024-09-06 PROCEDURE — 97530 THERAPEUTIC ACTIVITIES: CPT

## 2024-09-06 PROCEDURE — 99232 SBSQ HOSP IP/OBS MODERATE 35: CPT | Performed by: INTERNAL MEDICINE

## 2024-09-06 PROCEDURE — 6370000000 HC RX 637 (ALT 250 FOR IP): Performed by: INTERNAL MEDICINE

## 2024-09-06 PROCEDURE — 97110 THERAPEUTIC EXERCISES: CPT

## 2024-09-06 PROCEDURE — 99232 SBSQ HOSP IP/OBS MODERATE 35: CPT | Performed by: PHYSICAL MEDICINE & REHABILITATION

## 2024-09-06 PROCEDURE — 97535 SELF CARE MNGMENT TRAINING: CPT

## 2024-09-06 PROCEDURE — 97116 GAIT TRAINING THERAPY: CPT

## 2024-09-06 PROCEDURE — 97112 NEUROMUSCULAR REEDUCATION: CPT

## 2024-09-06 PROCEDURE — 97129 THER IVNTJ 1ST 15 MIN: CPT

## 2024-09-06 PROCEDURE — 1180000000 HC REHAB R&B

## 2024-09-06 PROCEDURE — 6370000000 HC RX 637 (ALT 250 FOR IP): Performed by: PHYSICAL MEDICINE & REHABILITATION

## 2024-09-06 RX ADMIN — LOSARTAN POTASSIUM 50 MG: 50 TABLET, FILM COATED ORAL at 22:12

## 2024-09-06 RX ADMIN — ACETAMINOPHEN 325MG 650 MG: 325 TABLET ORAL at 22:12

## 2024-09-06 RX ADMIN — SPIRONOLACTONE 25 MG: 25 TABLET ORAL at 07:41

## 2024-09-06 RX ADMIN — SERTRALINE HYDROCHLORIDE 75 MG: 25 TABLET ORAL at 07:42

## 2024-09-06 RX ADMIN — PANTOPRAZOLE SODIUM 40 MG: 40 TABLET, DELAYED RELEASE ORAL at 05:47

## 2024-09-06 RX ADMIN — Medication 2000 UNITS: at 16:44

## 2024-09-06 RX ADMIN — METOPROLOL SUCCINATE 100 MG: 100 TABLET, EXTENDED RELEASE ORAL at 07:41

## 2024-09-06 RX ADMIN — ACETAMINOPHEN 325MG 650 MG: 325 TABLET ORAL at 12:51

## 2024-09-06 RX ADMIN — SERTRALINE 100 MG: 100 TABLET, FILM COATED ORAL at 07:40

## 2024-09-06 RX ADMIN — CLONAZEPAM 0.5 MG: 0.5 TABLET ORAL at 07:41

## 2024-09-06 RX ADMIN — APIXABAN 2.5 MG: 2.5 TABLET, FILM COATED ORAL at 07:41

## 2024-09-06 RX ADMIN — CLONAZEPAM 0.5 MG: 0.5 TABLET ORAL at 22:12

## 2024-09-06 RX ADMIN — Medication 100 MG: at 07:41

## 2024-09-06 RX ADMIN — ACETAMINOPHEN 325MG 650 MG: 325 TABLET ORAL at 07:48

## 2024-09-06 RX ADMIN — APIXABAN 2.5 MG: 2.5 TABLET, FILM COATED ORAL at 22:12

## 2024-09-06 RX ADMIN — LOSARTAN POTASSIUM 50 MG: 50 TABLET, FILM COATED ORAL at 07:41

## 2024-09-06 RX ADMIN — FUROSEMIDE 40 MG: 40 TABLET ORAL at 07:41

## 2024-09-06 RX ADMIN — ASPIRIN 81 MG: 81 TABLET, COATED ORAL at 07:41

## 2024-09-06 RX ADMIN — BUPROPION HYDROCHLORIDE 150 MG: 150 TABLET, FILM COATED, EXTENDED RELEASE ORAL at 07:40

## 2024-09-06 RX ADMIN — LEVOTHYROXINE SODIUM 25 MCG: 0.03 TABLET ORAL at 05:47

## 2024-09-06 ASSESSMENT — PAIN DESCRIPTION - ORIENTATION
ORIENTATION: LOWER;LEFT
ORIENTATION: RIGHT
ORIENTATION: MID

## 2024-09-06 ASSESSMENT — PAIN DESCRIPTION - DESCRIPTORS
DESCRIPTORS: ACHING;DISCOMFORT
DESCRIPTORS: ACHING;SORE
DESCRIPTORS: SORE

## 2024-09-06 ASSESSMENT — PAIN DESCRIPTION - LOCATION
LOCATION: HIP
LOCATION: GENERALIZED
LOCATION: BACK

## 2024-09-06 ASSESSMENT — PAIN SCALES - GENERAL
PAINLEVEL_OUTOF10: 4

## 2024-09-07 PROCEDURE — 97116 GAIT TRAINING THERAPY: CPT

## 2024-09-07 PROCEDURE — 97110 THERAPEUTIC EXERCISES: CPT

## 2024-09-07 PROCEDURE — 6370000000 HC RX 637 (ALT 250 FOR IP): Performed by: INTERNAL MEDICINE

## 2024-09-07 PROCEDURE — 1180000000 HC REHAB R&B

## 2024-09-07 PROCEDURE — 97535 SELF CARE MNGMENT TRAINING: CPT

## 2024-09-07 PROCEDURE — 6370000000 HC RX 637 (ALT 250 FOR IP): Performed by: PHYSICAL MEDICINE & REHABILITATION

## 2024-09-07 RX ADMIN — BUPROPION HYDROCHLORIDE 150 MG: 150 TABLET, FILM COATED, EXTENDED RELEASE ORAL at 09:42

## 2024-09-07 RX ADMIN — APIXABAN 2.5 MG: 2.5 TABLET, FILM COATED ORAL at 09:42

## 2024-09-07 RX ADMIN — PANTOPRAZOLE SODIUM 40 MG: 40 TABLET, DELAYED RELEASE ORAL at 05:51

## 2024-09-07 RX ADMIN — FUROSEMIDE 40 MG: 40 TABLET ORAL at 09:42

## 2024-09-07 RX ADMIN — SPIRONOLACTONE 25 MG: 25 TABLET ORAL at 09:41

## 2024-09-07 RX ADMIN — SERTRALINE 100 MG: 100 TABLET, FILM COATED ORAL at 09:41

## 2024-09-07 RX ADMIN — LEVOTHYROXINE SODIUM 25 MCG: 0.03 TABLET ORAL at 05:51

## 2024-09-07 RX ADMIN — MUPIROCIN: 20 OINTMENT TOPICAL at 09:48

## 2024-09-07 RX ADMIN — LOSARTAN POTASSIUM 50 MG: 50 TABLET, FILM COATED ORAL at 09:42

## 2024-09-07 RX ADMIN — CLONAZEPAM 0.5 MG: 0.5 TABLET ORAL at 21:14

## 2024-09-07 RX ADMIN — ASPIRIN 81 MG: 81 TABLET, COATED ORAL at 09:42

## 2024-09-07 RX ADMIN — Medication 100 MG: at 09:42

## 2024-09-07 RX ADMIN — METOPROLOL SUCCINATE 100 MG: 100 TABLET, EXTENDED RELEASE ORAL at 09:42

## 2024-09-07 RX ADMIN — APIXABAN 2.5 MG: 2.5 TABLET, FILM COATED ORAL at 21:14

## 2024-09-07 RX ADMIN — LOSARTAN POTASSIUM 50 MG: 50 TABLET, FILM COATED ORAL at 21:14

## 2024-09-07 RX ADMIN — Medication 2000 UNITS: at 17:37

## 2024-09-07 RX ADMIN — ACETAMINOPHEN 325MG 650 MG: 325 TABLET ORAL at 06:41

## 2024-09-07 RX ADMIN — SERTRALINE HYDROCHLORIDE 75 MG: 25 TABLET ORAL at 09:43

## 2024-09-07 RX ADMIN — CLONAZEPAM 0.5 MG: 0.5 TABLET ORAL at 09:41

## 2024-09-07 ASSESSMENT — PAIN DESCRIPTION - LOCATION: LOCATION: GENERALIZED

## 2024-09-07 ASSESSMENT — PAIN SCALES - GENERAL: PAINLEVEL_OUTOF10: 1

## 2024-09-07 ASSESSMENT — PAIN DESCRIPTION - DESCRIPTORS: DESCRIPTORS: ACHING

## 2024-09-08 PROCEDURE — 99232 SBSQ HOSP IP/OBS MODERATE 35: CPT | Performed by: PHYSICAL MEDICINE & REHABILITATION

## 2024-09-08 PROCEDURE — 6370000000 HC RX 637 (ALT 250 FOR IP): Performed by: INTERNAL MEDICINE

## 2024-09-08 PROCEDURE — 97116 GAIT TRAINING THERAPY: CPT

## 2024-09-08 PROCEDURE — 6370000000 HC RX 637 (ALT 250 FOR IP): Performed by: PHYSICAL MEDICINE & REHABILITATION

## 2024-09-08 PROCEDURE — 1180000000 HC REHAB R&B

## 2024-09-08 PROCEDURE — 97535 SELF CARE MNGMENT TRAINING: CPT

## 2024-09-08 RX ADMIN — Medication 2000 UNITS: at 16:46

## 2024-09-08 RX ADMIN — PANTOPRAZOLE SODIUM 40 MG: 40 TABLET, DELAYED RELEASE ORAL at 05:59

## 2024-09-08 RX ADMIN — SERTRALINE HYDROCHLORIDE 75 MG: 25 TABLET ORAL at 07:56

## 2024-09-08 RX ADMIN — APIXABAN 2.5 MG: 2.5 TABLET, FILM COATED ORAL at 21:35

## 2024-09-08 RX ADMIN — LOSARTAN POTASSIUM 50 MG: 50 TABLET, FILM COATED ORAL at 21:35

## 2024-09-08 RX ADMIN — METOPROLOL SUCCINATE 100 MG: 100 TABLET, EXTENDED RELEASE ORAL at 07:57

## 2024-09-08 RX ADMIN — ASPIRIN 81 MG: 81 TABLET, COATED ORAL at 07:54

## 2024-09-08 RX ADMIN — CLONAZEPAM 0.5 MG: 0.5 TABLET ORAL at 21:35

## 2024-09-08 RX ADMIN — SERTRALINE 100 MG: 100 TABLET, FILM COATED ORAL at 07:54

## 2024-09-08 RX ADMIN — CLONAZEPAM 0.5 MG: 0.5 TABLET ORAL at 07:54

## 2024-09-08 RX ADMIN — LEVOTHYROXINE SODIUM 25 MCG: 0.03 TABLET ORAL at 05:59

## 2024-09-08 RX ADMIN — APIXABAN 2.5 MG: 2.5 TABLET, FILM COATED ORAL at 07:54

## 2024-09-08 RX ADMIN — FUROSEMIDE 40 MG: 40 TABLET ORAL at 07:55

## 2024-09-08 RX ADMIN — BUPROPION HYDROCHLORIDE 150 MG: 150 TABLET, FILM COATED, EXTENDED RELEASE ORAL at 07:54

## 2024-09-08 RX ADMIN — LOSARTAN POTASSIUM 50 MG: 50 TABLET, FILM COATED ORAL at 07:57

## 2024-09-08 RX ADMIN — Medication 100 MG: at 07:54

## 2024-09-08 RX ADMIN — ACETAMINOPHEN 325MG 650 MG: 325 TABLET ORAL at 08:56

## 2024-09-08 RX ADMIN — SPIRONOLACTONE 25 MG: 25 TABLET ORAL at 08:48

## 2024-09-08 ASSESSMENT — PAIN SCALES - GENERAL
PAINLEVEL_OUTOF10: 2
PAINLEVEL_OUTOF10: 4

## 2024-09-08 ASSESSMENT — PAIN DESCRIPTION - LOCATION
LOCATION: BUTTOCKS
LOCATION: BUTTOCKS

## 2024-09-08 ASSESSMENT — PAIN DESCRIPTION - DESCRIPTORS
DESCRIPTORS: ACHING
DESCRIPTORS: ACHING;SORE

## 2024-09-08 ASSESSMENT — PAIN DESCRIPTION - ORIENTATION
ORIENTATION: MID
ORIENTATION: MID

## 2024-09-09 VITALS
BODY MASS INDEX: 27.9 KG/M2 | RESPIRATION RATE: 18 BRPM | OXYGEN SATURATION: 96 % | TEMPERATURE: 98.2 F | WEIGHT: 151.6 LBS | HEIGHT: 62 IN | HEART RATE: 70 BPM | DIASTOLIC BLOOD PRESSURE: 77 MMHG | SYSTOLIC BLOOD PRESSURE: 144 MMHG

## 2024-09-09 LAB
ANION GAP SERPL CALCULATED.3IONS-SCNC: 11 MEQ/L (ref 9–15)
BASOPHILS # BLD: 0 K/UL (ref 0–0.2)
BASOPHILS NFR BLD: 0.6 %
BUN SERPL-MCNC: 32 MG/DL (ref 8–23)
CALCIUM SERPL-MCNC: 9.5 MG/DL (ref 8.5–9.9)
CHLORIDE SERPL-SCNC: 101 MEQ/L (ref 95–107)
CO2 SERPL-SCNC: 29 MEQ/L (ref 20–31)
CREAT SERPL-MCNC: 1.55 MG/DL (ref 0.5–0.9)
EOSINOPHIL # BLD: 0.1 K/UL (ref 0–0.7)
EOSINOPHIL NFR BLD: 2.2 %
ERYTHROCYTE [DISTWIDTH] IN BLOOD BY AUTOMATED COUNT: 13.3 % (ref 11.5–14.5)
GLUCOSE SERPL-MCNC: 97 MG/DL (ref 70–99)
HCT VFR BLD AUTO: 37.9 % (ref 37–47)
HGB BLD-MCNC: 13 G/DL (ref 12–16)
LYMPHOCYTES # BLD: 1.1 K/UL (ref 1–4.8)
LYMPHOCYTES NFR BLD: 21.5 %
MCH RBC QN AUTO: 33.2 PG (ref 27–31.3)
MCHC RBC AUTO-ENTMCNC: 34.3 % (ref 33–37)
MCV RBC AUTO: 96.9 FL (ref 79.4–94.8)
MONOCYTES # BLD: 0.5 K/UL (ref 0.2–0.8)
MONOCYTES NFR BLD: 10.3 %
NEUTROPHILS # BLD: 3.3 K/UL (ref 1.4–6.5)
NEUTS SEG NFR BLD: 65 %
PLATELET # BLD AUTO: 144 K/UL (ref 130–400)
POTASSIUM SERPL-SCNC: 3.4 MEQ/L (ref 3.4–4.9)
RBC # BLD AUTO: 3.91 M/UL (ref 4.2–5.4)
SODIUM SERPL-SCNC: 141 MEQ/L (ref 135–144)
WBC # BLD AUTO: 5.1 K/UL (ref 4.8–10.8)

## 2024-09-09 PROCEDURE — 6370000000 HC RX 637 (ALT 250 FOR IP): Performed by: PHYSICAL MEDICINE & REHABILITATION

## 2024-09-09 PROCEDURE — 80048 BASIC METABOLIC PNL TOTAL CA: CPT

## 2024-09-09 PROCEDURE — 99232 SBSQ HOSP IP/OBS MODERATE 35: CPT | Performed by: INTERNAL MEDICINE

## 2024-09-09 PROCEDURE — 97535 SELF CARE MNGMENT TRAINING: CPT

## 2024-09-09 PROCEDURE — 6370000000 HC RX 637 (ALT 250 FOR IP): Performed by: INTERNAL MEDICINE

## 2024-09-09 PROCEDURE — 97116 GAIT TRAINING THERAPY: CPT

## 2024-09-09 PROCEDURE — 36415 COLL VENOUS BLD VENIPUNCTURE: CPT

## 2024-09-09 PROCEDURE — 85025 COMPLETE CBC W/AUTO DIFF WBC: CPT

## 2024-09-09 PROCEDURE — 99239 HOSP IP/OBS DSCHRG MGMT >30: CPT | Performed by: PHYSICAL MEDICINE & REHABILITATION

## 2024-09-09 PROCEDURE — 97112 NEUROMUSCULAR REEDUCATION: CPT

## 2024-09-09 RX ORDER — CHOLECALCIFEROL (VITAMIN D3) 50 MCG
2000 TABLET ORAL
Qty: 60 TABLET | Refills: 0 | Status: SHIPPED | OUTPATIENT
Start: 2024-09-09

## 2024-09-09 RX ORDER — LOSARTAN POTASSIUM 50 MG/1
50 TABLET ORAL DAILY
Qty: 30 TABLET | Refills: 3 | Status: SHIPPED | OUTPATIENT
Start: 2024-09-09

## 2024-09-09 RX ADMIN — Medication 100 MG: at 08:53

## 2024-09-09 RX ADMIN — SERTRALINE 100 MG: 100 TABLET, FILM COATED ORAL at 08:53

## 2024-09-09 RX ADMIN — CLONAZEPAM 0.5 MG: 0.5 TABLET ORAL at 08:54

## 2024-09-09 RX ADMIN — LOSARTAN POTASSIUM 50 MG: 50 TABLET, FILM COATED ORAL at 08:52

## 2024-09-09 RX ADMIN — APIXABAN 2.5 MG: 2.5 TABLET, FILM COATED ORAL at 08:53

## 2024-09-09 RX ADMIN — PANTOPRAZOLE SODIUM 40 MG: 40 TABLET, DELAYED RELEASE ORAL at 05:32

## 2024-09-09 RX ADMIN — ACETAMINOPHEN 325MG 650 MG: 325 TABLET ORAL at 05:31

## 2024-09-09 RX ADMIN — ASPIRIN 81 MG: 81 TABLET, COATED ORAL at 08:52

## 2024-09-09 RX ADMIN — BUPROPION HYDROCHLORIDE 150 MG: 150 TABLET, FILM COATED, EXTENDED RELEASE ORAL at 08:53

## 2024-09-09 RX ADMIN — SERTRALINE HYDROCHLORIDE 75 MG: 25 TABLET ORAL at 08:53

## 2024-09-09 RX ADMIN — FUROSEMIDE 40 MG: 40 TABLET ORAL at 08:53

## 2024-09-09 RX ADMIN — SPIRONOLACTONE 25 MG: 25 TABLET ORAL at 08:54

## 2024-09-09 RX ADMIN — MUPIROCIN: 20 OINTMENT TOPICAL at 08:54

## 2024-09-09 RX ADMIN — LEVOTHYROXINE SODIUM 25 MCG: 0.03 TABLET ORAL at 05:32

## 2024-09-09 RX ADMIN — METOPROLOL SUCCINATE 100 MG: 100 TABLET, EXTENDED RELEASE ORAL at 08:52

## 2024-09-09 ASSESSMENT — ENCOUNTER SYMPTOMS
STRIDOR: 0
NAUSEA: 0
SHORTNESS OF BREATH: 0
BLOOD IN STOOL: 0
CHEST TIGHTNESS: 0
WHEEZING: 0
BACK PAIN: 1
GASTROINTESTINAL NEGATIVE: 1
EYES NEGATIVE: 1
COUGH: 0
RESPIRATORY NEGATIVE: 1

## 2024-09-10 ENCOUNTER — OFFICE VISIT (OUTPATIENT)
Dept: GERIATRIC MEDICINE | Age: 89
End: 2024-09-10
Payer: MEDICARE

## 2024-09-10 DIAGNOSIS — F34.1 DYSTHYMIC DISORDER: ICD-10-CM

## 2024-09-10 DIAGNOSIS — F41.1 GAD (GENERALIZED ANXIETY DISORDER): ICD-10-CM

## 2024-09-10 DIAGNOSIS — I11.0 HYPERTENSIVE HEART DISEASE WITH HEART FAILURE (HCC): Primary | ICD-10-CM

## 2024-09-10 PROCEDURE — 1036F TOBACCO NON-USER: CPT | Performed by: PHYSICIAN ASSISTANT

## 2024-09-10 PROCEDURE — G8417 CALC BMI ABV UP PARAM F/U: HCPCS | Performed by: PHYSICIAN ASSISTANT

## 2024-09-10 PROCEDURE — 99348 HOME/RES VST EST LOW MDM 30: CPT | Performed by: PHYSICIAN ASSISTANT

## 2024-09-10 PROCEDURE — 1123F ACP DISCUSS/DSCN MKR DOCD: CPT | Performed by: PHYSICIAN ASSISTANT

## 2024-09-10 PROCEDURE — 1090F PRES/ABSN URINE INCON ASSESS: CPT | Performed by: PHYSICIAN ASSISTANT

## 2024-09-12 DIAGNOSIS — F32.A DEPRESSION, UNSPECIFIED DEPRESSION TYPE: Primary | ICD-10-CM

## 2024-09-14 RX ORDER — SERTRALINE HYDROCHLORIDE 100 MG/1
TABLET, FILM COATED ORAL
Qty: 60 TABLET | Refills: 5 | Status: SHIPPED | OUTPATIENT
Start: 2024-09-14

## 2024-09-14 ASSESSMENT — ENCOUNTER SYMPTOMS: COLOR CHANGE: 0

## 2024-09-17 ENCOUNTER — OFFICE VISIT (OUTPATIENT)
Dept: GERIATRIC MEDICINE | Age: 89
End: 2024-09-17
Payer: MEDICARE

## 2024-09-17 DIAGNOSIS — F33.9 RECURRENT MAJOR DEPRESSIVE DISORDER, REMISSION STATUS UNSPECIFIED (HCC): Primary | ICD-10-CM

## 2024-09-17 DIAGNOSIS — Z71.89 ENCOUNTER FOR HOSPICE CARE DISCUSSION: ICD-10-CM

## 2024-09-17 PROCEDURE — 99497 ADVNCD CARE PLAN 30 MIN: CPT | Performed by: PHYSICIAN ASSISTANT

## 2024-09-19 ASSESSMENT — ENCOUNTER SYMPTOMS: COLOR CHANGE: 0

## 2024-09-29 ENCOUNTER — HOSPITAL ENCOUNTER (OUTPATIENT)
Age: 89
Setting detail: OBSERVATION
Discharge: NURSING, REHAB OR LONG-TERM CARE | End: 2024-10-01
Attending: INTERNAL MEDICINE | Admitting: INTERNAL MEDICINE
Payer: MEDICARE

## 2024-09-29 ENCOUNTER — APPOINTMENT (OUTPATIENT)
Dept: CT IMAGING | Age: 89
End: 2024-09-29
Payer: MEDICARE

## 2024-09-29 DIAGNOSIS — M79.18 MUSCULOSKELETAL PAIN: ICD-10-CM
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
## 2024-09-29 DIAGNOSIS — S09.90XA INJURY OF HEAD, INITIAL ENCOUNTER: Primary | ICD-10-CM

## 2024-09-29 DIAGNOSIS — R06.09 EXERTIONAL DYSPNEA: ICD-10-CM

## 2024-09-29 DIAGNOSIS — K80.20 CALCULUS OF GALLBLADDER WITHOUT CHOLECYSTITIS WITHOUT OBSTRUCTION: ICD-10-CM

## 2024-09-29 PROBLEM — R09.02 HYPOXIA: Status: ACTIVE | Noted: 2024-09-29

## 2024-09-29 LAB
ALBUMIN SERPL-MCNC: 4.3 G/DL (ref 3.5–4.6)
ALP SERPL-CCNC: 76 U/L (ref 40–130)
ALT SERPL-CCNC: 21 U/L (ref 0–33)
ANION GAP SERPL CALCULATED.3IONS-SCNC: 11 MEQ/L (ref 9–15)
APTT PPP: 30.8 SEC (ref 24.4–36.8)
AST SERPL-CCNC: 22 U/L (ref 0–35)
BASOPHILS # BLD: 0 K/UL (ref 0–0.2)
BASOPHILS NFR BLD: 0.4 %
BILIRUB SERPL-MCNC: 0.5 MG/DL (ref 0.2–0.7)
BUN SERPL-MCNC: 21 MG/DL (ref 8–23)
CALCIUM SERPL-MCNC: 9.4 MG/DL (ref 8.5–9.9)
CHLORIDE SERPL-SCNC: 102 MEQ/L (ref 95–107)
CO2 SERPL-SCNC: 29 MEQ/L (ref 20–31)
CREAT SERPL-MCNC: 1.26 MG/DL (ref 0.5–0.9)
EOSINOPHIL # BLD: 0.1 K/UL (ref 0–0.7)
EOSINOPHIL NFR BLD: 2.4 %
ERYTHROCYTE [DISTWIDTH] IN BLOOD BY AUTOMATED COUNT: 13.2 % (ref 11.5–14.5)
GLOBULIN SER CALC-MCNC: 3.2 G/DL (ref 2.3–3.5)
GLUCOSE SERPL-MCNC: 102 MG/DL (ref 70–99)
HCT VFR BLD AUTO: 42.1 % (ref 37–47)
HGB BLD-MCNC: 14.5 G/DL (ref 12–16)
INR PPP: 1.1
LYMPHOCYTES # BLD: 1.1 K/UL (ref 1–4.8)
LYMPHOCYTES NFR BLD: 19.7 %
MCH RBC QN AUTO: 33.6 PG (ref 27–31.3)
MCHC RBC AUTO-ENTMCNC: 34.4 % (ref 33–37)
MCV RBC AUTO: 97.7 FL (ref 79.4–94.8)
MONOCYTES # BLD: 0.4 K/UL (ref 0.2–0.8)
MONOCYTES NFR BLD: 7.7 %
NEUTROPHILS # BLD: 3.7 K/UL (ref 1.4–6.5)
NEUTS SEG NFR BLD: 69.4 %
PLATELET # BLD AUTO: 122 K/UL (ref 130–400)
POC CREATININE WHOLE BLOOD: 1.4
POTASSIUM SERPL-SCNC: 4.1 MEQ/L (ref 3.4–4.9)
PROT SERPL-MCNC: 7.5 G/DL (ref 6.3–8)
PROTHROMBIN TIME: 14 SEC (ref 12.3–14.9)
RBC # BLD AUTO: 4.31 M/UL (ref 4.2–5.4)
SODIUM SERPL-SCNC: 142 MEQ/L (ref 135–144)
WBC # BLD AUTO: 5.3 K/UL (ref 4.8–10.8)

## 2024-09-29 PROCEDURE — 74177 CT ABD & PELVIS W/CONTRAST: CPT

## 2024-09-29 PROCEDURE — 70450 CT HEAD/BRAIN W/O DYE: CPT

## 2024-09-29 PROCEDURE — 85025 COMPLETE CBC W/AUTO DIFF WBC: CPT

## 2024-09-29 PROCEDURE — 85730 THROMBOPLASTIN TIME PARTIAL: CPT

## 2024-09-29 PROCEDURE — 99285 EMERGENCY DEPT VISIT HI MDM: CPT

## 2024-09-29 PROCEDURE — G0378 HOSPITAL OBSERVATION PER HR: HCPCS

## 2024-09-29 PROCEDURE — 2580000003 HC RX 258: Performed by: INTERNAL MEDICINE

## 2024-09-29 PROCEDURE — 80053 COMPREHEN METABOLIC PANEL: CPT

## 2024-09-29 PROCEDURE — 97162 PT EVAL MOD COMPLEX 30 MIN: CPT

## 2024-09-29 PROCEDURE — 85610 PROTHROMBIN TIME: CPT

## 2024-09-29 PROCEDURE — 6360000004 HC RX CONTRAST MEDICATION: Performed by: PHYSICIAN ASSISTANT

## 2024-09-29 PROCEDURE — 96374 THER/PROPH/DIAG INJ IV PUSH: CPT

## 2024-09-29 PROCEDURE — 71260 CT THORAX DX C+: CPT

## 2024-09-29 PROCEDURE — 72125 CT NECK SPINE W/O DYE: CPT

## 2024-09-29 PROCEDURE — 6370000000 HC RX 637 (ALT 250 FOR IP): Performed by: INTERNAL MEDICINE

## 2024-09-29 PROCEDURE — 6360000002 HC RX W HCPCS: Performed by: INTERNAL MEDICINE

## 2024-09-29 RX ORDER — VITAMIN B COMPLEX
2000 TABLET ORAL
Status: DISCONTINUED | OUTPATIENT
Start: 2024-09-29 | End: 2024-10-01 | Stop reason: HOSPADM

## 2024-09-29 RX ORDER — ASPIRIN 81 MG/1
81 TABLET ORAL DAILY
Status: DISCONTINUED | OUTPATIENT
Start: 2024-09-29 | End: 2024-10-01 | Stop reason: HOSPADM

## 2024-09-29 RX ORDER — ONDANSETRON 2 MG/ML
4 INJECTION INTRAMUSCULAR; INTRAVENOUS EVERY 6 HOURS PRN
Status: DISCONTINUED | OUTPATIENT
Start: 2024-09-29 | End: 2024-10-01 | Stop reason: HOSPADM

## 2024-09-29 RX ORDER — FUROSEMIDE 40 MG
40 TABLET ORAL DAILY
Status: DISCONTINUED | OUTPATIENT
Start: 2024-09-30 | End: 2024-10-01 | Stop reason: HOSPADM

## 2024-09-29 RX ORDER — ONDANSETRON 4 MG/1
4 TABLET, ORALLY DISINTEGRATING ORAL EVERY 8 HOURS PRN
Status: DISCONTINUED | OUTPATIENT
Start: 2024-09-29 | End: 2024-10-01 | Stop reason: HOSPADM

## 2024-09-29 RX ORDER — CETIRIZINE HYDROCHLORIDE 10 MG/1
5 TABLET ORAL DAILY
Status: DISCONTINUED | OUTPATIENT
Start: 2024-09-29 | End: 2024-10-01 | Stop reason: HOSPADM

## 2024-09-29 RX ORDER — POLYETHYLENE GLYCOL 3350 17 G/17G
17 POWDER, FOR SOLUTION ORAL DAILY PRN
Status: DISCONTINUED | OUTPATIENT
Start: 2024-09-29 | End: 2024-10-01 | Stop reason: HOSPADM

## 2024-09-29 RX ORDER — SODIUM CHLORIDE 0.9 % (FLUSH) 0.9 %
5-40 SYRINGE (ML) INJECTION PRN
Status: DISCONTINUED | OUTPATIENT
Start: 2024-09-29 | End: 2024-10-01 | Stop reason: HOSPADM

## 2024-09-29 RX ORDER — METOPROLOL SUCCINATE 100 MG/1
100 TABLET, EXTENDED RELEASE ORAL DAILY
Status: DISCONTINUED | OUTPATIENT
Start: 2024-09-29 | End: 2024-10-01 | Stop reason: HOSPADM

## 2024-09-29 RX ORDER — BUPROPION HYDROCHLORIDE 150 MG/1
150 TABLET ORAL EVERY MORNING
Status: DISCONTINUED | OUTPATIENT
Start: 2024-09-29 | End: 2024-10-01 | Stop reason: HOSPADM

## 2024-09-29 RX ORDER — IOPAMIDOL 755 MG/ML
75 INJECTION, SOLUTION INTRAVASCULAR
Status: COMPLETED | OUTPATIENT
Start: 2024-09-29 | End: 2024-09-29

## 2024-09-29 RX ORDER — FUROSEMIDE 10 MG/ML
20 INJECTION INTRAMUSCULAR; INTRAVENOUS ONCE
Status: COMPLETED | OUTPATIENT
Start: 2024-09-29 | End: 2024-09-29

## 2024-09-29 RX ORDER — SODIUM CHLORIDE 0.9 % (FLUSH) 0.9 %
5-40 SYRINGE (ML) INJECTION EVERY 12 HOURS SCHEDULED
Status: DISCONTINUED | OUTPATIENT
Start: 2024-09-29 | End: 2024-10-01 | Stop reason: HOSPADM

## 2024-09-29 RX ORDER — CLONAZEPAM 0.5 MG/1
0.5 TABLET ORAL 2 TIMES DAILY
Status: DISCONTINUED | OUTPATIENT
Start: 2024-09-29 | End: 2024-10-01 | Stop reason: HOSPADM

## 2024-09-29 RX ORDER — SERTRALINE HYDROCHLORIDE 100 MG/1
200 TABLET, FILM COATED ORAL DAILY
Status: DISCONTINUED | OUTPATIENT
Start: 2024-09-29 | End: 2024-10-01 | Stop reason: HOSPADM

## 2024-09-29 RX ORDER — ACETAMINOPHEN 650 MG/1
650 SUPPOSITORY RECTAL EVERY 6 HOURS PRN
Status: DISCONTINUED | OUTPATIENT
Start: 2024-09-29 | End: 2024-10-01 | Stop reason: HOSPADM

## 2024-09-29 RX ORDER — SODIUM CHLORIDE 9 MG/ML
INJECTION, SOLUTION INTRAVENOUS PRN
Status: DISCONTINUED | OUTPATIENT
Start: 2024-09-29 | End: 2024-10-01 | Stop reason: HOSPADM

## 2024-09-29 RX ORDER — SPIRONOLACTONE 25 MG/1
25 TABLET ORAL DAILY
Status: DISCONTINUED | OUTPATIENT
Start: 2024-09-29 | End: 2024-10-01 | Stop reason: HOSPADM

## 2024-09-29 RX ORDER — ACETAMINOPHEN 325 MG/1
650 TABLET ORAL EVERY 6 HOURS PRN
Status: DISCONTINUED | OUTPATIENT
Start: 2024-09-29 | End: 2024-10-01 | Stop reason: HOSPADM

## 2024-09-29 RX ORDER — LOSARTAN POTASSIUM 25 MG/1
50 TABLET ORAL DAILY
Status: DISCONTINUED | OUTPATIENT
Start: 2024-09-29 | End: 2024-10-01 | Stop reason: HOSPADM

## 2024-09-29 RX ORDER — LEVOTHYROXINE SODIUM 25 UG/1
25 TABLET ORAL DAILY
Status: DISCONTINUED | OUTPATIENT
Start: 2024-09-29 | End: 2024-10-01 | Stop reason: HOSPADM

## 2024-09-29 RX ADMIN — BUPROPION HYDROCHLORIDE 150 MG: 150 TABLET, EXTENDED RELEASE ORAL at 12:32

## 2024-09-29 RX ADMIN — CLONAZEPAM 0.5 MG: 0.5 TABLET ORAL at 20:46

## 2024-09-29 RX ADMIN — SERTRALINE 200 MG: 100 TABLET, FILM COATED ORAL at 12:31

## 2024-09-29 RX ADMIN — LEVOTHYROXINE SODIUM 25 MCG: 0.03 TABLET ORAL at 12:32

## 2024-09-29 RX ADMIN — ACETAMINOPHEN 325MG 650 MG: 325 TABLET ORAL at 12:32

## 2024-09-29 RX ADMIN — LOSARTAN POTASSIUM 50 MG: 25 TABLET, FILM COATED ORAL at 12:31

## 2024-09-29 RX ADMIN — CETIRIZINE HYDROCHLORIDE 5 MG: 10 TABLET, FILM COATED ORAL at 12:32

## 2024-09-29 RX ADMIN — IOPAMIDOL 75 ML: 755 INJECTION, SOLUTION INTRAVENOUS at 07:07

## 2024-09-29 RX ADMIN — FUROSEMIDE 20 MG: 10 INJECTION, SOLUTION INTRAMUSCULAR; INTRAVENOUS at 11:25

## 2024-09-29 RX ADMIN — SPIRONOLACTONE 25 MG: 25 TABLET ORAL at 12:32

## 2024-09-29 RX ADMIN — SODIUM CHLORIDE, PRESERVATIVE FREE 10 ML: 5 INJECTION INTRAVENOUS at 20:46

## 2024-09-29 RX ADMIN — METOPROLOL SUCCINATE 100 MG: 100 TABLET, EXTENDED RELEASE ORAL at 12:31

## 2024-09-29 RX ADMIN — CLONAZEPAM 0.5 MG: 0.5 TABLET ORAL at 12:31

## 2024-09-29 RX ADMIN — SODIUM CHLORIDE, PRESERVATIVE FREE 10 ML: 5 INJECTION INTRAVENOUS at 11:26

## 2024-09-29 RX ADMIN — ASPIRIN 81 MG: 81 TABLET, COATED ORAL at 12:47

## 2024-09-29 RX ADMIN — APIXABAN 2.5 MG: 2.5 TABLET, FILM COATED ORAL at 12:32

## 2024-09-29 ASSESSMENT — LIFESTYLE VARIABLES
HOW MANY STANDARD DRINKS CONTAINING ALCOHOL DO YOU HAVE ON A TYPICAL DAY: PATIENT DOES NOT DRINK
HOW OFTEN DO YOU HAVE A DRINK CONTAINING ALCOHOL: NEVER
HOW MANY STANDARD DRINKS CONTAINING ALCOHOL DO YOU HAVE ON A TYPICAL DAY: PATIENT DOES NOT DRINK
HOW OFTEN DO YOU HAVE A DRINK CONTAINING ALCOHOL: NEVER

## 2024-09-29 ASSESSMENT — PAIN SCALES - GENERAL
PAINLEVEL_OUTOF10: 3
PAINLEVEL_OUTOF10: 3

## 2024-09-29 ASSESSMENT — PAIN DESCRIPTION - DESCRIPTORS: DESCRIPTORS: ACHING

## 2024-09-29 ASSESSMENT — PAIN DESCRIPTION - LOCATION: LOCATION: WRIST

## 2024-09-29 ASSESSMENT — PAIN DESCRIPTION - ORIENTATION: ORIENTATION: RIGHT

## 2024-09-29 NOTE — CARE COORDINATION
Case Management Assessment  Initial Evaluation    Date/Time of Evaluation: 9/29/2024 11:20 AM  Assessment Completed by: Fabby Jones, RN, BSN    If patient is discharged prior to next notation, then this note serves as note for discharge by case management.    Patient Name: Frances Meek                   YOB: 1931  Diagnosis: Exertional dyspnea [R06.09]  Hypoxia [R09.02]  Musculoskeletal pain [M79.18]  Calculus of gallbladder without cholecystitis without obstruction [K80.20]  Injury of head, initial encounter [S09.90XA]  Fall, initial encounter [W19.XXXA]                   Date / Time: 9/29/2024  5:55 AM    Patient Admission Status: Observation   Readmission Risk (Low < 19, Mod (19-27), High > 27): Readmission Risk Score: 18.4    Current PCP: Braydon Francis MD  PCP verified by CM? Yes    Chart Reviewed: Yes      History Provided by: Patient, Child/Family, Medical Record, Other (see comment) (dtr renetta who is poa)  Patient Orientation: Alert and Oriented, Person, Place, Situation    Patient Cognition: Alert    Hospitalization in the last 30 days (Readmission):  Yes    If yes, Readmission Assessment in  Navigator will be completed.      .Readmission Assessment  Number of Days since last admission?: 8-30 days  Previous Disposition: Other (comment) (COMPLETED REHAB RETURNED TO ASSISTED LIVING)  Who is being Interviewed: Patient, Caregiver  What was the patient's/caregiver's perception as to why they think they needed to return back to the hospital?: Other (Comment) (PT HAD FALL AT ASSISTED LIVING ANKLE SWELLING RETURNED)  Did you visit your Primary Care Physician after you left the hospital, before you returned this time?: No (LIVES IN ASSISTED LIVING SEEN HOUSE DR 8/27)  Why weren't you able to visit your PCP?: Other (Comment) (SEE ABOVE)  Did you see a specialist, such as Cardiac, Pulmonary, Orthopedic Physician, etc. after you left the hospital?: No  Who advised the patient to return to the

## 2024-09-29 NOTE — ED NOTES
The pt was walked while utilizing the pulse ox, she walked about 10' feet and her SP02 dropped from 90% RA to 84% RA.  The pt lives at [facility] and uses oxygen as needed.  After returning to the bed her SP02 returned to 91% RA within two minutes.  Pt was placed back on 02 4 LPM.

## 2024-09-29 NOTE — H&P
use: Never    Sexual activity: Not Currently   Other Topics Concern    Not on file   Social History Narrative    Lives With:  ([facility]Assisted living    Home Layout: One level- Level entry    Bathroom Shower/Tub: Shower chair without back, Toilet: Handicap height, Equipment: Grab bars in shower, Toilet raiser, Built-in shower seat, Shower chair, Commode    Bathroom Accessibility: Wheelchair accessible    Home Equipment: Walker - Standard, Rollator, Wheelchair - Manual    Receives Help From: Family    ADL Assistance: Independent    Bath: Supervision, Dressing: Independent    Grooming: Independent    Feeding: Independent    Toileting: Independent    Homemaking Assistance: Needs assistance (AL provided cleaning, laundry, and meals),Homemaking Responsibilities: No    Ambulation Assistance: Independent (rollator)    Transfer Assistance: Needs assistance    Active : No-FAMILY AND FACILITY- [facility]         Social Determinants of Health     Financial Resource Strain: Low Risk  (3/13/2024)    Received from [facility]    Overall Financial Resource Strain (CARDIA)     Difficulty of Paying Living Expenses: Not hard at all   Food Insecurity: No Food Insecurity (8/30/2024)    Hunger Vital Sign     Worried About Running Out of Food in the Last Year: Never true     Ran Out of Food in the Last Year: Never true   Transportation Needs: No Transportation Needs (8/30/2024)    PRAPARE - Transportation     Lack of Transportation (Medical): No     Lack of Transportation (Non-Medical): No   Physical Activity: Inactive (7/4/2021)    Received from [facility]    Exercise Vital Sign     Days of Exercise per Week: 0 days     Minutes of Exercise per Session: 0 min   Stress: Stress Concern Present (7/4/2021)    Received from [facility]    [employer] - Occupational Stress Questionnaire     Feeling of Stress : Rather much   Social

## 2024-09-29 NOTE — PROGRESS NOTES
Physical Therapy Med Surg Initial Assessment  Facility/Department: [address] ORTHO TELE  Room: [facility]/[place] [address]       NAME: Frances Meek  : 1931 (89 y.o.)  MRN: [id number]  CODE STATUS: Limited    Date of Service: 2024    Patient Diagnosis(es): Exertional dyspnea [R06.09]  Hypoxia [R09.02]  Musculoskeletal pain [M79.18]  Calculus of gallbladder without cholecystitis without obstruction [K80.20]  Injury of head, initial encounter [S09.90XA]  Fall, initial encounter [W19.XXXA]   Chief Complaint   Patient presents with    Fall     Pt fell at nursing home while using bathroom. Pt was not using walker at time of fall. Pt states she hit the back of  her head, denies losing conscious. Pt takes Eliquis. Pt denies pain. Pt is A/Ox4     Patient Active Problem List    Diagnosis Date Noted    Hypoxia 2024    Impaired mobility and ADLs dt CP debility 2024    Impaired mobility and activities of daily living 2024    Acute on chronic congestive heart failure (HCC) 2024    CHF (congestive heart failure), NYHA class I, acute on chronic, combined (HCC) 2024    Other iron deficiency anemias 2024    Aortic stenosis, moderate 2024    Class 1 obesity due to excess calories with serious comorbidity and body mass index (BMI) of 31.0 to 31.9 in adult 2024    Community acquired bacterial pneumonia 2024    Pulmonary hypertension (HCC) 2024    Valvular heart disease 2024    Acute diastolic (congestive) heart failure (HCC) 2024    Thrombocytopenia, unspecified (HCC) 10/30/2023    Chronic combined systolic and diastolic heart failure (HCC) 2021    Hypertensive heart disease with heart failure (HCC) 2021    Long term (current) use of anticoagulants 2021    CKD (chronic kidney disease) stage 3, GFR 30-59 ml/min (HCC) 2020    Obesity, Class I, BMI 30-34.9 2019    Paroxysmal atrial fibrillation (HCC) 2019    Psychophysiological insomnia

## 2024-09-29 NOTE — ED PROVIDER NOTES
[facility] ORTHO TELE  EMERGENCY DEPARTMENT ENCOUNTER      Pt Name: Frances Meek  MRN: [id number]  Birthdate 5/28/1931  Date of evaluation: 9/29/2024  Provider: Chun Oliveros PA-C  6:26 AM EDT    CHIEF COMPLAINT       Chief Complaint   Patient presents with    Fall     Pt fell at nursing home while using bathroom. Pt was not using walker at time of fall. Pt states she hit the back of  her head, denies losing conscious. Pt takes Eliquis. Pt denies pain. Pt is A/Ox4         HISTORY OF PRESENT ILLNESS   (Location/Symptom, Timing/Onset, Context/Setting, Quality, Duration, Modifying Factors, Severity)  Note limiting factors.   Frances Meek is a 89 y.o. female who presents to the emergency department patient states she fell in the bathroom at the nursing home she states she did hit her head did have a headache, does take Eliquis she initially had neck pain.  She does states she has left lower back pain.  Patient denies loss of consciousness blurred vision difficulty speaking pain with urination, frequent urination, bleeding with urination, rectal bleeding, chest pain, abdominal pain, leg pain, arm pain.  Symptoms moderate severity nothing improves or worsens her symptoms    HPI    Nursing Notes were reviewed.    REVIEW OF SYSTEMS    (2-9 systems for level 4, 10 or more for level 5)     Review of Systems   Constitutional:  Negative for activity change, appetite change and fever.   HENT:  Negative for ear discharge, nosebleeds and voice change.    Eyes:  Negative for discharge.   Respiratory:  Negative for shortness of breath.    Cardiovascular:  Positive for leg swelling. Negative for chest pain.   Gastrointestinal:  Negative for abdominal distention, abdominal pain, anal bleeding, nausea and vomiting.   Musculoskeletal:  Positive for back pain and neck pain. Negative for joint swelling and neck stiffness.   Skin:  Negative for pallor.   Neurological:  Positive for headaches. Negative for seizures and facial

## 2024-09-29 NOTE — CARE COORDINATION
Met with pt and daughter Natty at bedside to discuss discharge planning. New referral in for hospice services. [place] of choice given and pt and daughter would like [facility].   Referral called to Sathish with [facility] at [phone].

## 2024-09-29 NOTE — CARE COORDINATION
Met with pt & dtr, completed [facility] 9/9. Had meds adjusted for swelling but now swelling has returned & dtr fells may have attributed to fall.    Pt inquired about hospice. Options and FOC provided w list & philosophy of hospice. Pt states \"I'm 89 and I just want to be comfortable\".Dtr very receptive and she will provide choice (leaning toward [facility] or [facility], she will give choice after speaking with family. Dtr & pt agreeable to have hospice meeting during this hospitalization or when she returns to [facility].       Dr. Yang arrived and informed and discussed code status & placed limited code. He also will provide hospice order.     Electronically signed by Fabby Jones, RN, BSN on 9/29/2024 at 11:16 AM

## 2024-09-29 NOTE — DISCHARGE INSTRUCTIONS
Follow up with primary care physician in the next 7 days or sooner if needed. If you do not have a Primary care physician, please schedule an appointment with one. Please ask prior to discharge about a list of local providers.     Please return to ER or call 911 if you develop any significant signs or symptoms.    I may not have addressed all of your medical illnesses or the abnormal blood work or imaging therefore please ask your PCP to obtain [facility] record to follow up on all of the abnormal labs, imaging and findings that I have and have not addressed during your hospitalization.     Discharging you from the hospital does not mean that your medical care ends here and now. You may still need additional work up, investigation, monitoring, and treatment to be handled from this point on by outside providers including your PCP, Specialists and other healthcare providers.     For medication questions, contact your retail pharmacy and your PCP.    Your medical team at [facility] appreciates the opportunity to work with you to get well!    Duy Yang,   8:37 AM

## 2024-09-29 NOTE — ACP (ADVANCE CARE PLANNING)
Advance Care Planning   Healthcare Decision Maker:    Primary Decision Maker: Natty Jaime - Child - [phone]    Secondary Decision Maker: SANJIV LYON - Child - [phone]    Click here to complete Healthcare Decision Makers including selection of the Healthcare Decision Maker Relationship (ie \"Primary\").  Today we documented Decision Maker(s) consistent with ACP documents on file.       Electronically signed by Fabby Jones RN, BSN on 9/29/2024 at 11:20 AM

## 2024-09-30 LAB
ALBUMIN SERPL-MCNC: 3.9 G/DL (ref 3.5–4.6)
ALP SERPL-CCNC: 66 U/L (ref 40–130)
ALT SERPL-CCNC: 17 U/L (ref 0–33)
ANION GAP SERPL CALCULATED.3IONS-SCNC: 7 MEQ/L (ref 9–15)
AST SERPL-CCNC: 17 U/L (ref 0–35)
BASOPHILS # BLD: 0 K/UL (ref 0–0.2)
BASOPHILS NFR BLD: 0.4 %
BILIRUB SERPL-MCNC: 0.5 MG/DL (ref 0.2–0.7)
BUN SERPL-MCNC: 18 MG/DL (ref 8–23)
CALCIUM SERPL-MCNC: 9 MG/DL (ref 8.5–9.9)
CHLORIDE SERPL-SCNC: 100 MEQ/L (ref 95–107)
CO2 SERPL-SCNC: 32 MEQ/L (ref 20–31)
CREAT SERPL-MCNC: 1.29 MG/DL (ref 0.5–0.9)
EOSINOPHIL # BLD: 0.1 K/UL (ref 0–0.7)
EOSINOPHIL NFR BLD: 1.6 %
ERYTHROCYTE [DISTWIDTH] IN BLOOD BY AUTOMATED COUNT: 13.2 % (ref 11.5–14.5)
GLOBULIN SER CALC-MCNC: 2.5 G/DL (ref 2.3–3.5)
GLUCOSE SERPL-MCNC: 128 MG/DL (ref 70–99)
HCT VFR BLD AUTO: 36.6 % (ref 37–47)
HGB BLD-MCNC: 13.1 G/DL (ref 12–16)
LYMPHOCYTES # BLD: 0.9 K/UL (ref 1–4.8)
LYMPHOCYTES NFR BLD: 16.7 %
MCH RBC QN AUTO: 34.7 PG (ref 27–31.3)
MCHC RBC AUTO-ENTMCNC: 35.8 % (ref 33–37)
MCV RBC AUTO: 96.8 FL (ref 79.4–94.8)
MONOCYTES # BLD: 0.5 K/UL (ref 0.2–0.8)
MONOCYTES NFR BLD: 8.2 %
NEUTROPHILS # BLD: 4.1 K/UL (ref 1.4–6.5)
NEUTS SEG NFR BLD: 72.7 %
PERFORMED ON: ABNORMAL
PLATELET # BLD AUTO: 106 K/UL (ref 130–400)
POC CREATININE: 1.5 MG/DL (ref 0.6–1.2)
POC SAMPLE TYPE: ABNORMAL
POTASSIUM SERPL-SCNC: 4 MEQ/L (ref 3.4–4.9)
PROT SERPL-MCNC: 6.4 G/DL (ref 6.3–8)
RBC # BLD AUTO: 3.78 M/UL (ref 4.2–5.4)
SODIUM SERPL-SCNC: 139 MEQ/L (ref 135–144)
WBC # BLD AUTO: 5.6 K/UL (ref 4.8–10.8)

## 2024-09-30 PROCEDURE — 2580000003 HC RX 258: Performed by: INTERNAL MEDICINE

## 2024-09-30 PROCEDURE — 85025 COMPLETE CBC W/AUTO DIFF WBC: CPT

## 2024-09-30 PROCEDURE — 6370000000 HC RX 637 (ALT 250 FOR IP): Performed by: INTERNAL MEDICINE

## 2024-09-30 PROCEDURE — G0378 HOSPITAL OBSERVATION PER HR: HCPCS

## 2024-09-30 PROCEDURE — 36415 COLL VENOUS BLD VENIPUNCTURE: CPT

## 2024-09-30 PROCEDURE — 80053 COMPREHEN METABOLIC PANEL: CPT

## 2024-09-30 RX ADMIN — SODIUM CHLORIDE, PRESERVATIVE FREE 10 ML: 5 INJECTION INTRAVENOUS at 08:35

## 2024-09-30 RX ADMIN — CLONAZEPAM 0.5 MG: 0.5 TABLET ORAL at 08:35

## 2024-09-30 RX ADMIN — LOSARTAN POTASSIUM 50 MG: 25 TABLET, FILM COATED ORAL at 08:35

## 2024-09-30 RX ADMIN — APIXABAN 2.5 MG: 2.5 TABLET, FILM COATED ORAL at 08:35

## 2024-09-30 RX ADMIN — SODIUM CHLORIDE, PRESERVATIVE FREE 10 ML: 5 INJECTION INTRAVENOUS at 21:32

## 2024-09-30 RX ADMIN — ASPIRIN 81 MG: 81 TABLET, COATED ORAL at 08:32

## 2024-09-30 RX ADMIN — BUPROPION HYDROCHLORIDE 150 MG: 150 TABLET, EXTENDED RELEASE ORAL at 08:35

## 2024-09-30 RX ADMIN — CETIRIZINE HYDROCHLORIDE 5 MG: 10 TABLET, FILM COATED ORAL at 08:35

## 2024-09-30 RX ADMIN — LEVOTHYROXINE SODIUM 25 MCG: 0.03 TABLET ORAL at 06:10

## 2024-09-30 RX ADMIN — SERTRALINE 200 MG: 100 TABLET, FILM COATED ORAL at 08:32

## 2024-09-30 RX ADMIN — METOPROLOL SUCCINATE 100 MG: 100 TABLET, EXTENDED RELEASE ORAL at 08:32

## 2024-09-30 RX ADMIN — Medication 2000 UNITS: at 17:35

## 2024-09-30 RX ADMIN — FUROSEMIDE 40 MG: 40 TABLET ORAL at 08:32

## 2024-09-30 RX ADMIN — SPIRONOLACTONE 25 MG: 25 TABLET ORAL at 08:35

## 2024-09-30 RX ADMIN — CLONAZEPAM 0.5 MG: 0.5 TABLET ORAL at 21:21

## 2024-09-30 NOTE — PROGRESS NOTES
Spiritual Health History and Assessment/Progress Note  [facility]    Attempted Encounter (Patient is tired and confused. Spoke to the nurse regarding her confusion.),  ,  ,      Name: Frances Meek MRN: [id number]    Age: 89 y.o.     Sex: female   Language: English   Restorationism: Denominational   Hypoxia     Date: 9/30/2024                           Spiritual Assessment began in [facility] ORTHO TELE            Encounter Overview/Reason: Attempted Encounter (Patient is tired and confused. Spoke to the nurse regarding her confusion.)       [place], Belief, Meaning:   Patient unable to assess at this time  Family/Friends No family/friends present      Importance and Influence:  Patient unable to assess at this time  Family/Friends No family/friends present    Community:  Patient Other: unable to assess  Family/Friends No family/friends present    Assessment and Plan of Care:     Patient Interventions include: Other: patient tired and confused  Family/Friends Interventions include: No family/friends present    Patient Plan of Care: Other: none  Family/Friends Plan of Care: No family/friends present    Electronically signed by Sri Aviles  Intern on 9/30/2024 at 4:38 PM

## 2024-09-30 NOTE — DISCHARGE SUMMARY
performed without the administration of intravenous contrast. Automated exposure control, iterative reconstruction, and/or weight based adjustment of the mA/kV was utilized to reduce the radiation dose to as low as reasonably achievable. COMPARISON: None. HISTORY: ORDERING SYSTEM PROVIDED HISTORY: fall with head injury on anti coag TECHNOLOGIST PROVIDED HISTORY: Reason for exam:->fall with head injury on anti coag Has a \"code stroke\" or \"stroke alert\" been called?->No Decision Support Exception - unselect if not a suspected or confirmed emergency medical condition->Emergency Medical Condition (MA) What reading provider will be dictating this exam?->CRC FINDINGS: BRAIN/VENTRICLES: Mild generalized atrophy identified of the brain.  Minimal low-attenuation areas seen within the periventricular and subcortical white matter to suggest chronic small vessel ischemic change.  There is no acute intracranial hemorrhage, mass effect or midline shift.  No abnormal extra-axial fluid collection.  The gray-white differentiation is maintained without evidence of an acute infarct.  There is no evidence of hydrocephalus. ORBITS: The visualized portion of the orbits demonstrate no acute abnormality. SINUSES: The visualized paranasal sinuses and mastoid air cells demonstrate no acute abnormality. SOFT TISSUES/SKULL:  No acute abnormality of the visualized skull or soft tissues.     Mild atrophy and chronic changes seen within the brain with no acute intracranial abnormality.       Discharge Medications:         Medication List        CONTINUE taking these medications      acetaminophen 500 MG tablet  Commonly known as: TYLENOL     apixaban 2.5 MG Tabs tablet  Commonly known as: ELIQUIS  Take 1 tablet by mouth daily     aspirin 81 MG EC tablet  Take 1 tablet by mouth daily     buPROPion 150 MG extended release tablet  Commonly known as: WELLBUTRIN XL     clonazePAM 0.5 MG tablet  Commonly known as: KLONOPIN     furosemide 40 MG

## 2024-09-30 NOTE — CARE COORDINATION
Definition of CHF discussed with patient. Patient states she used to watch her sodium intake at home, but since she has been at [facility] she feels their food there is very salty. She has mentioned it to the staff there but she doesn't think she can do anything about it. She also states they weigh her sometimes but not daily. Encouraged patient to speak up to nurses at [facility] to provide her with lower sodium options.   Symptoms of heart failure and decompensation reviewed: weight gain of >3 #, edema, difficulty breathing, cough, issues with appetite, fatigue, or difficulty with sleep.  Common causes of CHF reviewed: CAD, arrhythmias, MI, HTN, valve dz., infection, ETOH or drug abuse, or genetic abnormalities.  Importance of daily weight and B/P monitoring discussed. Pt to use a calender or notebook to record daily weight and call physician immediately with 3 # weight gain.  Low sodium diet and fluid intake discussed. Pt taught about a fluid restriction and advised to discuss this with the cardiologist prior to limiting oral intake.  Shown how to read labels for sodium levels, recommended food list provided.  I emphasized the importance of following their physician's orders for medication administration.  Importance of flu and pneumonia vaccinations reinforced.  Common CHF medications reviewed as well as avoiding certain other meds (decongestants, NSAIDS)  Instructed to discuss activity recommendations with physician.  Pt. Denies smoking.  Smoking cessation discussed with patient. 1-800-quit-now number provided.  Sample CHF weight documentation form provided.  CHF Zones discussed. Importance of staying in \"green\" area stressed. Pt verbalized understanding to call MD ASAP when she reaches the yellow zone, and to call 911 when reaching the red zone.  Booklet and zone pamphlet provided to the pt.  Patient denies any further questions at this time.

## 2024-09-30 NOTE — CARE COORDINATION
SPOKE WITH TIKA OF [facility]. PT WOULD LIKE TO RETURN TO [facility] WITH [facility]. PT HAD HOME O2 EVAL, QUALIFIES FOR 2L NC. TIKA IS ARRANGING O2 FOR [facility], WILL BE READY FOR DC TOMORROW.

## 2024-09-30 NOTE — CARE COORDINATION
SPOKE WITH TIKA AT [facility]. WILL BE HERE TODAY BETWEEN  TO MEET WITH PT.   PT AND FAMILY ARE AWARE OF MEETING TIME. WILL BE AT BEDSIDE.

## 2024-09-30 NOTE — PROGRESS NOTES
Internal Medicine   Hospitalist   Progress Note    2024   8:35 AM    Name:  Frances Meek  MRN:    [id number]     IP Day: 0     Admit Date: 2024  5:55 AM  PCP: Braydon Francis MD    Code Status:  Limited    Assessment and Plan:        Active Problems/ diagnosis:       Hypoxia due to mild acute on chronic diastolic CHF exacerbation  Fall-mechanical  A-fib on Eliquis  Gait instability-normally does well on her own at an assisted living facility  Hypertension  Mild to moderate aortic stenosis  Moderate to severe pulmonary hypertension     Plan  Will do home oxygen evaluation today, patient is also meeting with hospice today per patient and her daughter's request.  Monitor oxygen level, keep saturation above 92%  Resume p.o. Lasix  Monitor electrolytes and renal function  Resume home Eliquis  Reviewed imaging and labs  Home meds resumed as appropriate  Discussed CODE STATUS with patient and her daughter, she is limited code, no intubation or CPR or shocks or resuscitative medications.  Hospice informational consult placed  Regular diet       7 pm- 7 am, please contact on call Hospitalist for any needs     Subjective:     Denies dyspnea today.  Her oxygen saturation was 92% on room air.    Physical Examination:      Vitals:  BP (!) 160/79   Pulse 77   Temp 98.1 °F (36.7 °C) (Oral)   Resp 18   Ht 1.575 m (5' 2\")   Wt 72.6 kg (160 lb)   LMP  (LMP Unknown)   SpO2 98%   BMI 29.26 kg/m²   Temp (24hrs), Av °F (36.7 °C), Min:97.6 °F (36.4 °C), Max:98.5 °F (36.9 °C)      General appearance: alert, cooperative and no distress  Mental Status: oriented to person, place and time and normal affect  Lungs: clear to auscultation bilaterally, normal effort  Heart: regular rate   Abdomen: soft, nontender, nondistended, bowel sounds present, no masses  Extremities: Trace bilateral lower extremity edema, otherwise no redness, tenderness in the calves  Skin: no gross lesions, rashes    Data:     Labs:  Recent Labs

## 2024-09-30 NOTE — PROGRESS NOTES
[facility] Department  Change in Status Communication Form      To the referring physician:    No OT evaluation performed as pt plans to d/c with hospice to a facility. Please reorder if pt's status changes.    We thank you for your referral.     Regards,   [facility] OT Department.     Electronically signed by Rabia Weiss OTR/L on 9/30/24 at 2:12 PM EDT

## 2024-09-30 NOTE — PROGRESS NOTES
09/30/24 0853   Resting (Room Air)   SpO2 88   HR 82   Resting (On O2)   SpO2 92   HR 83   O2 Device Nasal cannula   O2 Flow Rate (l/min) 2 l/min   After Walk   Does the Patient Qualify for Home O2 Yes   Liter Flow at Rest 2   Liter Flow on Exertion 2   Does the Patient Need Portable Oxygen Tanks Yes

## 2024-10-01 VITALS
BODY MASS INDEX: 29.44 KG/M2 | SYSTOLIC BLOOD PRESSURE: 166 MMHG | OXYGEN SATURATION: 92 % | TEMPERATURE: 98.2 F | WEIGHT: 160 LBS | DIASTOLIC BLOOD PRESSURE: 92 MMHG | HEIGHT: 62 IN | HEART RATE: 81 BPM | RESPIRATION RATE: 18 BRPM

## 2024-10-01 LAB
ALBUMIN SERPL-MCNC: 3.8 G/DL (ref 3.5–4.6)
ALP SERPL-CCNC: 67 U/L (ref 40–130)
ALT SERPL-CCNC: 16 U/L (ref 0–33)
ANION GAP SERPL CALCULATED.3IONS-SCNC: 10 MEQ/L (ref 9–15)
AST SERPL-CCNC: 18 U/L (ref 0–35)
BILIRUB SERPL-MCNC: 0.4 MG/DL (ref 0.2–0.7)
BUN SERPL-MCNC: 26 MG/DL (ref 8–23)
CALCIUM SERPL-MCNC: 9.2 MG/DL (ref 8.5–9.9)
CHLORIDE SERPL-SCNC: 104 MEQ/L (ref 95–107)
CO2 SERPL-SCNC: 29 MEQ/L (ref 20–31)
CREAT SERPL-MCNC: 1.31 MG/DL (ref 0.5–0.9)
GLOBULIN SER CALC-MCNC: 3 G/DL (ref 2.3–3.5)
GLUCOSE SERPL-MCNC: 100 MG/DL (ref 70–99)
POTASSIUM SERPL-SCNC: 3.9 MEQ/L (ref 3.4–4.9)
PROT SERPL-MCNC: 6.8 G/DL (ref 6.3–8)
SODIUM SERPL-SCNC: 143 MEQ/L (ref 135–144)

## 2024-10-01 PROCEDURE — G0378 HOSPITAL OBSERVATION PER HR: HCPCS

## 2024-10-01 PROCEDURE — 80053 COMPREHEN METABOLIC PANEL: CPT

## 2024-10-01 PROCEDURE — 6370000000 HC RX 637 (ALT 250 FOR IP): Performed by: INTERNAL MEDICINE

## 2024-10-01 PROCEDURE — 2580000003 HC RX 258: Performed by: INTERNAL MEDICINE

## 2024-10-01 PROCEDURE — 36415 COLL VENOUS BLD VENIPUNCTURE: CPT

## 2024-10-01 RX ADMIN — LOSARTAN POTASSIUM 50 MG: 25 TABLET, FILM COATED ORAL at 09:10

## 2024-10-01 RX ADMIN — ASPIRIN 81 MG: 81 TABLET, COATED ORAL at 09:10

## 2024-10-01 RX ADMIN — FUROSEMIDE 40 MG: 40 TABLET ORAL at 09:10

## 2024-10-01 RX ADMIN — CETIRIZINE HYDROCHLORIDE 5 MG: 10 TABLET, FILM COATED ORAL at 09:11

## 2024-10-01 RX ADMIN — ACETAMINOPHEN 325MG 650 MG: 325 TABLET ORAL at 06:46

## 2024-10-01 RX ADMIN — SPIRONOLACTONE 25 MG: 25 TABLET ORAL at 09:10

## 2024-10-01 RX ADMIN — METOPROLOL SUCCINATE 100 MG: 100 TABLET, EXTENDED RELEASE ORAL at 09:10

## 2024-10-01 RX ADMIN — CLONAZEPAM 0.5 MG: 0.5 TABLET ORAL at 09:11

## 2024-10-01 RX ADMIN — SODIUM CHLORIDE, PRESERVATIVE FREE 10 ML: 5 INJECTION INTRAVENOUS at 09:12

## 2024-10-01 RX ADMIN — LEVOTHYROXINE SODIUM 25 MCG: 0.03 TABLET ORAL at 06:27

## 2024-10-01 RX ADMIN — BUPROPION HYDROCHLORIDE 150 MG: 150 TABLET, EXTENDED RELEASE ORAL at 09:10

## 2024-10-01 RX ADMIN — APIXABAN 2.5 MG: 2.5 TABLET, FILM COATED ORAL at 09:10

## 2024-10-01 RX ADMIN — SERTRALINE 200 MG: 100 TABLET, FILM COATED ORAL at 09:10

## 2024-10-01 ASSESSMENT — PAIN DESCRIPTION - DESCRIPTORS: DESCRIPTORS: ACHING

## 2024-10-01 ASSESSMENT — PAIN SCALES - GENERAL: PAINLEVEL_OUTOF10: 2

## 2024-10-01 ASSESSMENT — PAIN DESCRIPTION - ORIENTATION: ORIENTATION: RIGHT

## 2024-10-01 ASSESSMENT — PAIN DESCRIPTION - LOCATION: LOCATION: HIP

## 2024-10-01 NOTE — DISCHARGE INSTR - COC
Continuity of Care Form    Patient Name: Frances Meek   :  1931  MRN:  [id number]    Admit date:  2024  Discharge date:  10/1/24    Code Status Order: Limited   Advance Directives:   Advance Care Flowsheet Documentation             Admitting Physician:  Duy Yang DO  PCP: Braydon Francis MD    Discharging Nurse: ELLIOTT ACOSTA  Discharging Hospital Unit/Room#: W283/W283-01  Discharging Unit Phone Number: [phone]    Emergency Contact:   Extended Emergency Contact Information  Primary Emergency Contact: Natty Jaime  Home Phone: [phone]  Relation: Child  Secondary Emergency Contact: SANJIV LYON  Mobile Phone: [phone]  Relation: Child    Past Surgical History:  No past surgical history on file.    Immunization History:   Immunization History   Administered Date(s) Administered    Pneumococcal, PCV-13, PREVNAR 13, (age 6w+), IM, 0.5mL 2015    Pneumococcal, PPSV23, PNEUMOVAX 23, (age 2y+), SC/IM, 0.5mL 10/12/2016    TDaP, ADACEL (age 10y-64y), BOOSTRIX (age 10y+), IM, 0.5mL 2015, 07/10/2023       Active Problems:  Patient Active Problem List   Diagnosis Code    Choroidal nevus of right eye D31.31    CKD (chronic kidney disease) stage 3, GFR 30-59 ml/min ([facility]) N18.30    Diverticulosis K57.90    Esophageal reflux K21.9    Essential hypertension I10    AMANDA (generalized anxiety disorder) F41.1    HLD (hyperlipidemia) E78.5    Mild episode of recurrent major depressive disorder (HCC) F33.0    Nonintractable headache R51.9    Obesity, Class I, BMI 30-34.9 E66.811    Primary osteoarthritis of right knee M17.11    Paroxysmal atrial fibrillation (HCC) I48.0    Psychophysiological insomnia F51.04    Vitamin D deficiency E55.9    Chronic combined systolic and diastolic heart failure (HCC) I50.42    Hypertensive heart disease with heart failure (HCC) I11.0    Long term (current) use of anticoagulants Z79.01    Thrombocytopenia, unspecified (HCC) D69.6    Acute diastolic (congestive) heart

## 2024-10-01 NOTE — CARE COORDINATION
LSW SPOKE WITH [facility] AND THEY CONFIRMED THAT THE OXYGEN AND EQUIPMENT HAVE BEEN DELIVERED TO THE PT'S HOME AT [facility].  PT WILL RETURN TO [facility] TODAY [facility].  LSW CALLED TO UPDATE NICOLA AT [facility] AND MESSAGE WAS LEFT REGARDING THE PT'S RETURN TODAY.

## 2024-10-03 DIAGNOSIS — F41.9 ANXIETY: ICD-10-CM

## 2024-10-03 RX ORDER — CLONAZEPAM 0.5 MG/1
0.5 TABLET ORAL SEE ADMIN INSTRUCTIONS
Qty: 90 TABLET | Refills: 0 | Status: SHIPPED | OUTPATIENT
Start: 2024-10-03 | End: 2024-11-02

## 2024-10-03 ASSESSMENT — ENCOUNTER SYMPTOMS
SHORTNESS OF BREATH: 0
WHEEZING: 0
COUGH: 0

## 2024-10-03 NOTE — PROGRESS NOTES
Subjective:      Patient ID: Frances Meek is a pleasant 89 y.o. female who presents today for:  Chief Complaint   Patient presents with    Other     Hypertensive heart disease with heart failure (hcc)  (primary encounter diagnosis)  Dysthymic disorder  Amanda (generalized anxiety disorder)         [facility]    Patient seen today after follow-up from recent admit to hospital for acute fluid overload/CHF.  Did have her meds adjusted and increased losartan 50 mg daily.  And decreasing apixaban to 2.5 mg p.o. twice daily.  She states that she feels \"wiped out \", however her daughter does state that she looks much better and seems to be feeling much better.  Patient continues to have dysthymic overall attitude and depressed state.  She does see psychologist and counselor at least monthly. Does see psych for med adjustments.  On sertraline 100 mg p.o. twice daily as well as clonazepam 0.5 mg p.o. 3 times daily and bupropion 150 mg p.o. daily.  No plan on making med changes today however will monitor and continue to have her follow-up with psych as needed.    Patient Active Problem List   Diagnosis    Choroidal nevus of right eye    CKD (chronic kidney disease) stage 3, GFR 30-59 ml/min (HCC)    Diverticulosis    Esophageal reflux    Essential hypertension    AMANDA (generalized anxiety disorder)    HLD (hyperlipidemia)    Mild episode of recurrent major depressive disorder (HCC)    Nonintractable headache    Obesity, Class I, BMI 30-34.9    Primary osteoarthritis of right knee    Paroxysmal atrial fibrillation (HCC)    Psychophysiological insomnia    Vitamin D deficiency    Chronic combined systolic and diastolic heart failure (HCC)    Hypertensive heart disease with heart failure (HCC)    Long term (current) use of anticoagulants    Thrombocytopenia, unspecified (HCC)    Acute diastolic (congestive) heart failure (HCC)    Valvular heart disease    CHF (congestive heart failure), NYHA class I, acute on

## 2024-10-06 DIAGNOSIS — G89.29 OTHER CHRONIC PAIN: Primary | ICD-10-CM

## 2024-10-06 RX ORDER — TRAMADOL HYDROCHLORIDE 50 MG/1
50 TABLET ORAL EVERY 6 HOURS PRN
Qty: 28 TABLET | Refills: 0 | Status: SHIPPED | OUTPATIENT
Start: 2024-10-06 | End: 2024-10-13

## 2024-10-10 DIAGNOSIS — G89.29 OTHER CHRONIC PAIN: ICD-10-CM

## 2024-10-10 RX ORDER — TRAMADOL HYDROCHLORIDE 50 MG/1
50 TABLET ORAL EVERY 6 HOURS PRN
Qty: 28 TABLET | Refills: 0 | Status: SHIPPED | OUTPATIENT
Start: 2024-10-10 | End: 2024-10-17

## 2024-10-10 NOTE — PROGRESS NOTES
Subjective:      Patient ID: Frances Meek is a pleasant 89 y.o. female who presents today for:  Chief Complaint   Patient presents with    Other     Recurrent major depressive disorder, remission status unspecified (hcc)  (primary encounter diagnosis)  Encounter for hospice care discussion         [facility]    Today for discussion about possible hospice care parameters and conditions for admission to hospice.  Did have lengthy discussion with her and daughter who is present.  Patient states that she wants hospice and does not want any advanced care or further treatment of current chronic medical issues.  Patient has been in and out of the hospital in the past year periodically for CHF exacerbation.  She currently is clinically stable, vital signs have been stable and she is eating and drinking well.  She does not appear at this time to be acutely ill or showing evidence of acute decline.  However dyspneic case that she does not want further advanced treatments, will encourage her to maintain DNR CC status and if any further decline will revisit hospice care discussion at that point.  No acute concerns noted today.  Did encourage patient to seek out assistance from her psychologist/counselor who she sees periodically.      Patient Active Problem List   Diagnosis    Choroidal nevus of right eye    CKD (chronic kidney disease) stage 3, GFR 30-59 ml/min (HCC)    Diverticulosis    Esophageal reflux    Essential hypertension    AMANDA (generalized anxiety disorder)    HLD (hyperlipidemia)    Mild episode of recurrent major depressive disorder (HCC)    Nonintractable headache    Obesity, Class I, BMI 30-34.9    Primary osteoarthritis of right knee    Paroxysmal atrial fibrillation (HCC)    Psychophysiological insomnia    Vitamin D deficiency    Chronic combined systolic and diastolic heart failure (HCC)    Hypertensive heart disease with heart failure (HCC)    Long term (current) use of anticoagulants

## 2024-10-15 ENCOUNTER — OFFICE VISIT (OUTPATIENT)
Dept: GERIATRIC MEDICINE | Age: 89
End: 2024-10-15
Payer: MEDICARE

## 2024-10-15 DIAGNOSIS — N30.00 ACUTE CYSTITIS WITHOUT HEMATURIA: ICD-10-CM

## 2024-10-15 DIAGNOSIS — Z51.5 HOSPICE CARE PATIENT: Primary | ICD-10-CM

## 2024-10-15 PROCEDURE — 1036F TOBACCO NON-USER: CPT | Performed by: PHYSICIAN ASSISTANT

## 2024-10-15 PROCEDURE — 99348 HOME/RES VST EST LOW MDM 30: CPT | Performed by: PHYSICIAN ASSISTANT

## 2024-10-15 PROCEDURE — G8417 CALC BMI ABV UP PARAM F/U: HCPCS | Performed by: PHYSICIAN ASSISTANT

## 2024-10-15 PROCEDURE — 1123F ACP DISCUSS/DSCN MKR DOCD: CPT | Performed by: PHYSICIAN ASSISTANT

## 2024-10-15 PROCEDURE — G8484 FLU IMMUNIZE NO ADMIN: HCPCS | Performed by: PHYSICIAN ASSISTANT

## 2024-10-20 ENCOUNTER — HOSPITAL ENCOUNTER (OUTPATIENT)
Facility: HOSPITAL | Age: 89
Setting detail: OBSERVATION
Discharge: HOME | End: 2024-10-20
Attending: STUDENT IN AN ORGANIZED HEALTH CARE EDUCATION/TRAINING PROGRAM | Admitting: INTERNAL MEDICINE
Payer: MEDICARE

## 2024-10-20 ENCOUNTER — APPOINTMENT (OUTPATIENT)
Dept: RADIOLOGY | Facility: HOSPITAL | Age: 89
End: 2024-10-20
Payer: MEDICARE

## 2024-10-20 VITALS
SYSTOLIC BLOOD PRESSURE: 117 MMHG | TEMPERATURE: 96.4 F | HEIGHT: 62 IN | HEART RATE: 75 BPM | BODY MASS INDEX: 31.28 KG/M2 | OXYGEN SATURATION: 98 % | WEIGHT: 170 LBS | DIASTOLIC BLOOD PRESSURE: 61 MMHG | RESPIRATION RATE: 16 BRPM

## 2024-10-20 DIAGNOSIS — R79.89 ELEVATED TROPONIN: ICD-10-CM
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
## 2024-10-20 LAB
ALBUMIN SERPL BCP-MCNC: 4 G/DL (ref 3.4–5)
ALP SERPL-CCNC: 65 U/L (ref 33–136)
ALT SERPL W P-5'-P-CCNC: 21 U/L (ref 7–45)
ANION GAP SERPL CALC-SCNC: 12 MMOL/L (ref 10–20)
AST SERPL W P-5'-P-CCNC: 22 U/L (ref 9–39)
BASOPHILS # BLD AUTO: 0.03 X10*3/UL (ref 0–0.1)
BASOPHILS NFR BLD AUTO: 0.3 %
BILIRUB SERPL-MCNC: 0.8 MG/DL (ref 0–1.2)
BUN SERPL-MCNC: 37 MG/DL (ref 6–23)
CALCIUM SERPL-MCNC: 9.3 MG/DL (ref 8.6–10.3)
CARDIAC TROPONIN I PNL SERPL HS: 125 NG/L (ref 0–13)
CARDIAC TROPONIN I PNL SERPL HS: 95 NG/L (ref 0–13)
CHLORIDE SERPL-SCNC: 103 MMOL/L (ref 98–107)
CO2 SERPL-SCNC: 28 MMOL/L (ref 21–32)
CREAT SERPL-MCNC: 1.36 MG/DL (ref 0.5–1.05)
EGFRCR SERPLBLD CKD-EPI 2021: 36 ML/MIN/1.73M*2
EOSINOPHIL # BLD AUTO: 0.11 X10*3/UL (ref 0–0.4)
EOSINOPHIL NFR BLD AUTO: 1 %
ERYTHROCYTE [DISTWIDTH] IN BLOOD BY AUTOMATED COUNT: 13.2 % (ref 11.5–14.5)
ETHANOL SERPL-MCNC: <10 MG/DL
GLUCOSE SERPL-MCNC: 113 MG/DL (ref 74–99)
HCT VFR BLD AUTO: 43.4 % (ref 36–46)
HGB BLD-MCNC: 14.6 G/DL (ref 12–16)
HOLD SPECIMEN: NORMAL
IMM GRANULOCYTES # BLD AUTO: 0.03 X10*3/UL (ref 0–0.5)
IMM GRANULOCYTES NFR BLD AUTO: 0.3 % (ref 0–0.9)
INR PPP: 1 (ref 0.9–1.1)
LACTATE SERPL-SCNC: 1.5 MMOL/L (ref 0.4–2)
LYMPHOCYTES # BLD AUTO: 1.08 X10*3/UL (ref 0.8–3)
LYMPHOCYTES NFR BLD AUTO: 10.2 %
MCH RBC QN AUTO: 33.3 PG (ref 26–34)
MCHC RBC AUTO-ENTMCNC: 33.6 G/DL (ref 32–36)
MCV RBC AUTO: 99 FL (ref 80–100)
MONOCYTES # BLD AUTO: 0.94 X10*3/UL (ref 0.05–0.8)
MONOCYTES NFR BLD AUTO: 8.9 %
NEUTROPHILS # BLD AUTO: 8.4 X10*3/UL (ref 1.6–5.5)
NEUTROPHILS NFR BLD AUTO: 79.3 %
NRBC BLD-RTO: 0 /100 WBCS (ref 0–0)
PLATELET # BLD AUTO: 172 X10*3/UL (ref 150–450)
POTASSIUM SERPL-SCNC: 3.4 MMOL/L (ref 3.5–5.3)
PROT SERPL-MCNC: 7.2 G/DL (ref 6.4–8.2)
PROTHROMBIN TIME: 11.8 SECONDS (ref 9.8–12.8)
RBC # BLD AUTO: 4.38 X10*6/UL (ref 4–5.2)
SODIUM SERPL-SCNC: 140 MMOL/L (ref 136–145)
WBC # BLD AUTO: 10.6 X10*3/UL (ref 4.4–11.3)

## 2024-10-20 PROCEDURE — 84484 ASSAY OF TROPONIN QUANT: CPT | Performed by: PHYSICIAN ASSISTANT

## 2024-10-20 PROCEDURE — 80053 COMPREHEN METABOLIC PANEL: CPT | Performed by: PHYSICIAN ASSISTANT

## 2024-10-20 PROCEDURE — 85610 PROTHROMBIN TIME: CPT | Performed by: PHYSICIAN ASSISTANT

## 2024-10-20 PROCEDURE — 72125 CT NECK SPINE W/O DYE: CPT | Performed by: STUDENT IN AN ORGANIZED HEALTH CARE EDUCATION/TRAINING PROGRAM

## 2024-10-20 PROCEDURE — G0378 HOSPITAL OBSERVATION PER HR: HCPCS

## 2024-10-20 PROCEDURE — 99285 EMERGENCY DEPT VISIT HI MDM: CPT | Mod: 25

## 2024-10-20 PROCEDURE — 2500000001 HC RX 250 WO HCPCS SELF ADMINISTERED DRUGS (ALT 637 FOR MEDICARE OP): Performed by: PHYSICIAN ASSISTANT

## 2024-10-20 PROCEDURE — G0390 TRAUMA RESPONS W/HOSP CRITI: HCPCS

## 2024-10-20 PROCEDURE — 72128 CT CHEST SPINE W/O DYE: CPT | Performed by: STUDENT IN AN ORGANIZED HEALTH CARE EDUCATION/TRAINING PROGRAM

## 2024-10-20 PROCEDURE — 85025 COMPLETE CBC W/AUTO DIFF WBC: CPT | Performed by: PHYSICIAN ASSISTANT

## 2024-10-20 PROCEDURE — 72131 CT LUMBAR SPINE W/O DYE: CPT

## 2024-10-20 PROCEDURE — 2500000002 HC RX 250 W HCPCS SELF ADMINISTERED DRUGS (ALT 637 FOR MEDICARE OP, ALT 636 FOR OP/ED): Mod: MUE | Performed by: PHYSICIAN ASSISTANT

## 2024-10-20 PROCEDURE — 72131 CT LUMBAR SPINE W/O DYE: CPT | Performed by: STUDENT IN AN ORGANIZED HEALTH CARE EDUCATION/TRAINING PROGRAM

## 2024-10-20 PROCEDURE — 36415 COLL VENOUS BLD VENIPUNCTURE: CPT | Performed by: PHYSICIAN ASSISTANT

## 2024-10-20 PROCEDURE — 82077 ASSAY SPEC XCP UR&BREATH IA: CPT | Performed by: PHYSICIAN ASSISTANT

## 2024-10-20 PROCEDURE — 72125 CT NECK SPINE W/O DYE: CPT

## 2024-10-20 PROCEDURE — 72128 CT CHEST SPINE W/O DYE: CPT

## 2024-10-20 PROCEDURE — 70450 CT HEAD/BRAIN W/O DYE: CPT

## 2024-10-20 PROCEDURE — 83605 ASSAY OF LACTIC ACID: CPT | Performed by: PHYSICIAN ASSISTANT

## 2024-10-20 PROCEDURE — 70450 CT HEAD/BRAIN W/O DYE: CPT | Performed by: STUDENT IN AN ORGANIZED HEALTH CARE EDUCATION/TRAINING PROGRAM

## 2024-10-20 RX ORDER — AMOXICILLIN 250 MG
1 CAPSULE ORAL 2 TIMES DAILY
Status: CANCELLED | OUTPATIENT
Start: 2024-10-20

## 2024-10-20 RX ORDER — ACETAMINOPHEN 500 MG
5 TABLET ORAL NIGHTLY PRN
Status: CANCELLED | OUTPATIENT
Start: 2024-10-20

## 2024-10-20 RX ORDER — ONDANSETRON HYDROCHLORIDE 2 MG/ML
4 INJECTION, SOLUTION INTRAVENOUS EVERY 6 HOURS PRN
Status: CANCELLED | OUTPATIENT
Start: 2024-10-20

## 2024-10-20 RX ORDER — NAPROXEN SODIUM 220 MG/1
324 TABLET, FILM COATED ORAL ONCE
Status: COMPLETED | OUTPATIENT
Start: 2024-10-20 | End: 2024-10-20

## 2024-10-20 RX ORDER — POLYETHYLENE GLYCOL 3350 17 G/17G
17 POWDER, FOR SOLUTION ORAL DAILY
Status: CANCELLED | OUTPATIENT
Start: 2024-10-20

## 2024-10-20 RX ORDER — ACETAMINOPHEN 325 MG/1
650 TABLET ORAL EVERY 4 HOURS PRN
Status: CANCELLED | OUTPATIENT
Start: 2024-10-20

## 2024-10-20 RX ORDER — POTASSIUM CHLORIDE 20 MEQ/1
40 TABLET, EXTENDED RELEASE ORAL ONCE
Status: COMPLETED | OUTPATIENT
Start: 2024-10-20 | End: 2024-10-20

## 2024-10-20 RX ADMIN — POTASSIUM CHLORIDE 40 MEQ: 1500 TABLET, EXTENDED RELEASE ORAL at 06:41

## 2024-10-20 RX ADMIN — ASPIRIN 324 MG: 81 TABLET, CHEWABLE ORAL at 06:41

## 2024-10-20 ASSESSMENT — PAIN SCALES - GENERAL
PAINLEVEL_OUTOF10: 5 - MODERATE PAIN
PAINLEVEL_OUTOF10: 5 - MODERATE PAIN

## 2024-10-20 ASSESSMENT — PAIN DESCRIPTION - PROGRESSION: CLINICAL_PROGRESSION: NOT CHANGED

## 2024-10-20 ASSESSMENT — LIFESTYLE VARIABLES
HAVE YOU EVER FELT YOU SHOULD CUT DOWN ON YOUR DRINKING: NO
HAVE PEOPLE ANNOYED YOU BY CRITICIZING YOUR DRINKING: NO
TOTAL SCORE: 0
EVER FELT BAD OR GUILTY ABOUT YOUR DRINKING: NO
EVER HAD A DRINK FIRST THING IN THE MORNING TO STEADY YOUR NERVES TO GET RID OF A HANGOVER: NO

## 2024-10-20 ASSESSMENT — PAIN - FUNCTIONAL ASSESSMENT: PAIN_FUNCTIONAL_ASSESSMENT: 0-10

## 2024-10-20 ASSESSMENT — COLUMBIA-SUICIDE SEVERITY RATING SCALE - C-SSRS
6. HAVE YOU EVER DONE ANYTHING, STARTED TO DO ANYTHING, OR PREPARED TO DO ANYTHING TO END YOUR LIFE?: NO
2. HAVE YOU ACTUALLY HAD ANY THOUGHTS OF KILLING YOURSELF?: NO
1. IN THE PAST MONTH, HAVE YOU WISHED YOU WERE DEAD OR WISHED YOU COULD GO TO SLEEP AND NOT WAKE UP?: NO

## 2024-10-20 ASSESSMENT — PAIN DESCRIPTION - PAIN TYPE: TYPE: ACUTE PAIN

## 2024-10-20 ASSESSMENT — PAIN DESCRIPTION - LOCATION: LOCATION: NECK

## 2024-10-20 NOTE — PROGRESS NOTES
Emergency Medicine Transition of Care Note.    I received Mabel Stern in signout from JAZMÍN Reeder.  Please see the previous ED provider note for all HPI, PE and MDM up to the time of signout at 0 600. This is in addition to the primary record.    In brief Mabel Stern is an 89 y.o. female presenting for   Chief Complaint   Patient presents with    Fall     HIA NO LOC     At the time of signout we were awaiting:   Delta troponin and reevaluation  Diagnoses as of 10/20/24 0816   Fall, initial encounter   Elevated troponin       Medical Decision Making  89-year-old female presented to the emergency department after rolling out of bed.    HIA was called, patient was sent to CT scan, had head CT, C-spine CT, complaining of some back pain also lumbar spine and thoracic spine CT scans.  All negative for evidence of acute trauma.  CT imaging does show large volume impacted in the rectum with mesorectal stranding.    Laboratory workup was obtained as well, patient's initial troponin elevated to 125.  CBC and metabolic panels relatively unremarkable, other than mild hyponatremia that was repleted orally.  Patient was also given aspirin for the elevated troponin.  Delta troponin downtrending to 95.    On reevaluation patient states she still doing back pain, still states that she does not feel well overall.  She is concerned she is urinary tract infection she has had quite a bit of urinary discomfort.  States she is not sure if she will be able to stand, still having pain, although declines any pain medication.    Given her elevated troponin, fall, discussed hospitalization.  Will obtain a urine sample as well prior to admission.    EKG at 844 with ventricular of 88, as interpreted by me, shows atrial fibrillation rate controlled.  Marked ST abnormality noted but no evidence of acute ischemia.    Patient is a hospice patient, DNR CC, daughter phoned the hospital and requested that she be discharged back to her  facility.  This was confirmed with hospice in the facility, patient will be returned.        Final diagnoses:   None           Procedure  Procedures    Rosario Thurston, APRN-CNP

## 2024-10-20 NOTE — ED PROVIDER NOTES
HPI   Chief Complaint   Patient presents with    Fall     HIA NO LOC       89-year-old female patient is brought in emergency department today by EMS secondary to falling out of her bed.  Patient states she must been sleeping on the edge of her bed when she fell out of her bed.  She complains of low back pain but otherwise has no other complaints present time.  Patient does take blood thinner.  She denies any chest pain, shortness of breath, fevers, chills, nausea, vomiting, abdominal pain, hip pain, leg pain.  For this purpose she was brought to the emergency department today further evaluation.              Patient History   No past medical history on file.  No past surgical history on file.  No family history on file.  Social History     Tobacco Use    Smoking status: Not on file    Smokeless tobacco: Not on file   Substance Use Topics    Alcohol use: Not on file    Drug use: Not on file       Physical Exam   ED Triage Vitals   Temp Pulse Resp BP   -- -- -- --      SpO2 Temp src Heart Rate Source Patient Position   -- -- -- --      BP Location FiO2 (%)     -- --       Physical Exam  Constitutional:       General: She is not in acute distress.     Appearance: Normal appearance. She is not ill-appearing or diaphoretic.   HENT:      Head: Normocephalic and atraumatic.      Nose: Nose normal.   Eyes:      Extraocular Movements: Extraocular movements intact.      Conjunctiva/sclera: Conjunctivae normal.      Pupils: Pupils are equal, round, and reactive to light.   Cardiovascular:      Rate and Rhythm: Normal rate and regular rhythm.   Pulmonary:      Effort: Pulmonary effort is normal. No respiratory distress.      Breath sounds: Normal breath sounds. No stridor. No wheezing.   Abdominal:      General: Abdomen is flat.      Palpations: Abdomen is soft.      Tenderness: There is no abdominal tenderness. There is no guarding or rebound.   Musculoskeletal:         General: Tenderness (Midline distal C-spine, L-spine, no  pain over the pelvis, hips, lower extremities, upper extremities, clavicles, ribs) present. Normal range of motion.      Cervical back: Normal range of motion.   Skin:     General: Skin is warm and dry.   Neurological:      General: No focal deficit present.      Mental Status: She is alert and oriented to person, place, and time. Mental status is at baseline.   Psychiatric:         Mood and Affect: Mood normal.           ED Course & MDM                  No data recorded                                 Medical Decision Making  89-year-old female patient is brought in emergency department today by EMS secondary to falling out of her bed.  Patient states she must been sleeping on the edge of her bed when she fell out of her bed.  She complains of low back pain but otherwise has no other complaints present time.  Patient does take blood thinner.  She denies any chest pain, shortness of breath, fevers, chills, nausea, vomiting, abdominal pain, hip pain, leg pain.  For this purpose she was brought to the emergency department today further evaluation.    Basic laboratory studies ordered as well as CT head, spine.  Rule out any acute intracranial bleed, brain edema, calvarial fracture, spinal injury or fracture.  Rule out leukocytosis, left shift, acute kidney injury.    Patient has negative acute findings of the CT of the head and C-spine.  Patient's troponin came back elevated at 125 aspirin ordered, potassium 3.4 p.o. potassium ordered creatinine 1.36 GFR 36, lactate negative, white blood cell count negative.  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    Handoff to Rosario Thurston pending laboratory studies, radiology studies, reevaluation disposition      Labs Reviewed   CBC WITH AUTO DIFFERENTIAL - Abnormal       Result Value    WBC 10.6      nRBC 0.0      RBC 4.38      Hemoglobin 14.6      Hematocrit 43.4      MCV 99      MCH 33.3      MCHC 33.6      RDW 13.2       Platelets 172      Neutrophils % 79.3      Immature Granulocytes %, Automated 0.3      Lymphocytes % 10.2      Monocytes % 8.9      Eosinophils % 1.0      Basophils % 0.3      Neutrophils Absolute 8.40 (*)     Immature Granulocytes Absolute, Automated 0.03      Lymphocytes Absolute 1.08      Monocytes Absolute 0.94 (*)     Eosinophils Absolute 0.11      Basophils Absolute 0.03     COMPREHENSIVE METABOLIC PANEL - Abnormal    Glucose 113 (*)     Sodium 140      Potassium 3.4 (*)     Chloride 103      Bicarbonate 28      Anion Gap 12      Urea Nitrogen 37 (*)     Creatinine 1.36 (*)     eGFR 36 (*)     Calcium 9.3      Albumin 4.0      Alkaline Phosphatase 65      Total Protein 7.2      AST 22      Bilirubin, Total 0.8      ALT 21     TROPONIN I, HIGH SENSITIVITY - Abnormal    Troponin I, High Sensitivity 125 (*)     Narrative:     Less than 99th percentile of normal range cutoff-  Female and children under 18 years old <14 ng/L; Male <21 ng/L: Negative  Repeat testing should be performed if clinically indicated.     Female and children under 18 years old 14-50 ng/L; Male 21-50 ng/L:  Consistent with possible cardiac damage and possible increased clinical   risk. Serial measurements may help to assess extent of myocardial damage.     >50 ng/L: Consistent with cardiac damage, increased clinical risk and  myocardial infarction. Serial measurements may help assess extent of   myocardial damage.      NOTE: Children less than 1 year old may have higher baseline troponin   levels and results should be interpreted in conjunction with the overall   clinical context.     NOTE: Troponin I testing is performed using a different   testing methodology at [facility] than at other   [facility]. Direct result comparisons should only   be made within the same method.   ALCOHOL - Normal    Alcohol <10     LACTATE - Normal    Lactate 1.5      Narrative:     Venipuncture immediately after or during the  administration of Metamizole may lead to falsely low results. Testing should be performed immediately prior to Metamizole dosing.   PROTIME-INR - Normal    Protime 11.8      INR 1.0     TROPONIN I, HIGH SENSITIVITY        CT head W O contrast trauma protocol   Final Result   No acute intracranial abnormality.        Subcortical and periventricular white matter hypoattenuation likely   represent sequelae of chronic ischemic microangiopathy, similar to   prior.        There is a right parotid gland mass measuring 3.1 cm which is stable   compared to 07/10/2023. This can be further evaluated with ultrasound   or tissue sampling in the outpatient setting if concordant with   patient's goals of care.        MACRO:   Critical Finding:  See findings. Notification was initiated on   10/20/2024 at 5:20 am by  Zafar Mak.  (**-YCF-**)        Signed by: Zafar Mak 10/20/2024 5:21 AM   Dictation workstation:   [id number]      CT cervical spine wo IV contrast   Final Result   No acute fracture or traumatic subluxation of the cervical spine.        Diffuse osseous demineralization. Additional chronic findings as   described in the body of the report.        MACRO:   None        Signed by: Zafar Mak 10/20/2024 5:37 AM   Dictation workstation:   [id number]      CT thoracic spine wo IV contrast    (Results Pending)   CT lumbar spine wo IV contrast    (Results Pending)         Procedure  Procedures     Elias Lehman PA-C  10/20/24 0611

## 2024-10-21 ENCOUNTER — HOSPITAL ENCOUNTER (OUTPATIENT)
Dept: CARDIOLOGY | Facility: HOSPITAL | Age: 89
Discharge: HOME | End: 2024-10-21
Payer: MEDICARE

## 2024-10-21 PROCEDURE — 93005 ELECTROCARDIOGRAM TRACING: CPT

## 2024-10-22 LAB
ATRIAL RATE: 208 BPM
Q ONSET: 211 MS
QRS COUNT: 14 BEATS
QRS DURATION: 92 MS
QT INTERVAL: 384 MS
QTC CALCULATION(BAZETT): 464 MS
QTC FREDERICIA: 436 MS
R AXIS: -11 DEGREES
T AXIS: 144 DEGREES
T OFFSET: 403 MS
VENTRICULAR RATE: 88 BPM

## 2024-10-28 DIAGNOSIS — I50.9 CONGESTIVE HEART FAILURE, UNSPECIFIED HF CHRONICITY, UNSPECIFIED HEART FAILURE TYPE (HCC): Primary | ICD-10-CM

## 2024-10-28 RX ORDER — FUROSEMIDE 20 MG/1
TABLET ORAL
Qty: 30 TABLET | Refills: 5 | Status: SHIPPED | OUTPATIENT
Start: 2024-10-28

## 2024-11-05 ENCOUNTER — OFFICE VISIT (OUTPATIENT)
Dept: GERIATRIC MEDICINE | Age: 88
End: 2024-11-05
Payer: MEDICARE

## 2024-11-05 DIAGNOSIS — N39.0 RECURRENT UTI: Primary | ICD-10-CM

## 2024-11-05 DIAGNOSIS — F03.90 SENILE DEMENTIA (HCC): ICD-10-CM

## 2024-11-05 DIAGNOSIS — Z51.5 HOSPICE CARE PATIENT: ICD-10-CM

## 2024-11-05 DIAGNOSIS — R45.1 ANXIETY WITH AGITATION: ICD-10-CM

## 2024-11-05 DIAGNOSIS — F41.9 ANXIETY WITH AGITATION: ICD-10-CM

## 2024-11-05 PROCEDURE — G8417 CALC BMI ABV UP PARAM F/U: HCPCS | Performed by: PHYSICIAN ASSISTANT

## 2024-11-05 PROCEDURE — 1036F TOBACCO NON-USER: CPT | Performed by: PHYSICIAN ASSISTANT

## 2024-11-05 PROCEDURE — G8484 FLU IMMUNIZE NO ADMIN: HCPCS | Performed by: PHYSICIAN ASSISTANT

## 2024-11-05 PROCEDURE — 99348 HOME/RES VST EST LOW MDM 30: CPT | Performed by: PHYSICIAN ASSISTANT

## 2024-11-05 PROCEDURE — 1123F ACP DISCUSS/DSCN MKR DOCD: CPT | Performed by: PHYSICIAN ASSISTANT

## 2024-11-11 ASSESSMENT — ENCOUNTER SYMPTOMS
ABDOMINAL DISTENTION: 0
ABDOMINAL PAIN: 0
DIARRHEA: 0
SHORTNESS OF BREATH: 1
NAUSEA: 0

## 2024-11-11 NOTE — PROGRESS NOTES
Subjective:      Patient ID: Frances Meek is a pleasant 89 y.o. female who presents today for:  Chief Complaint   Patient presents with    Other     Hospice care patient  (primary encounter diagnosis)  Acute cystitis without hematuria         [facility]  Patient seen today for new onset UTI.  Patient just recently began hospice care.  Was having acute change in mentation, UA showed positive findings indicative of acute infection.  Patient was seen today she was fairly lethargic compared to baseline, did write for doxycycline coverage over the next several days and monitoring for complications UTI.      Patient Active Problem List   Diagnosis    Choroidal nevus of right eye    CKD (chronic kidney disease) stage 3, GFR 30-59 ml/min (HCC)    Diverticulosis    Esophageal reflux    Essential hypertension    AMANDA (generalized anxiety disorder)    HLD (hyperlipidemia)    Mild episode of recurrent major depressive disorder (HCC)    Nonintractable headache    Obesity, Class I, BMI 30-34.9    Primary osteoarthritis of right knee    Paroxysmal atrial fibrillation (HCC)    Psychophysiological insomnia    Vitamin D deficiency    Chronic combined systolic and diastolic heart failure (HCC)    Hypertensive heart disease with heart failure (HCC)    Long term (current) use of anticoagulants    Thrombocytopenia, unspecified (HCC)    Acute diastolic (congestive) heart failure (HCC)    Valvular heart disease    CHF (congestive heart failure), NYHA class I, acute on chronic, combined (HCC)    Aortic stenosis, moderate    Class 1 obesity due to excess calories with serious comorbidity and body mass index (BMI) of 31.0 to 31.9 in adult    Community acquired bacterial pneumonia    Hypothyroidism    Other iron deficiency anemias    Pulmonary hypertension (HCC)    Impaired mobility and activities of daily living    Acute on chronic congestive heart failure (HCC)    Impaired mobility and ADLs dt CP debility    Hypoxia

## 2024-12-04 NOTE — PROGRESS NOTES
Subjective:      Patient ID: Frances Meek is a pleasant 89 y.o. female who presents today for:  Chief Complaint   Patient presents with    Other     Recurrent uti  (primary encounter diagnosis)  Senile dementia (hcc)  Anxiety with agitation       [facility]    Hospice patient today seen for increasing Yaz as well as change in mentation, dysuria.  Did find a right orders for ciprofloxacin due to recent positive UTI.  Did recall patient's L allergy to Cipro, will change to Keflex orders.  Hospice updated.  Patient showing increased agitation overall, would likely benefit from Change in her Klonopin order.  Will change to 1 mg p.o. 4 times daily routine for anxiety/agitation.  Patient has adequate supply at this time, will have hospice alerted and change order set and record.  Patient has been refusing showering and general hygiene care from hospice members when they come to assess.  Continue to document refusals.  No further acute concerns at this time.        Patient Active Problem List   Diagnosis    Choroidal nevus of right eye    CKD (chronic kidney disease) stage 3, GFR 30-59 ml/min (HCC)    Diverticulosis    Esophageal reflux    Essential hypertension    AMANDA (generalized anxiety disorder)    HLD (hyperlipidemia)    Mild episode of recurrent major depressive disorder (HCC)    Nonintractable headache    Obesity, Class I, BMI 30-34.9    Primary osteoarthritis of right knee    Paroxysmal atrial fibrillation (HCC)    Psychophysiological insomnia    Vitamin D deficiency    Chronic combined systolic and diastolic heart failure (HCC)    Hypertensive heart disease with heart failure (HCC)    Long term (current) use of anticoagulants    Thrombocytopenia, unspecified (HCC)    Acute diastolic (congestive) heart failure (HCC)    Valvular heart disease    CHF (congestive heart failure), NYHA class I, acute on chronic, combined (HCC)    Aortic stenosis, moderate    Class 1 obesity due to excess